# Patient Record
Sex: MALE | Race: BLACK OR AFRICAN AMERICAN | Employment: UNEMPLOYED | URBAN - METROPOLITAN AREA
[De-identification: names, ages, dates, MRNs, and addresses within clinical notes are randomized per-mention and may not be internally consistent; named-entity substitution may affect disease eponyms.]

---

## 2017-01-11 DIAGNOSIS — Z76.0 MEDICATION REFILL: ICD-10-CM

## 2017-01-11 RX ORDER — OXYCODONE AND ACETAMINOPHEN 7.5; 325 MG/1; MG/1
TABLET ORAL
Qty: 30 TAB | Refills: 0 | Status: CANCELLED | OUTPATIENT
Start: 2017-01-11

## 2017-01-11 NOTE — TELEPHONE ENCOUNTER
Patient request, has an appt 1/23/17    Requested Prescriptions     Pending Prescriptions Disp Refills    oxyCODONE-acetaminophen (PERCOCET) 7.5-325 mg per tablet 30 Tab 0     Sig: Take one po daily prn pain

## 2017-01-23 ENCOUNTER — OFFICE VISIT (OUTPATIENT)
Dept: INTERNAL MEDICINE CLINIC | Age: 57
End: 2017-01-23

## 2017-01-23 ENCOUNTER — HOSPITAL ENCOUNTER (OUTPATIENT)
Dept: LAB | Age: 57
Discharge: HOME | End: 2017-01-23
Payer: MEDICARE

## 2017-01-23 VITALS
BODY MASS INDEX: 36.1 KG/M2 | WEIGHT: 230 LBS | RESPIRATION RATE: 15 BRPM | OXYGEN SATURATION: 97 % | DIASTOLIC BLOOD PRESSURE: 88 MMHG | SYSTOLIC BLOOD PRESSURE: 150 MMHG | HEIGHT: 67 IN | TEMPERATURE: 97.5 F | HEART RATE: 61 BPM

## 2017-01-23 DIAGNOSIS — I10 BENIGN HYPERTENSION WITHOUT CHF: ICD-10-CM

## 2017-01-23 DIAGNOSIS — E11.9 DIABETES MELLITUS, STABLE (HCC): ICD-10-CM

## 2017-01-23 DIAGNOSIS — E11.9 DIABETES MELLITUS, STABLE (HCC): Primary | ICD-10-CM

## 2017-01-23 DIAGNOSIS — Z76.0 MEDICATION REFILL: ICD-10-CM

## 2017-01-23 DIAGNOSIS — E78.5 DYSLIPIDEMIA: ICD-10-CM

## 2017-01-23 DIAGNOSIS — Z12.5 SCREENING PSA (PROSTATE SPECIFIC ANTIGEN): ICD-10-CM

## 2017-01-23 DIAGNOSIS — Z23 ENCOUNTER FOR IMMUNIZATION: ICD-10-CM

## 2017-01-23 LAB
ALBUMIN SERPL BCP-MCNC: 4 G/DL (ref 3.4–5)
ALBUMIN/GLOB SERPL: 1.3 {RATIO} (ref 0.8–1.7)
ALP SERPL-CCNC: 95 U/L (ref 45–117)
ALT SERPL-CCNC: 47 U/L (ref 16–61)
ANION GAP BLD CALC-SCNC: 9 MMOL/L (ref 3–18)
APPEARANCE UR: CLEAR
AST SERPL W P-5'-P-CCNC: 26 U/L (ref 15–37)
BILIRUB SERPL-MCNC: 0.4 MG/DL (ref 0.2–1)
BILIRUB UR QL: NEGATIVE
BUN SERPL-MCNC: 18 MG/DL (ref 7–18)
BUN/CREAT SERPL: 16 (ref 12–20)
CALCIUM SERPL-MCNC: 9.2 MG/DL (ref 8.5–10.1)
CHLORIDE SERPL-SCNC: 100 MMOL/L (ref 100–108)
CHOLEST SERPL-MCNC: 132 MG/DL
CO2 SERPL-SCNC: 29 MMOL/L (ref 21–32)
COLOR UR: YELLOW
CREAT SERPL-MCNC: 1.14 MG/DL (ref 0.6–1.3)
CREAT UR-MCNC: 172.98 MG/DL (ref 30–125)
ERYTHROCYTE [DISTWIDTH] IN BLOOD BY AUTOMATED COUNT: 13.7 % (ref 11.6–14.5)
EST. AVERAGE GLUCOSE BLD GHB EST-MCNC: 151 MG/DL
GLOBULIN SER CALC-MCNC: 3 G/DL (ref 2–4)
GLUCOSE SERPL-MCNC: 107 MG/DL (ref 74–99)
GLUCOSE UR STRIP.AUTO-MCNC: NEGATIVE MG/DL
HBA1C MFR BLD: 6.9 % (ref 4.2–5.6)
HCT VFR BLD AUTO: 48.8 % (ref 36–48)
HDLC SERPL-MCNC: 45 MG/DL (ref 40–60)
HDLC SERPL: 2.9 {RATIO} (ref 0–5)
HGB BLD-MCNC: 15.8 G/DL (ref 13–16)
HGB UR QL STRIP: NEGATIVE
KETONES UR QL STRIP.AUTO: NEGATIVE MG/DL
LDLC SERPL CALC-MCNC: 75.8 MG/DL (ref 0–100)
LEUKOCYTE ESTERASE UR QL STRIP.AUTO: NEGATIVE
LIPID PROFILE,FLP: NORMAL
MCH RBC QN AUTO: 26.2 PG (ref 24–34)
MCHC RBC AUTO-ENTMCNC: 32.4 G/DL (ref 31–37)
MCV RBC AUTO: 81.1 FL (ref 74–97)
MICROALBUMIN UR-MCNC: 0.9 MG/DL (ref 0–3)
MICROALBUMIN/CREAT UR-RTO: 5 MG/G (ref 0–30)
NITRITE UR QL STRIP.AUTO: NEGATIVE
PH UR STRIP: 6 [PH] (ref 5–8)
PLATELET # BLD AUTO: 195 K/UL (ref 135–420)
PMV BLD AUTO: 10.9 FL (ref 9.2–11.8)
POTASSIUM SERPL-SCNC: 4.2 MMOL/L (ref 3.5–5.5)
PROT SERPL-MCNC: 7 G/DL (ref 6.4–8.2)
PROT UR STRIP-MCNC: NEGATIVE MG/DL
PSA SERPL-MCNC: 0.8 NG/ML (ref 0–4)
RBC # BLD AUTO: 6.02 M/UL (ref 4.7–5.5)
SODIUM SERPL-SCNC: 138 MMOL/L (ref 136–145)
SP GR UR REFRACTOMETRY: 1.02 (ref 1–1.03)
TRIGL SERPL-MCNC: 56 MG/DL (ref ?–150)
TSH SERPL DL<=0.05 MIU/L-ACNC: 0.82 UIU/ML (ref 0.36–3.74)
UROBILINOGEN UR QL STRIP.AUTO: 0.2 EU/DL (ref 0.2–1)
VLDLC SERPL CALC-MCNC: 11.2 MG/DL
WBC # BLD AUTO: 5 K/UL (ref 4.6–13.2)

## 2017-01-23 PROCEDURE — 36415 COLL VENOUS BLD VENIPUNCTURE: CPT | Performed by: INTERNAL MEDICINE

## 2017-01-23 PROCEDURE — 84153 ASSAY OF PSA TOTAL: CPT | Performed by: INTERNAL MEDICINE

## 2017-01-23 PROCEDURE — 82043 UR ALBUMIN QUANTITATIVE: CPT | Performed by: INTERNAL MEDICINE

## 2017-01-23 PROCEDURE — 80061 LIPID PANEL: CPT | Performed by: INTERNAL MEDICINE

## 2017-01-23 PROCEDURE — 81003 URINALYSIS AUTO W/O SCOPE: CPT | Performed by: INTERNAL MEDICINE

## 2017-01-23 PROCEDURE — 85027 COMPLETE CBC AUTOMATED: CPT | Performed by: INTERNAL MEDICINE

## 2017-01-23 PROCEDURE — 80053 COMPREHEN METABOLIC PANEL: CPT | Performed by: INTERNAL MEDICINE

## 2017-01-23 PROCEDURE — 83036 HEMOGLOBIN GLYCOSYLATED A1C: CPT | Performed by: INTERNAL MEDICINE

## 2017-01-23 PROCEDURE — 84443 ASSAY THYROID STIM HORMONE: CPT | Performed by: INTERNAL MEDICINE

## 2017-01-23 RX ORDER — NAPROXEN 500 MG/1
500 TABLET ORAL
COMMUNITY
End: 2017-04-24

## 2017-01-23 RX ORDER — OXYCODONE AND ACETAMINOPHEN 7.5; 325 MG/1; MG/1
TABLET ORAL
Qty: 30 TAB | Refills: 0 | Status: SHIPPED | OUTPATIENT
Start: 2017-01-23 | End: 2017-03-08 | Stop reason: SDUPTHER

## 2017-01-23 RX ORDER — CYCLOBENZAPRINE HCL 10 MG
10 TABLET ORAL
COMMUNITY
End: 2017-04-24

## 2017-01-23 NOTE — MR AVS SNAPSHOT
Visit Information Date & Time Provider Department Dept. Phone Encounter #  
 1/23/2017  9:45 AM Matthew Odell MD [address] [phone] [id number] Follow-up Instructions Return in about 3 months (around 4/23/2017) for medicare wellness subsequent exam, f/u DM/HTN. Upcoming Health Maintenance Date Due Pneumococcal 19-64 Highest Risk (1 of 3 - PCV13) 12/17/1978 DTaP/Tdap/Td series (1 - Tdap) 12/17/1980 FOOT EXAM Q1 11/23/2016 HEMOGLOBIN A1C Q6M 1/12/2017 MICROALBUMIN Q1 3/23/2017 LIPID PANEL Q1 7/12/2017 EYE EXAM RETINAL OR DILATED Q1 9/9/2017 COLONOSCOPY 12/2/2018 Allergies as of 1/23/2017  Review Complete On: 1/23/2017 By: Rex Coley MD  
 No Known Allergies Current Immunizations  Reviewed on 3/23/2016 Name Date Influenza Vaccine 9/9/2014  9:28 AM, 2/6/2014  1:10 AM  
 Influenza Vaccine (Quad) PF  Incomplete, 11/23/2015 Not reviewed this visit You Were Diagnosed With   
  
 Codes Comments Diabetes mellitus, stable [facility]    -  Primary ICD-10-CM: E11.9 ICD-9-CM: 250.00 Benign hypertension without CHF     ICD-10-CM: I10 
ICD-9-CM: 401.1 Dyslipidemia     ICD-10-CM: E78.5 ICD-9-CM: 272.4 Encounter for immunization     ICD-10-CM: L33 ICD-9-CM: V03.89 Screening PSA (prostate specific antigen)     ICD-10-CM: Z12.5 ICD-9-CM: V76.44 Medication refill     ICD-10-CM: Z76.0 ICD-9-CM: V68.1 Vitals BP Pulse Temp Resp Height(growth percentile) Weight(growth percentile) (!) 149/96 61 97.5 °F (36.4 °C) (Oral) 15 5' 7\" (1.702 m) 230 lb (104.3 kg) SpO2 BMI Smoking Status 97% 36.02 kg/m2 Never Smoker BMI and BSA Data Body Mass Index Body Surface Area 36.02 kg/m 2 2.22 m 2 Preferred Pharmacy Pharmacy Name Phone [facility] [address] [facility], [address] [place] [address] [phone] Your Updated Medication List  
  
   
This list is accurate as of: 1/23/17 10:17 AM.  Always use your most recent med list. amLODIPine-benazepril 5-20 mg per capsule Commonly known as:  LOTREL  
TAKE ONE CAPSULE BY MOUTH ONCE DAILY  
  
 aspirin, buffered 81 mg Tab Take 81 mg by mouth daily. atorvastatin 10 mg tablet Commonly known as:  LIPITOR  
TAKE 1 TABLET BY MOUTH DAILY  
  
 cyclobenzaprine 10 mg tablet Commonly known as:  FLEXERIL  
10 mg. FREESTYLE LITE METER monitoring kit Generic drug:  Blood-Glucose Meter Check fasting sugars daily before breakfast. BRAND MEDICALLY NECESSARY FREESTYLE LITE STRIPS strip Generic drug:  glucose blood VI test strips Check fasting sugars daily before breakfast. BRAND MEDICALLY NECESSARY. FOR FREESTYLE LITE METER  
  
 furosemide 20 mg tablet Commonly known as:  LASIX Take one po daily prn swelling  
  
 gabapentin 300 mg capsule Commonly known as:  NEURONTIN  
TAKE 2 CAPSULES BY MOUTH EVERY MORNING AND 2 CAPSULES EVERY EVENING Lancets Misc Check fasting sugars daily before breakfast. BRAND MEDICALLY NECESSARY. FOR FREESTYLE LITE METER  
  
 metoprolol tartrate 50 mg tablet Commonly known as:  LOPRESSOR  
TAKE ONE TABLET BY MOUTH TWICE DAILY  
  
 naproxen 500 mg tablet Commonly known as:  NAPROSYN  
500 mg.  
  
 oxyCODONE-acetaminophen 7.5-325 mg per tablet Commonly known as:  PERCOCET Take one po daily prn pain Prescriptions Printed Refills  
 oxyCODONE-acetaminophen (PERCOCET) 7.5-325 mg per tablet 0 Sig: Take one po daily prn pain  
 Class: Print We Performed the Following ADMIN INFLUENZA VIRUS VAC [ [facility]] INFLUENZA VIRUS VAC QUAD,SPLIT,PRESV FREE SYRINGE 3/> YRS IM [id number] CPT(R)] Follow-up Instructions Return in about 3 months (around 4/23/2017) for medicare wellness subsequent exam, f/u DM/HTN. To-Do List   
 01/23/2017 Lab:  CBC W/O DIFF   
  
 01/23/2017 Lab:  HEMOGLOBIN A1C WITH EAG   
  
 01/23/2017 Lab:  LIPID PANEL   
  
 01/23/2017 Lab:  METABOLIC PANEL, COMPREHENSIVE   
  
 01/23/2017 Lab:  MICROALBUMIN, UR, RAND W/ MICROALBUMIN/CREA RATIO   
  
 01/23/2017 Lab:  PSA SCREENING (SCREENING)   
  
 01/23/2017 Lab:  TSH 3RD GENERATION   
  
 01/23/2017 Lab:  URINALYSIS W/ RFLX MICROSCOPIC Patient Instructions 1) follow-up in 3months or sooner if worsening symptoms. Introducing hospitals & HEALTH SERVICES! Bernadette Mendosa introduces [employer] patient portal. Now you can access parts of your medical record, email your doctor's office, and request medication refills online. 1. In your internet browser, go to [email or web address] 2. Click on the First Time User? Click Here link in the Sign In box. You will see the New Member Sign Up page. 3. Enter your [employer] Access Code exactly as it appears below. You will not need to use this code after youve completed the sign-up process. If you do not sign up before the expiration date, you must request a new code. · [employer] Access Code: [email or web address] Expires: 2/1/2017  2:06 PM 
 
4. Enter the last four digits of your Social Security Number (xxxx) and Date of Birth (mm/dd/yyyy) as indicated and click Submit. You will be taken to the next sign-up page. 5. Create a [employer] ID. This will be your [employer] login ID and cannot be changed, so think of one that is secure and easy to remember. 6. Create a [employer] password. You can change your password at any time. 7. Enter your Password Reset Question and Answer. This can be used at a later time if you forget your password. 8. Enter your e-mail address. You will receive e-mail notification when new information is available in [address] 9. Click Sign Up. You can now view and download portions of your medical record. 10. Click the Download Summary menu link to download a portable copy of your medical information. If you have questions, please visit the Frequently Asked Questions section of the [email or web address] website. Remember, [email or web address] is NOT to be used for urgent needs. For medical emergencies, dial 911. Now available from your iPhone and Android! Please provide this summary of care documentation to your next provider. Your primary care clinician is listed as Juan Rodríguez. If you have any questions after today's visit, please call [phone].

## 2017-01-23 NOTE — PROGRESS NOTES
Chief Complaint   Patient presents with    Diabetes    Hypertension    Back Pain       HPI:     Dacia Hernandez is a 61 y.o.  male with history of type 2 DM and hypertension  here for the above complaint. He had to go to orthopedics for his right knee pain. He denies any chest pain, shortness of breath, abdominal pain, headaches or dizziness. Type 2 DM: 106 this AM. Sugar range: 100-110.             Past Medical History   Diagnosis Date    Anxiety     Back injury 4/27/2007    Behavioral change     Chronic pain     CKD (chronic kidney disease) stage 2, GFR 60-89 ml/min 12/20/2013    Confusion     DDD (degenerative disc disease)     Depression     Diabetic eye exam ([facility] 09/09/2016     Dr. Saige Murguia ( bilateral cataracts)    Difficulty walking     DJD (degenerative joint disease)     Fatigue     Generalized stiffness     H/O colonoscopy 12/2/2013     Bx: showed 2 tubular adenomas Done by Dr. Aneta Almendarez    High cholesterol     Hypertension     Incoordination     Infected sebaceous cyst 5/4/2016    Insomnia     Joint pain     Lower extremity edema     Lymphedema     Muscle pain     Neuropathy     Osteoarthritis     Other and unspecified hyperlipidemia     Painful sexual intercourse     Poor concentration     Snoring     Type II or unspecified type diabetes mellitus without mention of complication, not stated as uncontrolled     Weakness      Past Surgical History   Procedure Laterality Date    Hx colonoscopy      Hx orthopaedic       left elbow surgery    Hx orthopaedic Left 1/19/16     left Total Knee replacement     Current Outpatient Prescriptions   Medication Sig    oxyCODONE-acetaminophen (PERCOCET) 7.5-325 mg per tablet Take one po daily prn pain    atorvastatin (LIPITOR) 10 mg tablet TAKE 1 TABLET BY MOUTH DAILY    gabapentin (NEURONTIN) 300 mg capsule TAKE 2 CAPSULES BY MOUTH EVERY MORNING AND 2 CAPSULES EVERY EVENING    metoprolol tartrate (LOPRESSOR) 50 mg tablet TAKE ONE TABLET BY MOUTH TWICE DAILY    FREESTYLE LITE METER monitoring kit Check fasting sugars daily before breakfast. BRAND MEDICALLY NECESSARY    Lancets misc Check fasting sugars daily before breakfast. BRAND MEDICALLY NECESSARY. FOR FREESTYLE LITE METER    FREESTYLE LITE STRIPS strip Check fasting sugars daily before breakfast. BRAND MEDICALLY NECESSARY. FOR FREESTYLE LITE METER    Aspirin, Buffered 81 mg tab Take 81 mg by mouth daily.  amLODIPine-benazepril (LOTREL) 5-20 mg per capsule TAKE ONE CAPSULE BY MOUTH ONCE DAILY    furosemide (LASIX) 20 mg tablet Take one po daily prn swelling    cyclobenzaprine (FLEXERIL) 10 mg tablet 10 mg.    naproxen (NAPROSYN) 500 mg tablet 500 mg. No current facility-administered medications for this visit. Health Maintenance   Topic Date Due    Pneumococcal 19-64 Highest Risk (1 of 3 - PCV13) 12/17/1978    DTaP/Tdap/Td series (1 - Tdap) 12/17/1980    FOOT EXAM Q1  11/23/2016    HEMOGLOBIN A1C Q6M  01/12/2017    MICROALBUMIN Q1  03/23/2017    LIPID PANEL Q1  07/12/2017    EYE EXAM RETINAL OR DILATED Q1  09/09/2017    COLONOSCOPY  12/02/2018    Hepatitis C Screening  Completed    INFLUENZA AGE 9 TO ADULT  Completed     Immunization History   Administered Date(s) Administered    Influenza Vaccine 02/06/2014, 09/09/2014    Influenza Vaccine (Quad) PF 11/23/2015, 01/23/2017     No LMP for male patient. Allergies and Intolerances:   No Known Allergies    Family History:   Family History   Problem Relation Age of Onset   [place] Stroke Mother     Hypertension Mother     Seizures Mother     Diabetes Father     Heart Disease Father     Hypertension Father     Other Maternal Grandmother      tumor in abdomen       Social History:   He  reports that he has never smoked. He has never used smokeless tobacco.  He  reports that he does not drink alcohol.             Objective:   Physical exam:   Visit Vitals    /88 (BP 1 Location: Left arm, BP Patient Position: Sitting)  Comment: pt did not take his BP meds this AM    Pulse 61    Temp 97.5 °F (36.4 °C) (Oral)    Resp 15    Ht 5' 7\" (1.702 m)    Wt 230 lb (104.3 kg)    SpO2 97%    BMI 36.02 kg/m2        Generally: Pleasant male in no acute distress  Cardiac Exam: regular, rate, and rhythm. Normal S1 and S2. No murmurs, gallops, or rubs  Pulmonary exam: Clear to auscultation bilaterally  Abdominal exam: Positive bowel sounds in all four quadrants, soft, nondistended, nontender  Extremities: 2+ dorsalis pedis pulses bilaterally. No pedal edema    bilaterally    LABS/Radiological Tests:  Component      Latest Ref Rng & Units 7/12/2016 7/12/2016 7/12/2016           9:46 AM  9:46 AM  9:46 AM   Sodium      136 - 145 mmol/L   140   Potassium      3.5 - 5.5 mmol/L   3.8   Chloride      100 - 108 mmol/L   105   CO2      21 - 32 mmol/L   29   Anion gap      3.0 - 18 mmol/L   6   Glucose      74 - 99 mg/dL   114 (H)   BUN      7.0 - 18 MG/DL   21 (H)   Creatinine      0.6 - 1.3 MG/DL   1.24   BUN/Creatinine ratio      12 - 20     17   GFR est AA      >60 ml/min/1.73m2   >60   GFR est non-AA      >60 ml/min/1.73m2   >60   Calcium      8.5 - 10.1 MG/DL   8.7   Bilirubin, total      0.2 - 1.0 MG/DL   0.6   ALT      16 - 61 U/L   51   AST      15 - 37 U/L   26   Alk. phosphatase      45 - 117 U/L   106   Protein, total      6.4 - 8.2 g/dL   6.9   Albumin      3.4 - 5.0 g/dL   3.8   Globulin      2.0 - 4.0 g/dL   3.1   A-G Ratio      0.8 - 1.7     1.2   Cholesterol, total      <200 MG/DL  131    Triglyceride      <150 MG/DL  58    HDL Cholesterol      40 - 60 MG/DL  43    LDL, calculated      0 - 100 MG/DL  76.4    VLDL, calculated      MG/DL  11.6    CHOL/HDL Ratio      0 - 5.0    3.0    Hemoglobin A1c, (calculated)      4.2 - 5.6 % 6.8 (H)     Est. average glucose      mg/dL 148       Previous labs      ASSESSMENT/PLAN:    1. Diabetes mellitus, [facility] don't know how controlled it is. We will see what the labs show. Continue diet and exercise.   -     HEMOGLOBIN A1C WITH EAG; Future  -     MICROALBUMIN, UR, RAND W/ MICROALBUMIN/CREA RATIO; Future  -     TSH 3RD GENERATION; Future    2. Benign hypertension without CHF: not well controlled because he did not take his BP meds this AM. He will take his BP meds ASAP, the lotrel, lasix, and lopressor and diet and exercise. -     CBC W/O DIFF; Future  -     URINALYSIS W/ RFLX MICROSCOPIC; Future    3. Dyslipidemia: we will see what the labs show. Continue diet, exercise, and lipitor.   -     METABOLIC PANEL, COMPREHENSIVE; Future  -     LIPID PANEL; Future    4. Encounter for immunization  -     Influenza virus vaccine (QUADRIVALENT PRES FREE) IM 3 years and older  -     Administration fee () for Medicare insured patients    5. Screening PSA (prostate specific antigen)  -     PSA SCREENING (SCREENING); Future    6. Medication refill  -     oxyCODONE-acetaminophen (PERCOCET) 7.5-325 mg per tablet; Take one po daily prn pain    7. Requested Prescriptions     Signed Prescriptions Disp Refills    oxyCODONE-acetaminophen (PERCOCET) 7.5-325 mg per tablet 30 Tab 0     Sig: Take one po daily prn pain       8. Patient verbalized understanding and agreement with the plan. 9. Patient was given an after-visit summary. 10. Follow-up Disposition:  Return in about 3 months (around 4/23/2017) for medicare wellness subsequent exam, f/u DM/HTN. or sooner if worsening symptoms. Hai Jon MD      Addendum: Diabetic foot exam:     Left: Filament test normal sensation with micro filament,. All 5 toes and bottom of foot   Pulse DP: 2+ (normal)   Deformities: None, except callous on bottom of left heel medial aspect  Right: Filament test normal sensation with micro filament, all 5 toes and bottom of foot.     Pulse DP: 2+ (normal)   Deformities: none

## 2017-01-23 NOTE — PROGRESS NOTES
ROOM # 2    Kim Pryor presents today for   Chief Complaint   Patient presents with    Diabetes    Hypertension    Back Pain       Kim Pryor preferred language for health care discussion is english/other. Is someone accompanying this pt? no    Is the patient using any DME equipment during OV? no    Depression Screening:  PHQ 2 / 9, over the last two weeks 9/22/2016 3/23/2016 3/23/2016 4/15/2015 3/20/2014   Little interest or pleasure in doing things Not at all Not at all Not at all Several days Several days   Feeling down, depressed or hopeless Not at all Not at all Not at all Not at all Several days   Total Score PHQ 2 0 0 0 1 2       Learning Assessment:  Learning Assessment 1/14/2015 12/20/2013   PRIMARY LEARNER Patient Patient   HIGHEST LEVEL OF EDUCATION - PRIMARY LEARNER  DID NOT GRADUATE HIGH SCHOOL DID NOT GRADUATE [address] LEARNER NONE NONE   PRIMARY LANGUAGE ENGLISH ENGLISH    NEED - No   LEARNER PREFERENCE PRIMARY DEMONSTRATION DEMONSTRATION   LEARNING SPECIAL TOPICS - no   ANSWERED BY patient patient   RELATIONSHIP SELF SELF       Abuse Screening:  No flowsheet data found. Fall Risk  No flowsheet data found. Health Maintenance reviewed and discussed per provider. Yes    Kim Pryor is due for A1C, Foot exam, flu vaccine. Please order/place referral if appropriate. Advance Directive:  1. Do you have an advance directive in place? Patient Reply: no    2. If not, would you like material regarding how to put one in place? Patient Reply: no    Coordination of Care:  1. Have you been to the ER, urgent care clinic since your last visit? Hospitalized since your last visit? no    2. Have you seen or consulted any other health care providers outside of the [address] [place] [address] since your last visit? Include any pap smears or colon screening. no      Immunization/s administered 1/23/2017 by Lauren Talbot LPN with guardian's consent. Patient tolerated procedure well. No reactions noted.

## 2017-03-08 DIAGNOSIS — Z76.0 MEDICATION REFILL: ICD-10-CM

## 2017-03-08 RX ORDER — OXYCODONE AND ACETAMINOPHEN 7.5; 325 MG/1; MG/1
TABLET ORAL
Qty: 30 TAB | Refills: 0 | Status: SHIPPED | OUTPATIENT
Start: 2017-03-08 | End: 2017-04-10 | Stop reason: SDUPTHER

## 2017-03-08 NOTE — TELEPHONE ENCOUNTER
Printed rx for:    Requested Prescriptions     Signed Prescriptions Disp Refills    oxyCODONE-acetaminophen (PERCOCET) 7.5-325 mg per tablet 30 Tab 0     Sig: Take one po daily prn pain     Authorizing Provider: Antonio Sanchez     Please let pt know that this is ready for .

## 2017-03-08 NOTE — TELEPHONE ENCOUNTER
Patient request, last OV 1/23/17, next scheduled 4/24/17    Requested Prescriptions     Pending Prescriptions Disp Refills    oxyCODONE-acetaminophen (PERCOCET) 7.5-325 mg per tablet 30 Tab 0     Sig: Take one po daily prn pain

## 2017-04-24 ENCOUNTER — OFFICE VISIT (OUTPATIENT)
Dept: INTERNAL MEDICINE CLINIC | Age: 57
End: 2017-04-24

## 2017-04-24 ENCOUNTER — HOSPITAL ENCOUNTER (OUTPATIENT)
Dept: LAB | Age: 57
Discharge: HOME | End: 2017-04-24
Payer: MEDICARE

## 2017-04-24 VITALS
HEIGHT: 67 IN | WEIGHT: 231.4 LBS | DIASTOLIC BLOOD PRESSURE: 80 MMHG | OXYGEN SATURATION: 98 % | BODY MASS INDEX: 36.32 KG/M2 | HEART RATE: 51 BPM | TEMPERATURE: 96.9 F | SYSTOLIC BLOOD PRESSURE: 132 MMHG | RESPIRATION RATE: 16 BRPM

## 2017-04-24 DIAGNOSIS — E11.9 DIABETES MELLITUS, STABLE (HCC): ICD-10-CM

## 2017-04-24 DIAGNOSIS — I10 BENIGN HYPERTENSION WITHOUT CHF: ICD-10-CM

## 2017-04-24 DIAGNOSIS — G89.29 ENCOUNTER FOR CHRONIC PAIN MANAGEMENT: ICD-10-CM

## 2017-04-24 DIAGNOSIS — Z00.00 ENCOUNTER FOR MEDICARE ANNUAL WELLNESS EXAM: Primary | ICD-10-CM

## 2017-04-24 DIAGNOSIS — E78.5 DYSLIPIDEMIA: ICD-10-CM

## 2017-04-24 DIAGNOSIS — Z76.0 MEDICATION REFILL: ICD-10-CM

## 2017-04-24 PROBLEM — Z71.89 ADVANCE DIRECTIVE DISCUSSED WITH PATIENT: Status: ACTIVE | Noted: 2017-04-24

## 2017-04-24 LAB
ALBUMIN SERPL BCP-MCNC: 3.8 G/DL (ref 3.4–5)
ALBUMIN/GLOB SERPL: 1.2 {RATIO} (ref 0.8–1.7)
ALCOHOL UR POC: NORMAL
ALP SERPL-CCNC: 106 U/L (ref 45–117)
ALT SERPL-CCNC: 61 U/L (ref 16–61)
AMPHETAMINES UR POC: NEGATIVE
ANION GAP BLD CALC-SCNC: 3 MMOL/L (ref 3–18)
AST SERPL W P-5'-P-CCNC: 27 U/L (ref 15–37)
BARBITURATES UR POC: NEGATIVE
BENZODIAZEPINES UR POC: NEGATIVE
BILIRUB SERPL-MCNC: 0.3 MG/DL (ref 0.2–1)
BUN SERPL-MCNC: 19 MG/DL (ref 7–18)
BUN/CREAT SERPL: 16 (ref 12–20)
BUPRENORPHINE UR POC: NORMAL
CALCIUM SERPL-MCNC: 9.1 MG/DL (ref 8.5–10.1)
CANNABINOIDS UR POC: NEGATIVE
CARISOPRODOL UR POC: NORMAL
CHLORIDE SERPL-SCNC: 105 MMOL/L (ref 100–108)
CHOLEST SERPL-MCNC: 148 MG/DL
CO2 SERPL-SCNC: 32 MMOL/L (ref 21–32)
COCAINE UR POC: NEGATIVE
CREAT SERPL-MCNC: 1.22 MG/DL (ref 0.6–1.3)
EST. AVERAGE GLUCOSE BLD GHB EST-MCNC: 154 MG/DL
FENTANYL UR POC: NORMAL
GLOBULIN SER CALC-MCNC: 3.3 G/DL (ref 2–4)
GLUCOSE SERPL-MCNC: 116 MG/DL (ref 74–99)
HBA1C MFR BLD: 7 % (ref 4.2–5.6)
HDLC SERPL-MCNC: 45 MG/DL (ref 40–60)
HDLC SERPL: 3.3 {RATIO} (ref 0–5)
LDLC SERPL CALC-MCNC: 84.8 MG/DL (ref 0–100)
LIPID PROFILE,FLP: NORMAL
MDMA/ECSTASY UR POC: NEGATIVE
METHADONE UR POC: NEGATIVE
METHAMPHETAMINE UR POC: NEGATIVE
METHYLPHENIDATE UR POC: NORMAL
OPIATES UR POC: NEGATIVE
OXYCODONE UR POC: NEGATIVE
PHENCYCLIDINE UR POC: NORMAL
POTASSIUM SERPL-SCNC: 4.4 MMOL/L (ref 3.5–5.5)
PROPOXYPHENE UR POC: NORMAL
PROT SERPL-MCNC: 7.1 G/DL (ref 6.4–8.2)
SODIUM SERPL-SCNC: 140 MMOL/L (ref 136–145)
TRAMADOL UR POC: NORMAL
TRICYCLICS UR POC: NORMAL
TRIGL SERPL-MCNC: 91 MG/DL (ref ?–150)
VLDLC SERPL CALC-MCNC: 18.2 MG/DL

## 2017-04-24 PROCEDURE — 83036 HEMOGLOBIN GLYCOSYLATED A1C: CPT | Performed by: INTERNAL MEDICINE

## 2017-04-24 PROCEDURE — 80053 COMPREHEN METABOLIC PANEL: CPT | Performed by: INTERNAL MEDICINE

## 2017-04-24 PROCEDURE — 80061 LIPID PANEL: CPT | Performed by: INTERNAL MEDICINE

## 2017-04-24 RX ORDER — FUROSEMIDE 20 MG/1
TABLET ORAL
Qty: 30 TAB | Refills: 1 | Status: SHIPPED | OUTPATIENT
Start: 2017-04-24

## 2017-04-24 RX ORDER — FUROSEMIDE 20 MG/1
TABLET ORAL
Qty: 30 TAB | Refills: 1 | Status: CANCELLED | OUTPATIENT
Start: 2017-04-24

## 2017-04-24 RX ORDER — LANCETS
EACH MISCELLANEOUS
Qty: 100 EACH | Refills: 11 | Status: SHIPPED | OUTPATIENT
Start: 2017-04-24 | End: 2019-01-21

## 2017-04-24 RX ORDER — BLOOD-GLUCOSE METER
KIT MISCELLANEOUS
Qty: 100 STRIP | Refills: 11 | Status: SHIPPED | OUTPATIENT
Start: 2017-04-24 | End: 2019-01-21

## 2017-04-24 NOTE — PROGRESS NOTES
Chief Complaint   Patient presents with    Hypertension    Diabetes    Annual Wellness Visit    Medication Refill         HPI:     Heidy Lynch is a 61 y.o.  male with history of type 2 DM and hypertension here for the above complaint. Right knee swelling from lymphedema and needs refill of lasix. Type 2 DM: Sugar range: he has not been checking sugars because ran out of strips. He has been out of the strips for 3 weeks.                Past Medical History:   Diagnosis Date    Advance directive discussed with patient 04/24/2017    Anxiety     Back injury 4/27/2007    Behavioral change     Chronic pain     CKD (chronic kidney disease) stage 2, GFR 60-89 ml/min 12/20/2013    Confusion     DDD (degenerative disc disease)     Depression     Diabetic eye exam ([facility] [address] 09/09/2016    Dr. Pito Lutz ( bilateral cataracts)    Difficulty walking     DJD (degenerative joint disease)     Fatigue     Generalized stiffness     H/O colonoscopy 12/2/2013    Bx: showed 2 tubular adenomas Done by Dr. Nikunj Davies    High cholesterol     Hypertension     Incoordination     Infected sebaceous cyst 5/4/2016    Insomnia     Joint pain     Lower extremity edema     Lymphedema     Muscle pain     Neuropathy     Osteoarthritis     Other and unspecified hyperlipidemia     Painful sexual intercourse     Poor concentration     Snoring     Type II or unspecified type diabetes mellitus without mention of complication, not stated as uncontrolled     Weakness        Past Surgical History:   Procedure Laterality Date    HX COLONOSCOPY      HX ORTHOPAEDIC      left elbow surgery    HX ORTHOPAEDIC Left 1/19/16    left Total Knee replacement           MEDICATION ALLERGIES/INTOLERANCES:   No Known Allergies          CURRENT MEDICATIONS:    Current Outpatient Prescriptions   Medication Sig    FREESTYLE LITE STRIPS strip Check fasting sugars daily before breakfast. BRAND MEDICALLY NECESSARY. FOR FREESTYLE LITE METER    Lancets misc Check fasting sugars daily before breakfast. BRAND MEDICALLY NECESSARY. FOR FREESTYLE LITE METER    furosemide (LASIX) 20 mg tablet Take one po daily prn swelling    oxyCODONE-acetaminophen (PERCOCET) 7.5-325 mg per tablet Take one po daily prn pain    atorvastatin (LIPITOR) 10 mg tablet TAKE 1 TABLET BY MOUTH DAILY    amLODIPine-benazepril (LOTREL) 5-20 mg per capsule TAKE ONE CAPSULE BY MOUTH ONCE DAILY    metoprolol tartrate (LOPRESSOR) 50 mg tablet TAKE ONE TABLET BY MOUTH TWICE DAILY    gabapentin (NEURONTIN) 300 mg capsule TAKE 2 CAPSULES BY MOUTH EVERY MORNING AND 2 CAPSULES EVERY EVENING    FREESTYLE LITE METER monitoring kit Check fasting sugars daily before breakfast. BRAND MEDICALLY NECESSARY    Aspirin, Buffered 81 mg tab Take 81 mg by mouth daily. No current facility-administered medications for this visit. Health Maintenance   Topic Date Due    Pneumococcal 19-64 Medium Risk (1 of 1 - PPSV23) 09/22/2017 (Originally 12/17/1979)    HEMOGLOBIN A1C Q6M  07/23/2017    EYE EXAM RETINAL OR DILATED Q1  09/09/2017    MICROALBUMIN Q1  01/23/2018    LIPID PANEL Q1  01/23/2018    FOOT EXAM Q1  01/25/2018    COLONOSCOPY  12/02/2018    DTaP/Tdap/Td series (2 - Td) 04/24/2027    Hepatitis C Screening  Completed    INFLUENZA AGE 9 TO ADULT  Completed         FAMILY HISTORY:   Family History   Problem Relation Age of Onset    Stroke Mother     Hypertension Mother     Seizures Mother     Diabetes Father     Heart Disease Father     Hypertension Father     Other Maternal Grandmother      tumor in abdomen       SOCIAL HISTORY:   He  reports that he has never smoked. He has never used smokeless tobacco.  He  reports that he does not drink alcohol. HEALTH MAINTENANCE AND SCREENING:  Immunizations reviewed,  indicated. Tdap: per pt in last 10 yrs  Pneumovax: pt got.  He was told to get information from the pharmacy that got the rx,    ROS:   General: negative for - chills, fatigue, fever, weight loss or night sweats, positive for weight gain  HEENT: negative for - no sore throat, nasal congestion, vision problems or ear problems  Resp: negative for - cough, shortness of breath or wheezing  CV: negative for - chest pain, palpitations, orthopnea or PND,  GI: negative for - abdominal pain, change in bowel habits, constipation, diarrhea, blood or black tarry stools, or nausea/vomiting  : negative for - dysuria, hematuria, increase urgency and frequency, nocturia  Heme: negative for -excessive bleeding or bruising  Endo: negative for - polydipsia/polyuria or hot or cold intolerance  MSK: negative for - or muscle pain, positive for joint pain, joint swelling , positive for edema  Neuro: negative for - numbness, tingling, headache or dizziness  Derm: negative for - dry skin, hair changes, rash or skin lesion changes  Psych: negative for - anxiety, depression, irritability or mood swings, insomnia    OBJECTIVE:  PHYSICAL EXAM: Vitals:   Vitals:    04/24/17 0938 04/24/17 1005   BP: (!) 146/93 132/80   Pulse: (!) 51    Resp: 16    Temp: 96.9 °F (36.1 °C)    TempSrc: Oral    SpO2: 98%    Weight: 231 lb 6.4 oz (105 kg)    Height: 5' 7\" (1.702 m)      Exam:   Generally: Pleasant male in no acute distress    Cardiac exam: regular, rate, and rhythm. No murmurs, gallops, or rubs. Normal S1 and S2.    Pulmonary exam: Clear to ausculation bilaterally    Abdominal exam: Positive bowel sounds in all four quadrants. Soft. Nondistended. Nontender. No hepatosplenomegaly. Extremities: 2+ dorsalis pedis bilaterally. No pedal edema bilaterally.            LABS/RADIOLOGICAL TESTS:   Lab Results   Component Value Date/Time    WBC 5.0 01/23/2017 10:37 AM    HGB 15.8 01/23/2017 10:37 AM    HCT 48.8 01/23/2017 10:37 AM    PLATELET 877 85/80/2421 10:37 AM     Lab Results   Component Value Date/Time    Sodium 138 01/23/2017 10:37 AM    Potassium 4.2 01/23/2017 10:37 AM    Chloride 100 01/23/2017 10:37 AM    CO2 29 01/23/2017 10:37 AM    Glucose 107 01/23/2017 10:37 AM    BUN 18 01/23/2017 10:37 AM    Creatinine 1.14 01/23/2017 10:37 AM     Lab Results   Component Value Date/Time    Cholesterol, total 132 01/23/2017 10:37 AM    HDL Cholesterol 45 01/23/2017 10:37 AM    LDL, calculated 75.8 01/23/2017 10:37 AM    Triglyceride 56 01/23/2017 10:37 AM     No results found for: GPT   Component      Latest Ref Rng & Units 1/23/2017          10:37 AM   Hemoglobin A1c, (calculated)      4.2 - 5.6 % 6.9 (H)     Previous labs    Component      Latest Ref Rng & Units 4/24/2017          10:18 AM   ALCOHOL UR POC          AMPHETAMINES UR POC       Negative   CARISOPRODOL UR POC          COCAINE UR POC       Negative   FENTANYL UR POC          MDMA/ECSTASY UR POC       Negative   METHADONE UR POC       Negative   METHAMPHETAMINE UR POC       Negative   METHYLPHENIDATE UR POC          OPIATES UR POC       Negative   OXYCODONE UR POC       Negative   PHENCYCLIDINE UR POC          PROPOXYPHENE UR POC          TRAMADOL UR POC          TRICYCLICS UR POC          BARBITURATES UR POC       Negative   BENZODIAZEPINES UR POC       Negative   CANNABINOIDS UR POC       Negative   BUPRENORPHINE UR POC          Pt notified of results at [address] today, 4/24/17        ASSESSMENT/PLAN:      ICD-10-CM ICD-9-CM    1. Encounter for Medicare annual wellness exam Z00.00 V70.0    2. Diabetes mellitus, stable ([address] E11.9 250.00 We will see what the labs show. Continue diet and exercise. HEMOGLOBIN A1C WITH EAG      FREESTYLE LITE STRIPS strip      Lancets misc   3. Benign hypertension without CHF I10 401.1 Stable. Continue the lotrel. Lopressor, diet and exercise. 4. Dyslipidemia E78.5 272.4 We will see what the labs show. Continue the lipitor, diet and exercise. METABOLIC PANEL, COMPREHENSIVE      LIPID PANEL   5. Medication refill Z76.0 V68.1 furosemide (LASIX) 20 mg tablet.  He will increase high potassium foods while on the lasix. 6. Encounter for chronic pain management G89.29 V65.49 AMB POC DRUG SCREEN ()VXS negative. Last taken percocet yesterday. 7.     Requested Prescriptions     Signed Prescriptions Disp Refills    FREESTYLE LITE STRIPS strip 100 Strip 11     Sig: Check fasting sugars daily before breakfast. BRAND MEDICALLY NECESSARY. FOR FREESTYLE LITE METER    Lancets misc 100 Each 11     Sig: Check fasting sugars daily before breakfast. BRAND MEDICALLY NECESSARY. FOR FREESTYLE LITE METER    furosemide (LASIX) 20 mg tablet 30 Tab 1     Sig: Take one po daily prn swelling       8. Patient verbalized understanding and agreement with the plan. 9. Patient was given an after visit summary. 10.   Follow-up Disposition:  Return in about 3 months (around 7/24/2017) for f/u DM/HTN. or sooner if worsening symptoms.       Joan Ren MD

## 2017-04-24 NOTE — PATIENT INSTRUCTIONS
1) increase high potassium foods while on lasix    2) follow-up in 3 months or sooner if worsening symptoms. Schedule of Personalized Health Plan  (Provide Copy to Patient)  The best way to stay healthy is to live a healthy lifestyle. A healthy lifestyle includes regular exercise, eating a well-balanced diet, keeping a healthy weight and not smoking. Regular physical exams and screening tests are another important way to take care of yourself. Preventive exams provided by health care providers can find health problems early when treatment works best and can keep you from getting certain diseases or illnesses. Preventive services include exams, lab tests, screenings, shots, monitoring and information to help you take care of your own health. All people over 65 should have a pneumonia shot. Pneumonia shots are usually only needed once in a lifetime unless your doctor decides differently. All people over 65 should have a yearly flu shot. People over 65 are at medium to high risk for Hepatitis B. Three shots are needed for complete protection. In addition to your physical exam, some screening tests are recommended:    Bone mass measurement (dexa scan) is recommended every two years if you have certain risk factors, such as personal history of vertebral fracture or chronic steroid medication use    Diabetes Mellitus screening is recommended every year. Glaucoma is an eye disease caused by high pressure in the eye. An eye exam is recommended every year. Cardiovascular screening tests that check your cholesterol and other blood fat (lipid) levels are recommended every five years. Colorectal Cancer screening tests help to find pre-cancerous polyps (growths in the colon) so they can be removed before they turn into cancer. Tests ordered for screening depend on your personal and family history risk factors.     Screening for Prostate Cancer is recommended yearly with a digital rectal exam and/or a PSA test    Here is a list of your current Health Maintenance items with a due date:  Health Maintenance   Topic Date Due    Pneumococcal 19-64 Medium Risk (1 of 1 - PPSV23) 09/22/2017 (Originally 12/17/1979)    HEMOGLOBIN A1C Q6M  07/23/2017    EYE EXAM RETINAL OR DILATED Q1  09/09/2017    MICROALBUMIN Q1  01/23/2018    LIPID PANEL Q1  01/23/2018    FOOT EXAM Q1  01/25/2018    COLONOSCOPY  12/02/2018    DTaP/Tdap/Td series (2 - Td) 04/24/2027    Hepatitis C Screening  Completed    INFLUENZA AGE 9 TO ADULT  Completed          Advance Care Planning: Care Instructions  Your Care Instructions  It can be hard to live with an illness that cannot be cured. But if your health is getting worse, you may want to make decisions about end-of-life care. Planning for the end of your life does not mean that you are giving up. It is a way to make sure that your wishes are met. Clearly stating your wishes can make it easier for your loved ones. Making plans while you are still able may also ease your mind and make your final days less stressful and more meaningful. Follow-up care is a key part of your treatment and safety. Be sure to make and go to all appointments, and call your doctor if you are having problems. It's also a good idea to know your test results and keep a list of the medicines you take. What can you do to plan for the end of life? · You can bring these issues up with your doctor. You do not need to wait until your doctor starts the conversation. You might start with \"I would not be willing to live with . Shelvy Setting Shelvy Setting Shelvy Setting \" When you complete this sentence it helps your doctor understand your wishes. · Talk openly and honestly with your doctor. This is the best way to understand the decisions you will need to make as your health changes. Know that you can always change your mind. · Ask your doctor about commonly used life-support measures.  These include tube feedings, breathing machines, and fluids given through a vein (IV). Understanding these treatments will help you decide whether you want them. · You may choose to have these life-supporting treatments for a limited time. This allows a trial period to see whether they will help you. You may also decide that you want your doctor to take only certain measures to keep you alive. It is important to spell out these conditions so that your doctor and family understand them. · Talk to your doctor about how long you are likely to live. He or she may be able to give you an idea of what usually happens with your specific illness. · Think about preparing papers that state your wishes. This way there will not be any confusion about what you want. You can change your instructions at any time. Which papers should you prepare? Advance directives are legal papers that tell doctors how you want to be cared for at the end of your life. You do not need a  to write these papers. Ask your doctor or your [facility] department for information on how to write your advance directives. They may have the forms for each of these types of papers. Make sure your doctor has a copy of these on file, and give a copy to a family member or close friend. · Consider a do-not-resuscitate order (DNR). This order asks that no extra treatments be done if your heart stops or you stop breathing. Extra treatments may include cardiopulmonary resuscitation (CPR), electrical shock to restart your heart, or a machine to breathe for you. If you decide to have a DNR order, ask your doctor to explain and write it. Place the order in your home where everyone can easily see it. · Consider a living will. A living will explains your wishes about life support and other treatments at the end of your life if you become unable to speak for yourself. Living cavazos tell doctors to use or not use treatments that would keep you alive.  You must have one or two witnesses or a notary present when you sign this form.  · Consider a durable power of  for health care. This allows you to name a person to make decisions about your care if you are not able to. Most people ask a close friend or family member. Talk to this person about the kinds of treatments you want and those that you do not want. Make sure this person understands your wishes. These legal papers are simple to change. Tell your doctor what you want to change, and ask him or her to make a note in your medical file. Give your family updated copies of the papers. Where can you learn more? Go to http://yanci"[employer]"ava.info/. Enter P184 in the search box to learn more about \"Advance Care Planning: Care Instructions. \"  Current as of: November 17, 2016  Content Version: 11.2  © 1564-2727 [employer]. Care instructions adapted under license by [employer] (which disclaims liability or warranty for this information). If you have questions about a medical condition or this instruction, always ask your healthcare professional. [address] any warranty or liability for your use of this information. Learning About Durable Power of  for Health Care  What is a durable power of  for health care? A durable power of  for health care is one type of the legal forms called advance directives. It lets you decide who you want to make treatment decisions for you if you cannot speak or decide for yourself. The person you choose is called your health care agent. Another type of advance directive is a living will. It lets you write down what kinds of treatment or life support you want or do not want. What should you think about when choosing a health care agent? Choose your health care agent carefully. This person may or may not be a family member. Talk to the person before you make your final decision. Make sure he or she is comfortable with this responsibility.   It's a good idea to choose someone who:  · Is at least 25years old. · Knows you well and understands what makes life meaningful for you. · Understands your Jain and moral values. · Will do what you want, not what he or she wants. · Will be able to make difficult choices at a stressful time. · Will be able to refuse or stop treatment, if that is what you would want, even if you could die. · Will be firm and confident with health professionals if needed. · Will ask questions to get necessary information. · Lives near you or agrees to travel to you if needed. Your family may help you make medical decisions while you can still be part of that process. But it is important to choose one person to be your health care agent in case you are not able to make decisions for yourself. If you don't fill out the legal form and name a health care agent, the decisions your family can make may be limited. Who will make decisions for you if you do not have a health care agent? If you don't have a health care agent or a living will, your family members may disagree about your medical care. And then some medical professionals who may not know you as well might have to make decisions for you. In some cases, a  makes the decisions. When you name a health care agent, it is very clear who has the power to make health decisions for you. How do you name a health care agent? You name your health care agent on a legal form. It is usually called a durable power of  for health care. Ask your hospital, state bar association, or office on aging where to find these forms. You must sign the form to make it legal. Some states require you to get the form notarized. This means that a person called a  watches you sign the form and then he or she signs the form. Some states also require that two or more witnesses sign the form. Be sure to tell your family members and doctors who your health care agent is.   Keep your forms in a safe place. But make sure that your loved ones know where the forms are. This could be in your desk where you keep other important papers. Make sure your doctor has a copy of your forms. Where can you learn more? Go to http://yanci-ava.info/. Enter [id number] in the search box to learn more about \"Learning About Durable Power of  for [facility]. \"  Current as of: November 17, 2016  Content Version: 11.2  © 3066-7717 [employer]. Care instructions adapted under license by [employer] (which disclaims liability or warranty for this information). If you have questions about a medical condition or this instruction, always ask your healthcare professional. [address] any warranty or liability for your use of this information. Deciding About Life-Prolonging Treatment  What is life-prolonging treatment? There are many kinds of treatment that can help you live longer. These may be needed for only a short time until your illness improves. Or you may use them over the long term to help keep you alive. Some treatments include the use of:  · Medicines to slow the progress of certain diseases, such as heart disease, diabetes, cancer, AIDS, or Alzheimer's disease. · Antibiotics to treat serious infections, such as pneumonia. · Dialysis to clean your blood if your kidneys stop working. · A breathing machine to help you breathe if you can't breathe on your own. This machine pumps air into your lungs through a tube put into your throat. · A feeding tube or an intravenous (IV) line to give you food and fluids if you can't eat or drink. · Cardiopulmonary resuscitation (CPR) to try to restart your heart. The decision to receive treatments that may help you live longer is a personal one. You may want your doctor to do everything possible to keep you alive, even when your chance for recovery is small.  Or you may choose to only have care to manage your pain and other symptoms. What are key points about this decision? · If there is a good chance that your illness can be cured or managed, your doctor may advise you to first try available treatments. If these don't work, then you might think about stopping treatment. · If you stop treatment, you will still receive care that focuses on pain relief and comfort. · A decision to stop treatment that keeps you alive does not have to be permanent. You can always change your mind if your health starts to improve. · Even though treatment focuses on helping you live longer, it may cause side effects that can greatly affect your quality of life. And it could affect how you spend time with your family and friends. · If you still have personal goals that you want to pursue, you may want treatment that keeps you alive long enough to reach them. Why might you choose life-prolonging treatment? · There is a good chance that your illness can be cured or managed. · You think you can manage the possible side effects of treatment. · You don't think treatment will get in the way of your quality of life. · You have personal goals that you still want to pursue and achieve. Why might you choose to stop life-prolonging treatment? · Your chance of surviving your illness is very low. · You have tried all possible treatments for your illness, but they have not helped. · You can no longer deal with the side effects of treatment. · You have already met the goals you set out to achieve in your life. Your decision  Thinking about the facts and your feelings can help you make a decision that is right for you. Be sure you understand the benefits and risks of your options, and think about what else you need to do before you make the decision. Where can you learn more? Go to http://yanci-ava.info/. Enter S311 in the search box to learn more about \"Deciding About Life-Prolonging Treatment. \"  Current as of: February 24, 2016  Content Version: 11.2  © 2242-7664 [employer]. Care instructions adapted under license by [employer] (which disclaims liability or warranty for this information). If you have questions about a medical condition or this instruction, always ask your healthcare professional. [address] any warranty or liability for your use of this information. Learning About Living [place]  What is a living will? A living will is a legal form you use to write down the kind of care you want at the end of your life. It is used by the health professionals who will treat you if you aren't able to decide for yourself. If you put your wishes in writing, your loved ones and others will know what kind of care you want. They won't need to guess. This can ease your mind and be helpful to others. A living will is not the same as an estate or property will. An estate will explains what you want to happen with your money and property after you die. Is a living will a legal document? A living will is a legal document. Each state has its own laws about living cavazos. If you move to another state, make sure that your living will is legal in the state where you now live. Or you might use a universal form that has been approved by many states. This kind of form can sometimes be completed and stored online. Your electronic copy will then be available wherever you have a connection to the Internet. In most cases, doctors will respect your wishes even if you have a form from a different state. · You don't need an  to complete a living will. But legal advice can be helpful if your state's laws are unclear, your health history is complicated, or your family can't agree on what should be in your living will. · You can change your living will at any time. Some people find that their wishes about end-of-life care change as their health changes.   · In addition to making a living will, think about completing a medical power of  form. This form lets you name the person you want to make end-of-life treatment decisions for you (your \"health care agent\") if you're not able to. Many hospitals and nursing homes will give you the forms you need to complete a living will and a medical power of . · Your living will is used only if you can't make or communicate decisions for yourself anymore. If you become able to make decisions again, you can accept or refuse any treatment, no matter what you wrote in your living will. · Your state may offer an online registry. This is a place where you can store your living will online so the doctors and nurses who need to treat you can find it right away. What should you think about when creating a living will? Talk about your end-of-life wishes with your family members and your doctor. Let them know what you want. That way the people making decisions for you won't be surprised by your choices. Think about these questions as you make your living will:  · Do you know enough about life support methods that might be used? If not, talk to your doctor so you know what might be done if you can't breathe on your own, your heart stops, or you're unable to swallow. · What things would you still want to be able to do after you receive life-support methods? Would you want to be able to walk? To speak? To eat on your own? To live without the help of machines? · If you have a choice, where do you want to be cared for? In your home? At a hospital or nursing home? · Do you want certain Mandaeism practices performed if you become very ill? · If you have a choice at the end of your life, where would you prefer to die? At home? In a hospital or nursing home? Somewhere else? · Would you prefer to be buried or cremated? · Do you want your organs to be donated after you die? What should you do with your living will?   · Make sure that your family members and your health care agent have copies of your living will. · Give your doctor a copy of your living will to keep in your medical record. If you have more than one doctor, make sure that each one has a copy. · You may want to put a copy of your living will where it can be easily found. Where can you learn more? Go to http://yanci-ava.info/. Enter T497 in the search box to learn more about \"Learning About Living [place]. \"  Current as of: February 24, 2016  Content Version: 11.2  © 8284-9644 [employer]. Care instructions adapted under license by [employer] (which disclaims liability or warranty for this information). If you have questions about a medical condition or this instruction, always ask your healthcare professional. [address] any warranty or liability for your use of this information. Advance Directives: Care Instructions  Your Care Instructions  An advance directive is a legal way to state your wishes at the end of your life. It tells your family and your doctor what to do if you can no longer say what you want. There are two main types of advance directives. You can change them any time that your wishes change. · A living will tells your family and your doctor your wishes about life support and other treatment. · A durable power of  for health care lets you name a person to make treatment decisions for you when you can't speak for yourself. This person is called a health care agent. If you do not have an advance directive, decisions about your medical care may be made by a doctor or a  who doesn't know you. It may help to think of an advance directive as a gift to the people who care for you. If you have one, they won't have to make tough decisions by themselves. Follow-up care is a key part of your treatment and safety.  Be sure to make and go to all appointments, and call your doctor if you are having problems. It's also a good idea to know your test results and keep a list of the medicines you take. How can you care for yourself at home? · Discuss your wishes with your loved ones and your doctor. This way, there are no surprises. · Many states have a unique form. Or you might use a universal form that has been approved by many states. This kind of form can sometimes be completed and stored online. Your electronic copy will then be available wherever you have a connection to the Internet. In most cases, doctors will respect your wishes even if you have a form from a different state. · You don't need a  to do an advance directive. But you may want to get legal advice. · Think about these questions when you prepare an advance directive:  ¨ Who do you want to make decisions about your medical care if you are not able to? Many people choose a family member or close friend. ¨ Do you know enough about life support methods that might be used? If not, talk to your doctor so you understand. ¨ What are you most afraid of that might happen? You might be afraid of having pain, losing your independence, or being kept alive by machines. ¨ Where would you prefer to die? Choices include your home, a hospital, or a nursing home. ¨ Would you like to have information about hospice care to support you and your family? ¨ Do you want to donate organs when you die? ¨ Do you want certain Quaker practices performed before you die? If so, put your wishes in the advance directive. · Read your advance directive every year, and make changes as needed. When should you call for help? Be sure to contact your doctor if you have any questions. Where can you learn more? Go to http://yanci-ava.info/. Enter R264 in the search box to learn more about \"Advance Directives: Care Instructions. \"  Current as of: November 17, 2016  Content Version: 11.2  © 6782-7219 [employer], [facility].  Care instructions adapted under license by [employer] (which disclaims liability or warranty for this information). If you have questions about a medical condition or this instruction, always ask your healthcare professional. [address] any warranty or liability for your use of this information. Deciding About Being on a Ventilator When You Have a Terminal Illness  Your Care Instructions  A ventilator is a life-support machine that helps you breathe if you can no longer breathe on your own. The machine provides oxygen to your lungs through a tube. The tube enters your mouth and goes down your throat to your lungs. Most people on ventilators have to be fed through another tube that goes into the stomach. You may feel that being on a ventilator would prevent a \"natural\" death or would keep you alive longer than necessary. Or you may feel that being on a ventilator would extend your life so you can do certain things, such as saying good-bye to loved ones. The decision about whether to be on a ventilator is a personal one. Be sure to talk it over with your doctor and loved ones. Follow-up care is a key part of your treatment and safety. Be sure to make and go to all appointments, and call your doctor if you are having problems. It's also a good idea to know your test results and keep a list of the medicines you take. Why might you want to be on a ventilator? · You think you may be able to return to your normal activities. · You need help breathing because of a short illness or a problem that is not related to your terminal illness. · You would like more time to say good-bye. · You feel that there are more benefits than risks. Why might you not want to be on a ventilator? · You have other long-term health problems. · You may not be able to return to your normal activities. · You want a calm, peaceful death.  You do not want to spend the rest of your life on a ventilator. · You feel that there are more risks than benefits. When should you call for help? Be sure to contact your doctor if:  · You want to learn more about being on a ventilator. · You change your mind about being on a ventilator. Where can you learn more? Go to http://yanci-ava.info/. Enter N497 in the search box to learn more about \"Deciding About Being on a Ventilator When You Have a Terminal Illness. \"  Current as of: February 24, 2016  Content Version: 11.2  © 9390-2854 [employer]. Care instructions adapted under license by [employer] (which disclaims liability or warranty for this information). If you have questions about a medical condition or this instruction, always ask your healthcare professional. [address] any warranty or liability for your use of this information. Potassium-Rich Diet: Care Instructions  Your Care Instructions  Potassium is a mineral. It helps keep the right mix of fluids in your body. It also helps your nerves and muscles work as they should. You'll find it in milk and meats. It's also in all fresh foods, including fruits and vegetables. Most adults need about 5 grams of potassium a day. The foods you eat should supply all that you need. Some health conditions can cause a loss of potassium. For example, kidney problems and stomach problems with vomiting and diarrhea can cause you to lose this mineral. Some medicines, such as water pills (diuretics), can cause low potassium. If you can't get enough potassium from what you eat, your doctor may advise you to take supplements. Follow-up care is a key part of your treatment and safety. Be sure to make and go to all appointments, and call your doctor if you are having problems. It's also a good idea to know your test results and keep a list of the medicines you take. How can you care for yourself at home?   · Plan your diet around foods that are rich in potassium. Fresh, unprocessed whole foods have the most. These foods include:  ¨ Milk and other dairy products. ¨ Vegetables, especially broccoli, cooked dry beans, tomatoes, potatoes, artichokes, winter squash, and spinach. ¨ Fruits, especially citrus fruits, bananas, and apricots. Dried apricots contain more potassium than fresh apricots. ¨ Meat, poultry, and fish. · Ask your doctor about using a salt substitute or \"light\" salt. These often contain potassium. Where can you learn more? Go to http://yanciGRUZOBZORava.info/. Enter H315 in the search box to learn more about \"Potassium-Rich Diet: Care Instructions. \"  Current as of: July 26, 2016  Content Version: 11.2  © 0954-9303 [employer]. Care instructions adapted under license by [employer] (which disclaims liability or warranty for this information). If you have questions about a medical condition or this instruction, always ask your healthcare professional. [place] [address] any warranty or liability for your use of this information.

## 2017-04-24 NOTE — MR AVS SNAPSHOT
Visit Information Date & Time Provider Department Dept. Phone Encounter #  
 2017  9:45 AM Mac Santosh Peggy Miramontes MD [place] [phone] [id number] Follow-up Instructions Return in about 3 months (around 2017) for f/u DM/HTN. Upcoming Health Maintenance Date Due Pneumococcal 19-64 Medium Risk (1 of 1 - PPSV23) 2017* HEMOGLOBIN A1C Q6M 2017 EYE EXAM RETINAL OR DILATED Q1 2017 MICROALBUMIN Q1 2018 LIPID PANEL Q1 2018 FOOT EXAM Q1 2018 COLONOSCOPY 2018 DTaP/Tdap/Td series (2 - Td) 2027 *Topic was postponed. The date shown is not the original due date. Allergies as of 2017  Review Complete On: 2017 By: Byron Amador LPN No Known Allergies Current Immunizations  Reviewed on 2017 Name Date Influenza Vaccine 2014  9:28 AM, 2014  1:10 AM  
 Influenza Vaccine (Quad) PF 2017, 2015 Reviewed by Mindi Guidry MD on 2017 at  9:55 AM  
You Were Diagnosed With   
  
 Codes Comments Encounter for Medicare annual wellness exam    -  Primary ICD-10-CM: Z00.00 ICD-9-CM: V70.0 Diabetes mellitus, stable ([facility] [address]     ICD-10-CM: E11.9 ICD-9-CM: 250.00 Benign hypertension without CHF     ICD-10-CM: I10 
ICD-9-CM: 401.1 Dyslipidemia     ICD-10-CM: E78.5 ICD-9-CM: 272.4 Medication refill     ICD-10-CM: Z76.0 ICD-9-CM: V68.1 Encounter for chronic pain management     ICD-10-CM: G89.29 ICD-9-CM: V65.49 Vitals BP Pulse Temp Resp Height(growth percentile) Weight(growth percentile) 132/80 (BP 1 Location: Left arm, BP Patient Position: Sitting) (!) 51 96.9 °F (36.1 °C) (Oral) 16 5' 7\" (1.702 m) 231 lb 6.4 oz (105 kg) SpO2 BMI Smoking Status 98% 36.24 kg/m2 Never Smoker Vitals History BMI and BSA Data Body Mass Index Body Surface Area  
 36.24 kg/m 2 2.23 m 2 Preferred Pharmacy Pharmacy Name Phone [facility] [address] [facility], [address] [place] [address] [phone] Your Updated Medication List  
  
   
This list is accurate as of: 4/24/17 10:05 AM.  Always use your most recent med list. amLODIPine-benazepril 5-20 mg per capsule Commonly known as:  LOTREL  
TAKE ONE CAPSULE BY MOUTH ONCE DAILY  
  
 aspirin, buffered 81 mg Tab Take 81 mg by mouth daily. atorvastatin 10 mg tablet Commonly known as:  LIPITOR  
TAKE 1 TABLET BY MOUTH DAILY  
  
 cyclobenzaprine 10 mg tablet Commonly known as:  FLEXERIL  
10 mg. FREESTYLE LITE METER monitoring kit Generic drug:  Blood-Glucose Meter Check fasting sugars daily before breakfast. BRAND MEDICALLY NECESSARY FREESTYLE LITE STRIPS strip Generic drug:  glucose blood VI test strips Check fasting sugars daily before breakfast. BRAND MEDICALLY NECESSARY. FOR FREESTYLE LITE METER  
  
 furosemide 20 mg tablet Commonly known as:  LASIX Take one po daily prn swelling  
  
 gabapentin 300 mg capsule Commonly known as:  NEURONTIN  
TAKE 2 CAPSULES BY MOUTH EVERY MORNING AND 2 CAPSULES EVERY EVENING Lancets Misc Check fasting sugars daily before breakfast. BRAND MEDICALLY NECESSARY. FOR FREESTYLE LITE METER  
  
 metoprolol tartrate 50 mg tablet Commonly known as:  LOPRESSOR  
TAKE ONE TABLET BY MOUTH TWICE DAILY  
  
 naproxen 500 mg tablet Commonly known as:  NAPROSYN  
500 mg.  
  
 oxyCODONE-acetaminophen 7.5-325 mg per tablet Commonly known as:  PERCOCET Take one po daily prn pain Prescriptions Sent to Pharmacy Refills FREESTYLE LITE STRIPS strip 11 Sig: Check fasting sugars daily before breakfast. BRAND MEDICALLY NECESSARY. FOR FREESTYLE LITE METER Class: Normal  
 Pharmacy: [facility], [address] [place] [address] Ph #: [phone]  Lancets misc 11  
 Sig: Check fasting sugars daily before breakfast. BRAND MEDICALLY NECESSARY. FOR FREESTYLE LITE METER Class: Normal  
 Pharmacy: [facility] [address] [place] [address], [address] [place] [address] Ph #: [phone]  
 furosemide (LASIX) 20 mg tablet 1 Sig: Take one po daily prn swelling Class: Normal  
 Pharmacy: [facility] [address] [place] [address], [address] [place] [address] Ph #: [phone] We Performed the Following AMB POC DRUG SCREEN () [ [facility]] Follow-up Instructions Return in about 3 months (around 7/24/2017) for f/u DM/HTN. To-Do List   
 04/24/2017 Lab:  HEMOGLOBIN A1C WITH EAG   
  
 04/24/2017 Lab:  LIPID PANEL   
  
 04/24/2017 Lab:  METABOLIC PANEL, COMPREHENSIVE Patient Instructions 1) increase high potassium foods while on lasix 2) follow-up in 3 months or sooner if worsening symptoms. Schedule of Personalized Health Plan (Provide Copy to Patient) The best way to stay healthy is to live a healthy lifestyle. A healthy lifestyle includes regular exercise, eating a well-balanced diet, keeping a healthy weight and not smoking. Regular physical exams and screening tests are another important way to take care of yourself. Preventive exams provided by health care providers can find health problems early when treatment works best and can keep you from getting certain diseases or illnesses. Preventive services include exams, lab tests, screenings, shots, monitoring and information to help you take care of your own health. All people over 65 should have a pneumonia shot. Pneumonia shots are usually only needed once in a lifetime unless your doctor decides differently. All people over 65 should have a yearly flu shot. People over 65 are at medium to high risk for Hepatitis B. Three shots are needed for complete protection. In addition to your physical exam, some screening tests are recommended: 
 
Bone mass measurement (dexa scan) is recommended every two years if you have certain risk factors, such as personal history of vertebral fracture or chronic steroid medication use Diabetes Mellitus screening is recommended every year. Glaucoma is an eye disease caused by high pressure in the eye. An eye exam is recommended every year. Cardiovascular screening tests that check your cholesterol and other blood fat (lipid) levels are recommended every five years. Colorectal Cancer screening tests help to find pre-cancerous polyps (growths in the colon) so they can be removed before they turn into cancer. Tests ordered for screening depend on your personal and family history risk factors. Screening for Prostate Cancer is recommended yearly with a digital rectal exam and/or a PSA test 
 
Here is a list of your current Health Maintenance items with a due date: 
Health Maintenance Topic Date Due  Pneumococcal 19-64 Medium Risk (1 of 1 - PPSV23) 09/22/2017 (Originally 12/17/1979)  HEMOGLOBIN A1C Q6M  07/23/2017  
 EYE EXAM RETINAL OR DILATED Q1  09/09/2017  MICROALBUMIN Q1  01/23/2018  LIPID PANEL Q1  01/23/2018  
 FOOT EXAM Q1  01/25/2018  COLONOSCOPY  12/02/2018  DTaP/Tdap/Td series (2 - Td) 04/24/2027  Hepatitis C Screening  Completed  INFLUENZA AGE 9 TO ADULT  Completed Advance Care Planning: Care Instructions Your Care Instructions It can be hard to live with an illness that cannot be cured. But if your health is getting worse, you may want to make decisions about end-of-life care. Planning for the end of your life does not mean that you are giving up. It is a way to make sure that your wishes are met. Clearly stating your wishes can make it easier for your loved ones.  Making plans while you are still able may also ease your mind and make your final days less stressful and more meaningful. Follow-up care is a key part of your treatment and safety. Be sure to make and go to all appointments, and call your doctor if you are having problems. It's also a good idea to know your test results and keep a list of the medicines you take. What can you do to plan for the end of life? · You can bring these issues up with your doctor. You do not need to wait until your doctor starts the conversation. You might start with \"I would not be willing to live with . Liudmila Byrne \" When you complete this sentence it helps your doctor understand your wishes. · Talk openly and honestly with your doctor. This is the best way to understand the decisions you will need to make as your health changes. Know that you can always change your mind. · Ask your doctor about commonly used life-support measures. These include tube feedings, breathing machines, and fluids given through a vein (IV). Understanding these treatments will help you decide whether you want them. · You may choose to have these life-supporting treatments for a limited time. This allows a trial period to see whether they will help you. You may also decide that you want your doctor to take only certain measures to keep you alive. It is important to spell out these conditions so that your doctor and family understand them. · Talk to your doctor about how long you are likely to live. He or she may be able to give you an idea of what usually happens with your specific illness. · Think about preparing papers that state your wishes. This way there will not be any confusion about what you want. You can change your instructions at any time. Which papers should you prepare? Advance directives are legal papers that tell doctors how you want to be cared for at the end of your life. You do not need a  to write these papers.  Ask your doctor or your state health department for information on how to write your advance directives. They may have the forms for each of these types of papers. Make sure your doctor has a copy of these on file, and give a copy to a family member or close friend. · Consider a do-not-resuscitate order (DNR). This order asks that no extra treatments be done if your heart stops or you stop breathing. Extra treatments may include cardiopulmonary resuscitation (CPR), electrical shock to restart your heart, or a machine to breathe for you. If you decide to have a DNR order, ask your doctor to explain and write it. Place the order in your home where everyone can easily see it. · Consider a living will. A living will explains your wishes about life support and other treatments at the end of your life if you become unable to speak for yourself. Living cavazos tell doctors to use or not use treatments that would keep you alive. You must have one or two witnesses or a notary present when you sign this form. · Consider a durable power of  for health care. This allows you to name a person to make decisions about your care if you are not able to. Most people ask a close friend or family member. Talk to this person about the kinds of treatments you want and those that you do not want. Make sure this person understands your wishes. These legal papers are simple to change. Tell your doctor what you want to change, and ask him or her to make a note in your medical file. Give your family updated copies of the papers. Where can you learn more? Go to http://yanci-ava.info/. Enter P184 in the search box to learn more about \"Advance Care Planning: Care Instructions. \" Current as of: November 17, 2016 Content Version: 11.2 © 6304-9815 [employer]. Care instructions adapted under license by [employer] (which disclaims liability or warranty for this information).  If you have questions about a medical condition or this instruction, always ask your healthcare professional. [place] [address] any warranty or liability for your use of this information. Learning About Durable Power of  for Health Care What is a durable power of  for health care? A durable power of  for health care is one type of the legal forms called advance directives. It lets you decide who you want to make treatment decisions for you if you cannot speak or decide for yourself. The person you choose is called your health care agent. Another type of advance directive is a living will. It lets you write down what kinds of treatment or life support you want or do not want. What should you think about when choosing a health care agent? Choose your health care agent carefully. This person may or may not be a family member. Talk to the person before you make your final decision. Make sure he or she is comfortable with this responsibility. It's a good idea to choose someone who: · Is at least 25years old. · Knows you well and understands what makes life meaningful for you. · Understands your Christianity and moral values. · Will do what you want, not what he or she wants. · Will be able to make difficult choices at a stressful time. · Will be able to refuse or stop treatment, if that is what you would want, even if you could die. · Will be firm and confident with health professionals if needed. · Will ask questions to get necessary information. · Lives near you or agrees to travel to you if needed. Your family may help you make medical decisions while you can still be part of that process. But it is important to choose one person to be your health care agent in case you are not able to make decisions for yourself. If you don't fill out the legal form and name a health care agent, the decisions your family can make may be limited. Who will make decisions for you if you do not have a health care agent? If you don't have a health care agent or a living will, your family members may disagree about your medical care. And then some medical professionals who may not know you as well might have to make decisions for you. In some cases, a  makes the decisions. When you name a health care agent, it is very clear who has the power to make health decisions for you. How do you name a health care agent? You name your health care agent on a legal form. It is usually called a durable power of  for health care. Ask your hospital, state bar association, or office on aging where to find these forms. You must sign the form to make it legal. Some states require you to get the form notarized. This means that a person called a  watches you sign the form and then he or she signs the form. Some states also require that two or more witnesses sign the form. Be sure to tell your family members and doctors who your health care agent is. Keep your forms in a safe place. But make sure that your loved ones know where the forms are. This could be in your desk where you keep other important papers. Make sure your doctor has a copy of your forms. Where can you learn more? Go to http://yanci-ava.info/. Enter [id number] in the search box to learn more about \"Learning About Durable Power of  for [facility]. \" Current as of: November 17, 2016 Content Version: 11.2 © 9970-7376 [employer]. Care instructions adapted under license by [employer] (which disclaims liability or warranty for this information). If you have questions about a medical condition or this instruction, always ask your healthcare professional. [place] [address] any warranty or liability for your use of this information. Deciding About Life-Prolonging Treatment What is life-prolonging treatment? There are many kinds of treatment that can help you live longer.  These may be needed for only a short time until your illness improves. Or you may use them over the long term to help keep you alive. Some treatments include the use of: · Medicines to slow the progress of certain diseases, such as heart disease, diabetes, cancer, AIDS, or Alzheimer's disease. · Antibiotics to treat serious infections, such as pneumonia. · Dialysis to clean your blood if your kidneys stop working. · A breathing machine to help you breathe if you can't breathe on your own. This machine pumps air into your lungs through a tube put into your throat. · A feeding tube or an intravenous (IV) line to give you food and fluids if you can't eat or drink. · Cardiopulmonary resuscitation (CPR) to try to restart your heart. The decision to receive treatments that may help you live longer is a personal one. You may want your doctor to do everything possible to keep you alive, even when your chance for recovery is small. Or you may choose to only have care to manage your pain and other symptoms. What are key points about this decision? · If there is a good chance that your illness can be cured or managed, your doctor may advise you to first try available treatments. If these don't work, then you might think about stopping treatment. · If you stop treatment, you will still receive care that focuses on pain relief and comfort. · A decision to stop treatment that keeps you alive does not have to be permanent. You can always change your mind if your health starts to improve. · Even though treatment focuses on helping you live longer, it may cause side effects that can greatly affect your quality of life. And it could affect how you spend time with your family and friends. · If you still have personal goals that you want to pursue, you may want treatment that keeps you alive long enough to reach them. Why might you choose life-prolonging treatment? · There is a good chance that your illness can be cured or managed. · You think you can manage the possible side effects of treatment. · You don't think treatment will get in the way of your quality of life. · You have personal goals that you still want to pursue and achieve. Why might you choose to stop life-prolonging treatment? · Your chance of surviving your illness is very low. · You have tried all possible treatments for your illness, but they have not helped. · You can no longer deal with the side effects of treatment. · You have already met the goals you set out to achieve in your life. Your decision Thinking about the facts and your feelings can help you make a decision that is right for you. Be sure you understand the benefits and risks of your options, and think about what else you need to do before you make the decision. Where can you learn more? Go to http://yanci-ava.info/. Enter C251 in the search box to learn more about \"Deciding About Life-Prolonging Treatment. \" Current as of: February 24, 2016 Content Version: 11.2 © 6329-4654 [employer]. Care instructions adapted under license by [employer] (which disclaims liability or warranty for this information). If you have questions about a medical condition or this instruction, always ask your healthcare professional. [address] any warranty or liability for your use of this information. Nelda Barber [address] What is a living will? A living will is a legal form you use to write down the kind of care you want at the end of your life. It is used by the health professionals who will treat you if you aren't able to decide for yourself. If you put your wishes in writing, your loved ones and others will know what kind of care you want. They won't need to guess. This can ease your mind and be helpful to others. A living will is not the same as an estate or property will. An estate will explains what you want to happen with your money and property after you die. Is a living will a legal document? A living will is a legal document. Each state has its own laws about living cavazos. If you move to another state, make sure that your living will is legal in the state where you now live. Or you might use a universal form that has been approved by many states. This kind of form can sometimes be completed and stored online. Your electronic copy will then be available wherever you have a connection to the Internet. In most cases, doctors will respect your wishes even if you have a form from a different state. · You don't need an  to complete a living will. But legal advice can be helpful if your state's laws are unclear, your health history is complicated, or your family can't agree on what should be in your living will. · You can change your living will at any time. Some people find that their wishes about end-of-life care change as their health changes. · In addition to making a living will, think about completing a medical power of  form. This form lets you name the person you want to make end-of-life treatment decisions for you (your \"health care agent\") if you're not able to. Many hospitals and nursing homes will give you the forms you need to complete a living will and a medical power of . · Your living will is used only if you can't make or communicate decisions for yourself anymore. If you become able to make decisions again, you can accept or refuse any treatment, no matter what you wrote in your living will. · Your state may offer an online registry. This is a place where you can store your living will online so the doctors and nurses who need to treat you can find it right away. What should you think about when creating a living will? Talk about your end-of-life wishes with your family members and your doctor. Let them know what you want. That way the people making decisions for you won't be surprised by your choices. Think about these questions as you make your living will: · Do you know enough about life support methods that might be used? If not, talk to your doctor so you know what might be done if you can't breathe on your own, your heart stops, or you're unable to swallow. · What things would you still want to be able to do after you receive life-support methods? Would you want to be able to walk? To speak? To eat on your own? To live without the help of machines? · If you have a choice, where do you want to be cared for? In your home? At a hospital or nursing home? · Do you want certain Scientology practices performed if you become very ill? · If you have a choice at the end of your life, where would you prefer to die? At home? In a hospital or nursing home? Somewhere else? · Would you prefer to be buried or cremated? · Do you want your organs to be donated after you die? What should you do with your living will? · Make sure that your family members and your health care agent have copies of your living will. · Give your doctor a copy of your living will to keep in your medical record. If you have more than one doctor, make sure that each one has a copy. · You may want to put a copy of your living will where it can be easily found. Where can you learn more? Go to http://yanci-ava.info/. Enter Q303 in the search box to learn more about \"Learning About Living [place]. \" Current as of: February 24, 2016 Content Version: 11.2 © 8782-3984 [employer]Stand.. Care instructions adapted under license by [employer] (which disclaims liability or warranty for this information).  If you have questions about a medical condition or this instruction, always ask your healthcare professional. [employer] disclaims any warranty or liability for your use of this information. Advance Directives: Care Instructions Your Care Instructions An advance directive is a legal way to state your wishes at the end of your life. It tells your family and your doctor what to do if you can no longer say what you want. There are two main types of advance directives. You can change them any time that your wishes change. · A living will tells your family and your doctor your wishes about life support and other treatment. · A durable power of  for health care lets you name a person to make treatment decisions for you when you can't speak for yourself. This person is called a health care agent. If you do not have an advance directive, decisions about your medical care may be made by a doctor or a  who doesn't know you. It may help to think of an advance directive as a gift to the people who care for you. If you have one, they won't have to make tough decisions by themselves. Follow-up care is a key part of your treatment and safety. Be sure to make and go to all appointments, and call your doctor if you are having problems. It's also a good idea to know your test results and keep a list of the medicines you take. How can you care for yourself at home? · Discuss your wishes with your loved ones and your doctor. This way, there are no surprises. · Many states have a unique form. Or you might use a universal form that has been approved by many states. This kind of form can sometimes be completed and stored online. Your electronic copy will then be available wherever you have a connection to the Internet. In most cases, doctors will respect your wishes even if you have a form from a different state. · You don't need a  to do an advance directive. But you may want to get legal advice. · Think about these questions when you prepare an advance directive: ¨ Who do you want to make decisions about your medical care if you are not able to? Many people choose a family member or close friend. ¨ Do you know enough about life support methods that might be used? If not, talk to your doctor so you understand. ¨ What are you most afraid of that might happen? You might be afraid of having pain, losing your independence, or being kept alive by machines. ¨ Where would you prefer to die? Choices include your home, a hospital, or a nursing home. ¨ Would you like to have information about hospice care to support you and your family? ¨ Do you want to donate organs when you die? ¨ Do you want certain Nondenominational practices performed before you die? If so, put your wishes in the advance directive. · Read your advance directive every year, and make changes as needed. When should you call for help? Be sure to contact your doctor if you have any questions. Where can you learn more? Go to http://yanci-ava.info/. Enter R264 in the search box to learn more about \"Advance Directives: Care Instructions. \" Current as of: November 17, 2016 Content Version: 11.2 © 9839-2190 [employer]. Care instructions adapted under license by [employer] (which disclaims liability or warranty for this information). If you have questions about a medical condition or this instruction, always ask your healthcare professional. [address] any warranty or liability for your use of this information. Deciding About Being on a Ventilator When You Have a Terminal Illness Your Care Instructions A ventilator is a life-support machine that helps you breathe if you can no longer breathe on your own. The machine provides oxygen to your lungs through a tube. The tube enters your mouth and goes down your throat to your lungs. Most people on ventilators have to be fed through another tube that goes into the stomach. You may feel that being on a ventilator would prevent a \"natural\" death or would keep you alive longer than necessary. Or you may feel that being on a ventilator would extend your life so you can do certain things, such as saying good-bye to loved ones. The decision about whether to be on a ventilator is a personal one. Be sure to talk it over with your doctor and loved ones. Follow-up care is a key part of your treatment and safety. Be sure to make and go to all appointments, and call your doctor if you are having problems. It's also a good idea to know your test results and keep a list of the medicines you take. Why might you want to be on a ventilator? · You think you may be able to return to your normal activities. · You need help breathing because of a short illness or a problem that is not related to your terminal illness. · You would like more time to say good-bye. · You feel that there are more benefits than risks. Why might you not want to be on a ventilator? · You have other long-term health problems. · You may not be able to return to your normal activities. · You want a calm, peaceful death. You do not want to spend the rest of your life on a ventilator. · You feel that there are more risks than benefits. When should you call for help? Be sure to contact your doctor if: 
· You want to learn more about being on a ventilator. · You change your mind about being on a ventilator. Where can you learn more? Go to http://yanci-ava.info/. Enter N147 in the search box to learn more about \"Deciding About Being on a Ventilator When You Have a Terminal Illness. \" Current as of: February 24, 2016 Content Version: 11.2 © 5208-1598 [employer]. Care instructions adapted under license by [employer] (which disclaims liability or warranty for this information).  If you have questions about a medical condition or this instruction, always ask your healthcare professional. [address] any warranty or liability for your use of this information. Potassium-Rich Diet: Care Instructions Your Care Instructions Potassium is a mineral. It helps keep the right mix of fluids in your body. It also helps your nerves and muscles work as they should. You'll find it in milk and meats. It's also in all fresh foods, including fruits and vegetables. Most adults need about 5 grams of potassium a day. The foods you eat should supply all that you need. Some health conditions can cause a loss of potassium. For example, kidney problems and stomach problems with vomiting and diarrhea can cause you to lose this mineral. Some medicines, such as water pills (diuretics), can cause low potassium. If you can't get enough potassium from what you eat, your doctor may advise you to take supplements. Follow-up care is a key part of your treatment and safety. Be sure to make and go to all appointments, and call your doctor if you are having problems. It's also a good idea to know your test results and keep a list of the medicines you take. How can you care for yourself at home? · Plan your diet around foods that are rich in potassium. Fresh, unprocessed whole foods have the most. These foods include: ¨ Milk and other dairy products. ¨ Vegetables, especially broccoli, cooked dry beans, tomatoes, potatoes, artichokes, winter squash, and spinach. ¨ Fruits, especially citrus fruits, bananas, and apricots. Dried apricots contain more potassium than fresh apricots. ¨ Meat, poultry, and fish. · Ask your doctor about using a salt substitute or \"light\" salt. These often contain potassium. Where can you learn more? Go to http://yanci-ava.info/. Enter H315 in the search box to learn more about \"Potassium-Rich Diet: Care Instructions. \" Current as of: July 26, 2016 Content Version: 11.2 © 8626-5731 [employer]. Care instructions adapted under license by [employer] (which disclaims liability or warranty for this information). If you have questions about a medical condition or this instruction, always ask your healthcare professional. [address] any warranty or liability for your use of this information. Introducing [facility]! [employer] introduces [facility] patient portal. Now you can access parts of your medical record, email your doctor's office, and request medication refills online. 1. In your internet browser, go to [email or web address] 2. Click on the First Time User? Click Here link in the Sign In box. You will see the New Member Sign Up page. 3. Enter your [facility] Access Code exactly as it appears below. You will not need to use this code after youve completed the sign-up process. If you do not sign up before the expiration date, you must request a new code. · [facility] Access Code: [email or web address] Expires: 7/23/2017  9:27 AM 
 
4. Enter the last four digits of your Social Security Number (xxxx) and Date of Birth (mm/dd/yyyy) as indicated and click Submit. You will be taken to the next sign-up page. 5. Create a [facility] ID. This will be your [facility] login ID and cannot be changed, so think of one that is secure and easy to remember. 6. Create a [facility] password. You can change your password at any time. 7. Enter your Password Reset Question and Answer. This can be used at a later time if you forget your password. 8. Enter your e-mail address. You will receive e-mail notification when new information is available in [address] 9. Click Sign Up. You can now view and download portions of your medical record. 10. Click the Download Summary menu link to download a portable copy of your medical information.  
 
If you have questions, please visit the Frequently Asked Questions section of the [facility]. Remember, Home Inventory S[pecialistshart is NOT to be used for urgent needs. For medical emergencies, dial 911. Now available from your iPhone and Android! Please provide this summary of care documentation to your next provider. Your primary care clinician is listed as Juan Rodríguez. If you have any questions after today's visit, please call [phone].

## 2017-04-24 NOTE — PROGRESS NOTES
ROOM # 1    Dane Remy presents today for   Chief Complaint   Patient presents with    Hypertension    Diabetes    Annual Wellness Visit    Medication Refill     Requested Prescriptions     Pending Prescriptions Disp Refills    furosemide (LASIX) 20 mg tablet 30 Tab 1     Sig: Take one po daily prn swelling         Dane Remy preferred language for health care discussion is english/other. Is someone accompanying this pt? no    Is the patient using any DME equipment during OV? no    Depression Screening:  PHQ 2 / 9, over the last two weeks 4/24/2017 9/22/2016 3/23/2016 3/23/2016 4/15/2015 3/20/2014   Little interest or pleasure in doing things Not at all Not at all Not at all Not at all Several days Several days   Feeling down, depressed or hopeless Not at all Not at all Not at all Not at all Not at all Several days   Total Score PHQ 2 0 0 0 0 1 2       Learning Assessment:  Learning Assessment 1/14/2015 12/20/2013   PRIMARY LEARNER Patient Patient   HIGHEST LEVEL OF EDUCATION - PRIMARY LEARNER  DID NOT GRADUATE HIGH SCHOOL DID NOT GRADUATE [address] LEARNER NONE NONE   PRIMARY [address] [facility]    NEED - No   LEARNER PREFERENCE PRIMARY DEMONSTRATION DEMONSTRATION   LEARNING SPECIAL TOPICS - no   ANSWERED BY patient patient   RELATIONSHIP SELF SELF       Abuse Screening:  No flowsheet data found. Fall Risk  No flowsheet data found. Health Maintenance reviewed and discussed per provider. Yes    Dane Remy is due for pneumonia vax, DTAP vax. Please order/place referral if appropriate. Advance Directive:  1. Do you have an advance directive in place? Patient Reply: no    2. If not, would you like material regarding how to put one in place? Patient Reply: no    Coordination of Care:  1. Have you been to the ER, urgent care clinic since your last visit? Hospitalized since your last visit? no    2.  Have you seen or consulted any other health care providers outside of the [address] [place] [address] since your last visit? Include any pap smears or colon screening.  no

## 2017-04-24 NOTE — PROGRESS NOTES
Cathy Trivedi is a 61 y.o. male and presents for annual Medicare Wellness Visit. Problem List: Reviewed with patient and discussed risk factors. Patient Active Problem List   Diagnosis Code    Benign hypertensive heart disease without heart failure I11.9    Pain, low back M54.5    Lymphedema I89.0    Lumbago M54.5    Thoracic or lumbosacral neuritis or radiculitis, unspecified [id number]    Sciatica M54.30    Pain in limb M79.609    Degeneration of lumbar or lumbosacral intervertebral disc M51.37    Displacement of lumbar intervertebral disc without myelopathy M51.26    Other chronic pain G89.29    Encounter for long-term (current) use of other medications Z79.899    CKD (chronic kidney disease) stage 2, GFR 60-89 ml/min N18.2    Other and unspecified hyperlipidemia E78.5    Medication refill Z76.0    H/O colonoscopy Z98.890    Anxiety F41.9    Osteoarthritis of knee M17.10    Osteoarthritis of left knee M17.12    Infected sebaceous cyst L72.3, L08.9    Back abscess L02.212    Diabetic eye exam ([facility] [address] E11.9, Z01.00       Current medical providers:  Patient Care Team:  Shahram Baumann MD as PCP - General (Internal Medicine)  Erinn De La Rosa MD as Physician (Orthopedic Surgery)    PSH: Reviewed with patient  Past Surgical History:   Procedure Laterality Date    HX COLONOSCOPY      HX ORTHOPAEDIC      left elbow surgery    HX ORTHOPAEDIC Left 1/19/16    left Total Knee replacement        SH: Reviewed with patient  Social History   Substance Use Topics    Smoking status: Never Smoker    Smokeless tobacco: Never Used      Comment: Pt counseled to continue to not smoke.     Alcohol use No       FH: Reviewed with patient  Family History   Problem Relation Age of Onset   [facility] Stroke Mother     Hypertension Mother     Seizures Mother     Diabetes Father     Heart Disease Father     Hypertension Father     Other Maternal Grandmother      tumor in abdomen       Medications/Allergies: Reviewed with patient  Current Outpatient Prescriptions on File Prior to Visit   Medication Sig Dispense Refill    oxyCODONE-acetaminophen (PERCOCET) 7.5-325 mg per tablet Take one po daily prn pain 30 Tab 0    atorvastatin (LIPITOR) 10 mg tablet TAKE 1 TABLET BY MOUTH DAILY 90 Tab 3    amLODIPine-benazepril (LOTREL) 5-20 mg per capsule TAKE ONE CAPSULE BY MOUTH ONCE DAILY 90 Cap 3    metoprolol tartrate (LOPRESSOR) 50 mg tablet TAKE ONE TABLET BY MOUTH TWICE DAILY 180 Tab 3    gabapentin (NEURONTIN) 300 mg capsule TAKE 2 CAPSULES BY MOUTH EVERY MORNING AND 2 CAPSULES EVERY EVENING 120 Cap 4    FREESTYLE LITE METER monitoring kit Check fasting sugars daily before breakfast. BRAND MEDICALLY NECESSARY 1 Kit 0    Lancets misc Check fasting sugars daily before breakfast. BRAND MEDICALLY NECESSARY. FOR FREESTYLE LITE METER 100 Each 11    FREESTYLE LITE STRIPS strip Check fasting sugars daily before breakfast. BRAND MEDICALLY NECESSARY. FOR FREESTYLE LITE METER 100 Strip 11    Aspirin, Buffered 81 mg tab Take 81 mg by mouth daily.  furosemide (LASIX) 20 mg tablet Take one po daily prn swelling 30 tablet 1    cyclobenzaprine (FLEXERIL) 10 mg tablet 10 mg.      naproxen (NAPROSYN) 500 mg tablet 500 mg. No current facility-administered medications on file prior to visit. No Known Allergies    Objective:  Visit Vitals    BP (!) 146/93 (BP 1 Location: Left arm, BP Patient Position: Sitting)    Pulse (!) 51    Temp 96.9 °F (36.1 °C) (Oral)    Resp 16    Ht 5' 7\" (1.702 m)    Wt 231 lb 6.4 oz (105 kg)    SpO2 98%    BMI 36.24 kg/m2    Body mass index is 36.24 kg/(m^2). Assessment of cognitive impairment: Alert and oriented x 3    Depression Screen:   PHQ 2 / 9, over the last two weeks 4/24/2017   Little interest or pleasure in doing things Not at all   Feeling down, depressed or hopeless Not at all   Total Score PHQ 2 0       Fall Risk Assessment:  No flowsheet data found.     Functional Ability:   Does the patient exhibit a steady gait? yes   How long did it take the patient to get up and walk from a sitting position? 8 seconds   Is the patient self reliant?  (ie can do own laundry, meals, household chores)  yes     Does the patient handle his/her own medications? yes     Does the patient handle his/her own money? yes     Is the patients home safe (ie good lighting, handrails on stairs and bath, etc.)? yes     Did you notice or did patient express any hearing difficulties? no     Did you notice or did patient express any vision difficulties?   no     Were distance and reading eye charts used? no       Advance Care Planning:   Patient was offered the opportunity to discuss advance care planning:  yes     Does patient have an Advance Directive:  no   If no, did you provide information on Caring Connections? yes       Plan:      Orders Placed This Encounter    METABOLIC PANEL, COMPREHENSIVE    LIPID PANEL    HEMOGLOBIN A1C WITH EAG    AMB POC DRUG SCREEN ()       Health Maintenance   Topic Date Due    Pneumococcal 19-64 Medium Risk (1 of 1 - PPSV23) 09/22/2017 (Originally 12/17/1979)    HEMOGLOBIN A1C Q6M  07/23/2017    EYE EXAM RETINAL OR DILATED Q1  09/09/2017    MICROALBUMIN Q1  01/23/2018    LIPID PANEL Q1  01/23/2018    FOOT EXAM Q1  01/25/2018    COLONOSCOPY  12/02/2018    DTaP/Tdap/Td series (2 - Td) 04/24/2027    Hepatitis C Screening  Completed    INFLUENZA AGE 9 TO ADULT  Completed       *Patient verbalized understanding and agreement with the plan. A copy of the After Visit Summary with personalized health plan was given to the patient today. HEALTH MAINTENANCE AND SCREENING:  Immunizations reviewed, indicated:  Tdap: per pt in last 10 yrs  Pneumovax: pt got.  He was told to get information from the pharmacy that got the rx,    ROS:   General: negative for - chills, fatigue, fever, weight loss or night sweats, positive for weight gain  HEENT: negative for - no sore throat, nasal congestion, vision problems or ear problems  Resp: negative for - cough, shortness of breath or wheezing  CV: negative for - chest pain, palpitations, orthopnea or PND,  GI: negative for - abdominal pain, change in bowel habits, constipation, diarrhea, blood or black tarry stools, or nausea/vomiting  : negative for - dysuria, hematuria, increase urgency and frequency, nocturia  Heme: negative for -excessive bleeding or bruising  Endo: negative for - polydipsia/polyuria or hot or cold intolerance  MSK: negative for - or muscle pain, positive for joint pain, joint swelling , positive for edema  Neuro: negative for - numbness, tingling, headache or dizziness  Derm: negative for - dry skin, hair changes, rash or skin lesion changes  Psych: negative for - anxiety, depression, irritability or mood swings, insomnia    OBJECTIVE:  PHYSICAL EXAM: Vitals:   Vitals:    04/24/17 0938 04/24/17 1005   BP: (!) 146/93 132/80   Pulse: (!) 51    Resp: 16    Temp: 96.9 °F (36.1 °C)    TempSrc: Oral    SpO2: 98%    Weight: 231 lb 6.4 oz (105 kg)    Height: 5' 7\" (1.702 m)      Exam:   Generally: Pleasant male in no acute distress    Cardiac exam: regular, rate, and rhythm. No murmurs, gallops, or rubs. Normal S1 and S2.    Pulmonary exam: Clear to ausculation bilaterally    Abdominal exam: Positive bowel sounds in all four quadrants. Soft. Nondistended. Nontender. No hepatosplenomegaly. Extremities: 2+ dorsalis pedis bilaterally. No pedal edema bilaterally.            LABS/RADIOLOGICAL TESTS:   Lab Results   Component Value Date/Time    WBC 5.0 01/23/2017 10:37 AM    HGB 15.8 01/23/2017 10:37 AM    HCT 48.8 01/23/2017 10:37 AM    PLATELET 829 71/25/5294 10:37 AM     Lab Results   Component Value Date/Time    Sodium 138 01/23/2017 10:37 AM    Potassium 4.2 01/23/2017 10:37 AM    Chloride 100 01/23/2017 10:37 AM    CO2 29 01/23/2017 10:37 AM    Glucose 107 01/23/2017 10:37 AM    BUN 18 01/23/2017 10:37 AM Creatinine 1.14 01/23/2017 10:37 AM     Lab Results   Component Value Date/Time    Cholesterol, total 132 01/23/2017 10:37 AM    HDL Cholesterol 45 01/23/2017 10:37 AM    LDL, calculated 75.8 01/23/2017 10:37 AM    Triglyceride 56 01/23/2017 10:37 AM     No results found for: GPT   Component      Latest Ref Rng & Units 1/23/2017          10:37 AM   Hemoglobin A1c, (calculated)      4.2 - 5.6 % 6.9 (H)     Previous labs    Component      Latest Ref Rng & Units 4/24/2017          10:18 AM   ALCOHOL UR POC          AMPHETAMINES UR POC       Negative   CARISOPRODOL UR POC          COCAINE UR POC       Negative   FENTANYL UR POC          MDMA/ECSTASY UR POC       Negative   METHADONE UR POC       Negative   METHAMPHETAMINE UR POC       Negative   METHYLPHENIDATE UR POC          OPIATES UR POC       Negative   OXYCODONE UR POC       Negative   PHENCYCLIDINE UR POC          PROPOXYPHENE UR POC          TRAMADOL UR POC          TRICYCLICS UR POC          BARBITURATES UR POC       Negative   BENZODIAZEPINES UR POC       Negative   CANNABINOIDS UR POC       Negative   BUPRENORPHINE UR POC          Pt notified of results at [address] today, 4/24/17    ASSESSMENT/PLAN:      ICD-10-CM ICD-9-CM    1. Encounter for Medicare annual wellness exam Z00.00 V70.0    2. Diabetes mellitus, stable ([address] E11.9 250.00 We will see what the labs show. Continue diet and exercise. HEMOGLOBIN A1C WITH EAG      FREESTYLE LITE STRIPS strip      Lancets misc   3. Benign hypertension without CHF I10 401.1 Stable. Continue the lotrel. Lopressor, diet and exercise. 4. Dyslipidemia E78.5 272.4 We will see what the labs show. Continue the lipitor, diet and exercise. METABOLIC PANEL, COMPREHENSIVE      LIPID PANEL   5. Medication refill Z76.0 V68.1 furosemide (LASIX) 20 mg tablet. He will increase high potassium foods while on the lasix. 6. Encounter for chronic pain management G89.29 V65.49 AMB POC DRUG SCREEN ()  UDS negative.  Last took percocet yesterday. 7.     Requested Prescriptions     Signed Prescriptions Disp Refills    FREESTYLE LITE STRIPS strip 100 Strip 11     Sig: Check fasting sugars daily before breakfast. BRAND MEDICALLY NECESSARY. FOR FREESTYLE LITE METER    Lancets misc 100 Each 11     Sig: Check fasting sugars daily before breakfast. BRAND MEDICALLY NECESSARY. FOR FREESTYLE LITE METER    furosemide (LASIX) 20 mg tablet 30 Tab 1     Sig: Take one po daily prn swelling       8. Patient verbalized understanding and agreement with the plan. 9. Patient was given an after visit summary. 10. Pain agreement signed and UDS done. Follow-up Disposition:  Return in about 3 months (around 7/24/2017) for f/u DM/HTN. or sooner if worsening symptoms.       Alma Rai MD

## 2017-05-15 DIAGNOSIS — Z76.0 MEDICATION REFILL: ICD-10-CM

## 2017-05-15 NOTE — TELEPHONE ENCOUNTER
Requested Prescriptions     Pending Prescriptions Disp Refills    oxyCODONE-acetaminophen (PERCOCET) 7.5-325 mg per tablet 30 Tab 0     Sig: Take one po daily prn pain

## 2017-05-16 RX ORDER — OXYCODONE AND ACETAMINOPHEN 7.5; 325 MG/1; MG/1
TABLET ORAL
Qty: 30 TAB | Refills: 0 | Status: SHIPPED | OUTPATIENT
Start: 2017-05-16 | End: 2017-06-19 | Stop reason: SDUPTHER

## 2017-05-16 NOTE — TELEPHONE ENCOUNTER
Checked  and no aberrancies seen. Printed rx for:    Requested Prescriptions     Signed Prescriptions Disp Refills    oxyCODONE-acetaminophen (PERCOCET) 7.5-325 mg per tablet 30 Tab 0     Sig: Take one po daily prn pain     Authorizing Provider: Don Brar     Please let pt know that this is ready for .

## 2017-05-18 DIAGNOSIS — Z76.0 MEDICATION REFILL: ICD-10-CM

## 2017-05-18 RX ORDER — AMLODIPINE AND BENAZEPRIL HYDROCHLORIDE 5; 20 MG/1; MG/1
CAPSULE ORAL
Qty: 90 CAP | Refills: 3 | Status: SHIPPED | OUTPATIENT
Start: 2017-05-18 | End: 2018-05-22 | Stop reason: SDUPTHER

## 2017-05-18 NOTE — TELEPHONE ENCOUNTER
Requested Prescriptions     Pending Prescriptions Disp Refills    amLODIPine-benazepril (LOTREL) 5-20 mg per capsule 90 Cap 3     Sig: TAKE ONE CAPSULE BY MOUTH ONCE DAILY

## 2017-06-19 DIAGNOSIS — Z76.0 MEDICATION REFILL: ICD-10-CM

## 2017-06-20 RX ORDER — OXYCODONE AND ACETAMINOPHEN 7.5; 325 MG/1; MG/1
TABLET ORAL
Qty: 30 TAB | Refills: 0 | Status: SHIPPED | OUTPATIENT
Start: 2017-06-20 | End: 2017-07-24 | Stop reason: SDUPTHER

## 2017-06-20 NOTE — TELEPHONE ENCOUNTER
Checked  and no aberrancies seen. Printed rx :  Requested Prescriptions     Signed Prescriptions Disp Refills    oxyCODONE-acetaminophen (PERCOCET) 7.5-325 mg per tablet 30 Tab 0     Sig: Take one po daily prn pain     Authorizing Provider: Kenyetta Collier     This is ready for . UDS: 04/24/17  Pain agreement signed: 04/24/17.

## 2017-07-24 ENCOUNTER — HOSPITAL ENCOUNTER (OUTPATIENT)
Dept: LAB | Age: 57
Discharge: HOME | End: 2017-07-24
Payer: MEDICARE

## 2017-07-24 ENCOUNTER — OFFICE VISIT (OUTPATIENT)
Dept: INTERNAL MEDICINE CLINIC | Age: 57
End: 2017-07-24

## 2017-07-24 VITALS
SYSTOLIC BLOOD PRESSURE: 133 MMHG | HEART RATE: 56 BPM | DIASTOLIC BLOOD PRESSURE: 88 MMHG | TEMPERATURE: 97.1 F | BODY MASS INDEX: 35.72 KG/M2 | RESPIRATION RATE: 18 BRPM | WEIGHT: 227.6 LBS | OXYGEN SATURATION: 100 % | HEIGHT: 67 IN

## 2017-07-24 DIAGNOSIS — E78.5 DYSLIPIDEMIA: ICD-10-CM

## 2017-07-24 DIAGNOSIS — Z76.0 MEDICATION REFILL: ICD-10-CM

## 2017-07-24 DIAGNOSIS — I10 BENIGN HYPERTENSION WITHOUT CHF: ICD-10-CM

## 2017-07-24 DIAGNOSIS — E11.9 DIABETES MELLITUS, STABLE (HCC): Primary | ICD-10-CM

## 2017-07-24 DIAGNOSIS — G89.29 ENCOUNTER FOR CHRONIC PAIN MANAGEMENT: ICD-10-CM

## 2017-07-24 DIAGNOSIS — E11.9 DIABETES MELLITUS, STABLE (HCC): ICD-10-CM

## 2017-07-24 LAB
ALBUMIN SERPL BCP-MCNC: 3.8 G/DL (ref 3.4–5)
ALBUMIN/GLOB SERPL: 1.2 {RATIO} (ref 0.8–1.7)
ALCOHOL UR POC: NORMAL
ALP SERPL-CCNC: 94 U/L (ref 45–117)
ALT SERPL-CCNC: 45 U/L (ref 16–61)
AMPHETAMINES UR POC: NEGATIVE
ANION GAP BLD CALC-SCNC: 7 MMOL/L (ref 3–18)
AST SERPL W P-5'-P-CCNC: 19 U/L (ref 15–37)
BARBITURATES UR POC: NEGATIVE
BENZODIAZEPINES UR POC: NEGATIVE
BILIRUB SERPL-MCNC: 0.6 MG/DL (ref 0.2–1)
BUN SERPL-MCNC: 18 MG/DL (ref 7–18)
BUN/CREAT SERPL: 15 (ref 12–20)
BUPRENORPHINE UR POC: NORMAL
CALCIUM SERPL-MCNC: 8.9 MG/DL (ref 8.5–10.1)
CANNABINOIDS UR POC: NEGATIVE
CARISOPRODOL UR POC: NORMAL
CHLORIDE SERPL-SCNC: 103 MMOL/L (ref 100–108)
CHOLEST SERPL-MCNC: 129 MG/DL
CO2 SERPL-SCNC: 30 MMOL/L (ref 21–32)
COCAINE UR POC: NEGATIVE
CREAT SERPL-MCNC: 1.18 MG/DL (ref 0.6–1.3)
FENTANYL UR POC: NORMAL
GLOBULIN SER CALC-MCNC: 3.1 G/DL (ref 2–4)
GLUCOSE SERPL-MCNC: 127 MG/DL (ref 74–99)
HBA1C MFR BLD: 6.7 % (ref 4.2–5.6)
HDLC SERPL-MCNC: 43 MG/DL (ref 40–60)
HDLC SERPL: 3 {RATIO} (ref 0–5)
LDLC SERPL CALC-MCNC: 71.8 MG/DL (ref 0–100)
LIPID PROFILE,FLP: NORMAL
MDMA/ECSTASY UR POC: NEGATIVE
METHADONE UR POC: NEGATIVE
METHAMPHETAMINE UR POC: NEGATIVE
METHYLPHENIDATE UR POC: NORMAL
OPIATES UR POC: NEGATIVE
OXYCODONE UR POC: NORMAL
PHENCYCLIDINE UR POC: NORMAL
POTASSIUM SERPL-SCNC: 4 MMOL/L (ref 3.5–5.5)
PROPOXYPHENE UR POC: NORMAL
PROT SERPL-MCNC: 6.9 G/DL (ref 6.4–8.2)
SODIUM SERPL-SCNC: 140 MMOL/L (ref 136–145)
TRAMADOL UR POC: NORMAL
TRICYCLICS UR POC: NORMAL
TRIGL SERPL-MCNC: 71 MG/DL (ref ?–150)
VLDLC SERPL CALC-MCNC: 14.2 MG/DL

## 2017-07-24 PROCEDURE — 36415 COLL VENOUS BLD VENIPUNCTURE: CPT | Performed by: INTERNAL MEDICINE

## 2017-07-24 PROCEDURE — 80053 COMPREHEN METABOLIC PANEL: CPT | Performed by: INTERNAL MEDICINE

## 2017-07-24 PROCEDURE — 80061 LIPID PANEL: CPT | Performed by: INTERNAL MEDICINE

## 2017-07-24 PROCEDURE — 83036 HEMOGLOBIN GLYCOSYLATED A1C: CPT | Performed by: INTERNAL MEDICINE

## 2017-07-24 RX ORDER — OXYCODONE AND ACETAMINOPHEN 7.5; 325 MG/1; MG/1
TABLET ORAL
Qty: 30 TAB | Refills: 0 | Status: SHIPPED | OUTPATIENT
Start: 2017-07-24 | End: 2017-08-31 | Stop reason: SDUPTHER

## 2017-07-24 NOTE — MR AVS SNAPSHOT
Visit Information Date & Time Provider Department Dept. Phone Encounter #  
 7/24/2017  9:45 AM Quynh Lombardo MD [address] [phone] [id number] Follow-up Instructions Return in about 3 months (around 10/24/2017) for f/u DM/HTN/lipids. Upcoming Health Maintenance Date Due Pneumococcal 19-64 Medium Risk (1 of 1 - PPSV23) 9/22/2017* INFLUENZA AGE 9 TO ADULT 8/1/2017 EYE EXAM RETINAL OR DILATED Q1 9/9/2017 HEMOGLOBIN A1C Q6M 10/24/2017 MICROALBUMIN Q1 1/23/2018 FOOT EXAM Q1 1/25/2018 LIPID PANEL Q1 4/24/2018 COLONOSCOPY 12/2/2018 DTaP/Tdap/Td series (2 - Td) 4/24/2027 *Topic was postponed. The date shown is not the original due date. Allergies as of 7/24/2017  Review Complete On: 7/24/2017 By: Sue Lawson MD  
 No Known Allergies Current Immunizations  Reviewed on 4/24/2017 Name Date Influenza Vaccine 9/9/2014  9:28 AM, 2/6/2014  1:10 AM  
 Influenza Vaccine (Quad) PF 1/23/2017, 11/23/2015 Not reviewed this visit You Were Diagnosed With   
  
 Codes Comments Diabetes mellitus, stable [facility]    -  Primary ICD-10-CM: E11.9 ICD-9-CM: 250.00 Benign hypertension without CHF     ICD-10-CM: I10 
ICD-9-CM: 401.1 Dyslipidemia     ICD-10-CM: E78.5 ICD-9-CM: 272.4 Encounter for chronic pain management     ICD-10-CM: G89.29 ICD-9-CM: V65.49 Medication refill     ICD-10-CM: Z76.0 ICD-9-CM: V68.1 Vitals BP Pulse Temp Resp Height(growth percentile) Weight(growth percentile) 133/88 (BP 1 Location: Left arm, BP Patient Position: Sitting) (!) 56 97.1 °F (36.2 °C) (Oral) 18 5' 7\" (1.702 m) 227 lb 9.6 oz (103.2 kg) SpO2 BMI Smoking Status 100% 35.65 kg/m2 Never Smoker Vitals History BMI and BSA Data Body Mass Index Body Surface Area  
 35.65 kg/m 2 2.21 m 2 Preferred Pharmacy Pharmacy Name Phone [facility] [facility], [address] [place] [address] [phone] Your Updated Medication List  
  
   
This list is accurate as of: 7/24/17 10:39 AM.  Always use your most recent med list. amLODIPine-benazepril 5-20 mg per capsule Commonly known as:  LOTREL  
TAKE ONE CAPSULE BY MOUTH ONCE DAILY  
  
 aspirin, buffered 81 mg Tab Take 81 mg by mouth daily. atorvastatin 10 mg tablet Commonly known as:  LIPITOR  
TAKE 1 TABLET BY MOUTH DAILY FREESTYLE LITE METER monitoring kit Generic drug:  Blood-Glucose Meter Check fasting sugars daily before breakfast. BRAND MEDICALLY NECESSARY FREESTYLE LITE STRIPS strip Generic drug:  glucose blood VI test strips Check fasting sugars daily before breakfast. BRAND MEDICALLY NECESSARY. FOR FREESTYLE LITE METER  
  
 furosemide 20 mg tablet Commonly known as:  LASIX Take one po daily prn swelling  
  
 gabapentin 300 mg capsule Commonly known as:  NEURONTIN  
TAKE 2 CAPSULES BY MOUTH EVERY MORNING AND 2 CAPSULES EVERY EVENING Lancets Misc Check fasting sugars daily before breakfast. BRAND MEDICALLY NECESSARY. FOR FREESTYLE LITE METER  
  
 metoprolol tartrate 50 mg tablet Commonly known as:  LOPRESSOR  
TAKE ONE TABLET BY MOUTH TWICE DAILY  
  
 oxyCODONE-acetaminophen 7.5-325 mg per tablet Commonly known as:  PERCOCET Take one po daily prn pain Prescriptions Printed Refills  
 oxyCODONE-acetaminophen (PERCOCET) 7.5-325 mg per tablet 0 Sig: Take one po daily prn pain  
 Class: Print We Performed the Following AMB POC DRUG SCREEN () [ [facility]] Follow-up Instructions Return in about 3 months (around 10/24/2017) for f/u DM/HTN/lipids. To-Do List   
 07/24/2017 Lab:  HEMOGLOBIN A1C W/O EAG   
  
 07/24/2017 Lab:  LIPID PANEL   
  
 07/24/2017 Lab:  METABOLIC PANEL, COMPREHENSIVE Patient Instructions 1) follow-up in 3 months or sooner if worsening symptoms. Introducing [facility]! Usha Yeh introduces [employer] patient portal. Now you can access parts of your medical record, email your doctor's office, and request medication refills online. 1. In your internet browser, go to [email or web address] 2. Click on the First Time User? Click Here link in the Sign In box. You will see the New Member Sign Up page. 3. Enter your [employer] Access Code exactly as it appears below. You will not need to use this code after youve completed the sign-up process. If you do not sign up before the expiration date, you must request a new code. · [employer] Access Code: [email or web address] Expires: 10/22/2017  9:54 AM 
 
4. Enter the last four digits of your Social Security Number (xxxx) and Date of Birth (mm/dd/yyyy) as indicated and click Submit. You will be taken to the next sign-up page. 5. Create a [employer] ID. This will be your [employer] login ID and cannot be changed, so think of one that is secure and easy to remember. 6. Create a [employer] password. You can change your password at any time. 7. Enter your Password Reset Question and Answer. This can be used at a later time if you forget your password. 8. Enter your e-mail address. You will receive e-mail notification when new information is available in [address] 9. Click Sign Up. You can now view and download portions of your medical record. 10. Click the Download Summary menu link to download a portable copy of your medical information. If you have questions, please visit the Frequently Asked Questions section of the [employer] website. Remember, [employer] is NOT to be used for urgent needs. For medical emergencies, dial 911. Now available from your iPhone and Android! Please provide this summary of care documentation to your next provider. Your primary care clinician is listed as Juan Rodríguez.  If you have any questions after today's visit, please call [phone].

## 2017-07-24 NOTE — PROGRESS NOTES
Chief Complaint   Patient presents with    Hypertension     3 month f/u    Diabetes     3 month f/u       HPI:     Jean Carlos Montoya is a 61 y.o.  male with history of type 2 DM and hypertension  here for the above complaint. He denies any chest pain, shortness of breath, abdominal pain, headaches or dizziness. He needs refill pain meds for his back and knee pain. Type 2 DM:sugars in the 100's.           Past Medical History:   Diagnosis Date    Advance directive discussed with patient 04/24/2017    Anxiety     Back injury 4/27/2007    Behavioral change     Chronic pain     CKD (chronic kidney disease) stage 2, GFR 60-89 ml/min 12/20/2013    Confusion     DDD (degenerative disc disease)     Depression     Diabetic eye exam ([facility] 09/09/2016    Dr. Freddy Waller ( bilateral cataracts)    Difficulty walking     DJD (degenerative joint disease)     Fatigue     Generalized stiffness     H/O colonoscopy 12/2/2013    Bx: showed 2 tubular adenomas Done by Dr. Rowena Mendoza    High cholesterol     Hypertension     Incoordination     Infected sebaceous cyst 5/4/2016    Insomnia     Joint pain     Lower extremity edema     Lymphedema     Muscle pain     Neuropathy (HCC)     Osteoarthritis     Other and unspecified hyperlipidemia     Painful sexual intercourse     Poor concentration     Snoring     Type II or unspecified type diabetes mellitus without mention of complication, not stated as uncontrolled     Weakness      Past Surgical History:   Procedure Laterality Date    HX COLONOSCOPY      HX ORTHOPAEDIC      left elbow surgery    HX ORTHOPAEDIC Left 1/19/16    left Total Knee replacement     Current Outpatient Prescriptions   Medication Sig    oxyCODONE-acetaminophen (PERCOCET) 7.5-325 mg per tablet Take one po daily prn pain    gabapentin (NEURONTIN) 300 mg capsule TAKE 2 CAPSULES BY MOUTH EVERY MORNING AND 2 CAPSULES EVERY EVENING    amLODIPine-benazepril (LOTREL) 5-20 mg per capsule TAKE ONE CAPSULE BY MOUTH ONCE DAILY    FREESTYLE LITE STRIPS strip Check fasting sugars daily before breakfast. BRAND MEDICALLY NECESSARY. FOR FREESTYLE LITE METER    Lancets misc Check fasting sugars daily before breakfast. BRAND MEDICALLY NECESSARY. FOR FREESTYLE LITE METER    furosemide (LASIX) 20 mg tablet Take one po daily prn swelling    atorvastatin (LIPITOR) 10 mg tablet TAKE 1 TABLET BY MOUTH DAILY    metoprolol tartrate (LOPRESSOR) 50 mg tablet TAKE ONE TABLET BY MOUTH TWICE DAILY    FREESTYLE LITE METER monitoring kit Check fasting sugars daily before breakfast. BRAND MEDICALLY NECESSARY    Aspirin, Buffered 81 mg tab Take 81 mg by mouth daily. No current facility-administered medications for this visit. Health Maintenance   Topic Date Due    Pneumococcal 19-64 Medium Risk (1 of 1 - PPSV23) 09/22/2017 (Originally 12/17/1979)    INFLUENZA AGE 9 TO ADULT  08/01/2017    EYE EXAM RETINAL OR DILATED Q1  09/09/2017    HEMOGLOBIN A1C Q6M  10/24/2017    MICROALBUMIN Q1  01/23/2018    FOOT EXAM Q1  01/25/2018    LIPID PANEL Q1  04/24/2018    COLONOSCOPY  12/02/2018    DTaP/Tdap/Td series (2 - Td) 04/24/2027    Hepatitis C Screening  Completed     Immunization History   Administered Date(s) Administered    Influenza Vaccine 02/06/2014, 09/09/2014    Influenza Vaccine (Quad) PF 11/23/2015, 01/23/2017     No LMP for male patient. Allergies and Intolerances:   No Known Allergies    Family History:   Family History   Problem Relation Age of Onset   [employer] Stroke Mother     Hypertension Mother     Seizures Mother     Diabetes Father     Heart Disease Father     Hypertension Father     Other Maternal Grandmother      tumor in abdomen       Social History:   He  reports that he has never smoked. He has never used smokeless tobacco.  He  reports that he does not drink alcohol.           ·     Objective:   Physical exam:   Visit Vitals    /88 (BP 1 Location: Left arm, BP Patient Position: Sitting)    Pulse (!) 56    Temp 97.1 °F (36.2 °C) (Oral)    Resp 18    Ht 5' 7\" (1.702 m)    Wt 227 lb 9.6 oz (103.2 kg)    SpO2 100%    BMI 35.65 kg/m2        Generally: Pleasant male in no acute distress  Cardiac Exam: regular, rate, and rhythm. Normal S1 and S2. No murmurs, gallops, or rubs  Pulmonary exam: Clear to auscultation bilaterally  Abdominal exam: Positive bowel sounds in all four quadrants, soft, nondistended, nontender  Extremities: 2+ dorsalis pedis pulses bilaterally. No pedal edema    bilaterally    LABS/Radiological Tests:  Lab Results   Component Value Date/Time    WBC 5.0 01/23/2017 10:37 AM    HGB 15.8 01/23/2017 10:37 AM    HCT 48.8 01/23/2017 10:37 AM    PLATELET 673 07/60/0884 10:37 AM     Lab Results   Component Value Date/Time    Sodium 140 04/24/2017 10:28 AM    Potassium 4.4 04/24/2017 10:28 AM    Chloride 105 04/24/2017 10:28 AM    CO2 32 04/24/2017 10:28 AM    Glucose 116 04/24/2017 10:28 AM    BUN 19 04/24/2017 10:28 AM    Creatinine 1.22 04/24/2017 10:28 AM     Lab Results   Component Value Date/Time    Cholesterol, total 148 04/24/2017 10:28 AM    HDL Cholesterol 45 04/24/2017 10:28 AM    LDL, calculated 84.8 04/24/2017 10:28 AM    Triglyceride 91 04/24/2017 10:28 AM     No results found for: GPT    Component      Latest Ref Rng & Units 4/24/2017          10:28 AM   Hemoglobin A1c, (calculated)      4.2 - 5.6 % 7.0 (H)   Est. average glucose      mg/dL 154     All lab results and radiological studies were discussed and reviewed with the patient.     Component      Latest Ref Rng & Units 7/24/2017          10:30 AM   ALCOHOL UR POC          AMPHETAMINES UR POC       Negative   CARISOPRODOL UR POC          COCAINE UR POC       Negative   FENTANYL UR POC          MDMA/ECSTASY UR POC       Negative   METHADONE UR POC       Negative   METHAMPHETAMINE UR POC       Negative   METHYLPHENIDATE UR POC          OPIATES UR POC       Negative OXYCODONE UR POC       Presumptive Positive   PHENCYCLIDINE UR POC          PROPOXYPHENE UR POC          TRAMADOL UR POC          TRICYCLICS UR POC          BARBITURATES UR POC       Negative   BENZODIAZEPINES UR POC       Negative   CANNABINOIDS UR POC       Negative   BUPRENORPHINE UR POC          Pt notified of results at [address] today, 7/24/17. ASSESSMENT/PLAN:    1. Diabetes mellitus, stable ([address]): we will see what the labs show. Continue diet and exercise.   -     HEMOGLOBIN A1C W/O EAG; Future    2. Benign hypertension without CHF: stable. Continue the lotrel, lopressor, diet and exercise. 3. Dyslipidemia: we will see what the labs show. Continue diet, exercise and lipitor.   -     LIPID PANEL; Future  -     METABOLIC PANEL, COMPREHENSIVE; Future    4. Encounter for chronic pain management  -     AMB POC DRUG SCREEN ()  -     oxyCODONE-acetaminophen (PERCOCET) 7.5-325 mg per tablet; Take one po daily prn pain    5. Medication refill  -     oxyCODONE-acetaminophen (PERCOCET) 7.5-325 mg per tablet; Take one po daily prn pain      6. Venetta Hashimoto RTC today to discuss his osteoarthritis that is affecting his back and knee. Significant changes since last visit: none. He is  able to do his normal daily activities. He reports the following adverse side effects: none. Aberrant behaviors: None. Urine Drug Screen: due - order placed. Pain agreement on file:on file. Signed 4/2017      reviewed: yes. 7.   Requested Prescriptions     Signed Prescriptions Disp Refills    oxyCODONE-acetaminophen (PERCOCET) 7.5-325 mg per tablet 30 Tab 0     Sig: Take one po daily prn pain       8. Patient verbalized understanding and agreement with the plan. 9. Patient was given an after-visit summary. 10.   Follow-up Disposition:  Return in about 3 months (around 10/24/2017) for f/u DM/HTN/lipids. or sooner if worsening symptoms.                 Lora Lord MD

## 2017-07-24 NOTE — PROGRESS NOTES
ROOM # 1    Dawit Jenkins presents today for   Chief Complaint   Patient presents with    Hypertension     3 month f/u    Diabetes     3 month f/u       Dawit Jenkins preferred language for health care discussion is english/other. Is someone accompanying this pt? no    Is the patient using any DME equipment during OV? no    Depression Screening:  PHQ over the last two weeks 7/24/2017 4/24/2017 9/22/2016 3/23/2016 3/23/2016 4/15/2015 3/20/2014   Little interest or pleasure in doing things Not at all Not at all Not at all Not at all Not at all Several days Several days   Feeling down, depressed or hopeless Not at all Not at all Not at all Not at all Not at all Not at all Several days   Total Score PHQ 2 0 0 0 0 0 1 2       Learning Assessment:  Learning Assessment 1/14/2015 12/20/2013   PRIMARY LEARNER Patient Patient   HIGHEST LEVEL OF EDUCATION - PRIMARY LEARNER  DID NOT GRADUATE HIGH SCHOOL DID NOT GRADUATE [address] LEARNER NONE NONE   PRIMARY [address]    NEED - No   LEARNER PREFERENCE PRIMARY DEMONSTRATION DEMONSTRATION   LEARNING SPECIAL TOPICS - no   ANSWERED BY patient patient   RELATIONSHIP SELF SELF       Abuse Screening:  No flowsheet data found. Fall Risk  No flowsheet data found. Health Maintenance reviewed and discussed per provider. Yes      Advance Directive:  1. Do you have an advance directive in place? Patient Reply: no    2. If not, would you like material regarding how to put one in place? Patient Reply: no    Coordination of Care:  1. Have you been to the ER, urgent care clinic since your last visit? Hospitalized since your last visit? no    2. Have you seen or consulted any other health care providers outside of the [address] [place] [address] since your last visit? Include any pap smears or colon screening.  no

## 2017-08-21 ENCOUNTER — TELEPHONE (OUTPATIENT)
Dept: INTERNAL MEDICINE CLINIC | Age: 57
End: 2017-08-21

## 2017-08-21 NOTE — TELEPHONE ENCOUNTER
Spoke with patient, confirmed name and . Advised patient of below resonse, per Dr. Leonard Thomson. Patient verbalized understanding, stated that he needs both records and a letter. I advised that he would have to wait till Monday, pt verbalized agreement.      Be advised

## 2017-08-21 NOTE — TELEPHONE ENCOUNTER
Patient requesting letter detailing medical diagnosis, prescriptions and procedures undergone while in care at . [address]. Patient is applying for [employer] benefits. Patient would like call when ready for pickup.

## 2017-08-21 NOTE — TELEPHONE ENCOUNTER
Does he need actual medical records? His PCP is not available this week. If letter is required rather than medical record request, he will need to wait until next week to have this addressed.

## 2017-08-25 NOTE — TELEPHONE ENCOUNTER
2 pt. Identifiers confirmed. Pt. Was notified of below. He was advised that he should come in and fill out a records release form. Pt. States he will do that and will call Monday when Dr. Luis Eduardo Donald is in office to be sure he does not also need a letter. No other questions/conerns at this time.

## 2017-08-28 ENCOUNTER — TELEPHONE (OUTPATIENT)
Dept: INTERNAL MEDICINE CLINIC | Age: 57
End: 2017-08-28

## 2017-08-28 NOTE — LETTER
8/28/2017 12:39 PM 
 
[facility] [phone] Dear Mr. Wade: Mr. Wilda Johnson has been under my care since 2013. If you require further information for his disability claim, please refer to patient's medical records. If you have any question, please give us a call back at [phone]. Sincerely, Christina Velazquez M.D.

## 2017-08-28 NOTE — TELEPHONE ENCOUNTER
Patient presented at office and picked up records request and will return to office. Patient still requests letter detailing medical care from [facility].

## 2017-08-30 NOTE — TELEPHONE ENCOUNTER
Spoke with patient, verified with 2 identifiers. Advised that letter can be picked up at . Patient verbalized understanding.

## 2017-08-31 DIAGNOSIS — Z76.0 MEDICATION REFILL: ICD-10-CM

## 2017-08-31 RX ORDER — OXYCODONE AND ACETAMINOPHEN 7.5; 325 MG/1; MG/1
TABLET ORAL
Qty: 30 TAB | Refills: 0 | Status: SHIPPED | OUTPATIENT
Start: 2017-08-31 | End: 2017-09-28 | Stop reason: SDUPTHER

## 2017-08-31 NOTE — TELEPHONE ENCOUNTER
printed to prescriber upstairs printer    Last UDS date: 7/24/17  Pain contract signed: 4/24/17  Last OV: 7/24/17  Next Appt: 10/24/17

## 2017-08-31 NOTE — TELEPHONE ENCOUNTER
Checked  and no aberrancies seen. Printed rx for:    Requested Prescriptions     Signed Prescriptions Disp Refills    oxyCODONE-acetaminophen (PERCOCET) 7.5-325 mg per tablet 30 Tab 0     Sig: Take one po daily prn pain     Authorizing Provider: Cyril Schumacher     Please let pt know that this is ready for .

## 2017-09-28 DIAGNOSIS — Z76.0 MEDICATION REFILL: ICD-10-CM

## 2017-09-29 NOTE — TELEPHONE ENCOUNTER
printed and placed in PCP medication refill review folder.   PRINTED to printer 7     Last UDS date: 7/24/2017  Pain contract signed: 4/24/17  Last office visit: 7/24/17  Next Appt: 10/24/17

## 2017-10-02 ENCOUNTER — HOSPITAL ENCOUNTER (OUTPATIENT)
Dept: LAB | Age: 57
Discharge: HOME | End: 2017-10-02
Payer: MEDICARE

## 2017-10-02 ENCOUNTER — LAB ONLY (OUTPATIENT)
Dept: INTERNAL MEDICINE CLINIC | Age: 57
End: 2017-10-02

## 2017-10-02 DIAGNOSIS — G89.29 ENCOUNTER FOR CHRONIC PAIN MANAGEMENT: Primary | ICD-10-CM

## 2017-10-02 PROCEDURE — G0480 DRUG TEST DEF 1-7 CLASSES: HCPCS | Performed by: INTERNAL MEDICINE

## 2017-10-02 RX ORDER — OXYCODONE AND ACETAMINOPHEN 7.5; 325 MG/1; MG/1
TABLET ORAL
Qty: 30 TAB | Refills: 0 | Status: SHIPPED | OUTPATIENT
Start: 2017-10-02 | End: 2017-10-31 | Stop reason: SDUPTHER

## 2017-10-02 NOTE — TELEPHONE ENCOUNTER
Called pt 2 pt identifiers confirmed informed pt of below and requested that when he comes in to  his prescription that he give a urine sample pt stated understanding no other questions or concerns noted at this time.

## 2017-10-02 NOTE — TELEPHONE ENCOUNTER
Checked  and no aberrancies seen. Let pt know that this is ready for . Printed rx for:    Requested Prescriptions     Signed Prescriptions Disp Refills    oxyCODONE-acetaminophen (PERCOCET) 7.5-325 mg per tablet 30 Tab 0     Sig: Take one po daily prn pain     Authorizing Provider: Dao Webb     He is within 90 day window for UDS, but will need to checked again on 10/24/17. He needs to come this week to get UDS because it takes about 7-10 days to get the send out results of the [facility].

## 2017-10-13 LAB — TOXASSURE SELECT 13: NORMAL

## 2017-10-24 ENCOUNTER — OFFICE VISIT (OUTPATIENT)
Dept: INTERNAL MEDICINE CLINIC | Age: 57
End: 2017-10-24

## 2017-10-24 VITALS
HEART RATE: 66 BPM | WEIGHT: 232.4 LBS | BODY MASS INDEX: 36.47 KG/M2 | SYSTOLIC BLOOD PRESSURE: 132 MMHG | HEIGHT: 67 IN | OXYGEN SATURATION: 97 % | RESPIRATION RATE: 18 BRPM | TEMPERATURE: 97.2 F | DIASTOLIC BLOOD PRESSURE: 80 MMHG

## 2017-10-24 DIAGNOSIS — E78.5 DYSLIPIDEMIA: ICD-10-CM

## 2017-10-24 DIAGNOSIS — Z23 ENCOUNTER FOR IMMUNIZATION: ICD-10-CM

## 2017-10-24 DIAGNOSIS — R06.83 SNORING: ICD-10-CM

## 2017-10-24 DIAGNOSIS — E11.9 DIABETES MELLITUS, STABLE (HCC): Primary | ICD-10-CM

## 2017-10-24 NOTE — MR AVS SNAPSHOT
Visit Information Date & Time Provider Department Dept. Phone Encounter #  
 10/24/2017  9:45 AM John Mendoza MD [facility] [phone] [id number] Follow-up Instructions Return in about 4 months (around 2/24/2018) for f/u DM/HTN/lipids. Upcoming Health Maintenance Date Due  
 EYE EXAM RETINAL OR DILATED Q1 9/9/2017 MICROALBUMIN Q1 1/23/2018 HEMOGLOBIN A1C Q6M 1/24/2018 FOOT EXAM Q1 1/25/2018 LIPID PANEL Q1 7/24/2018 COLONOSCOPY 12/2/2018 DTaP/Tdap/Td series (2 - Td) 4/24/2027 Allergies as of 10/24/2017  Review Complete On: 10/24/2017 By: Christiano Banks MD  
 No Known Allergies Current Immunizations  Reviewed on 10/24/2017 Name Date Influenza Vaccine 9/9/2014  9:28 AM, 2/6/2014  1:10 AM  
 Influenza Vaccine (Quad) PF  Incomplete, 1/23/2017, 11/23/2015 Pneumococcal Polysaccharide (PPSV-23)  Incomplete Reviewed by Christiano Banks MD on 10/24/2017 at  9:55 AM  
You Were Diagnosed With   
  
 Codes Comments Encounter for immunization    -  Primary ICD-10-CM: B95 ICD-9-CM: V03.89 [facility]     ICD-10-CM: E11.9 ICD-9-CM: 250.00 Dyslipidemia     ICD-10-CM: E78.5 ICD-9-CM: 272.4 Snoring     ICD-10-CM: R06.83 
ICD-9-CM: 786.09 Vitals BP Pulse Temp Resp Height(growth percentile) Weight(growth percentile) 132/80 (BP 1 Location: Left arm, BP Patient Position: Sitting) 66 97.2 °F (36.2 °C) (Oral) 18 5' 7\" (1.702 m) 232 lb 6.4 oz (105.4 kg) SpO2 BMI Smoking Status 97% 36.4 kg/m2 Never Smoker Vitals History BMI and BSA Data Body Mass Index Body Surface Area  
 36.4 kg/m 2 2.23 m 2 Preferred Pharmacy Pharmacy Name Phone [facility] [phone] Your Updated Medication List  
  
   
This list is accurate as of: 10/24/17 10:02 AM.  Always use your most recent med list. amLODIPine-benazepril 5-20 mg per capsule Commonly known as:  LOTREL  
TAKE ONE CAPSULE BY MOUTH ONCE DAILY  
  
 aspirin, buffered 81 mg Tab Take 81 mg by mouth daily. atorvastatin 10 mg tablet Commonly known as:  LIPITOR  
TAKE 1 TABLET BY MOUTH DAILY FREESTYLE LITE METER monitoring kit Generic drug:  Blood-Glucose Meter Check fasting sugars daily before breakfast. BRAND MEDICALLY NECESSARY FREESTYLE LITE STRIPS strip Generic drug:  glucose blood VI test strips Check fasting sugars daily before breakfast. BRAND MEDICALLY NECESSARY. FOR FREESTYLE LITE METER  
  
 furosemide 20 mg tablet Commonly known as:  LASIX Take one po daily prn swelling  
  
 gabapentin 300 mg capsule Commonly known as:  NEURONTIN  
TAKE 2 CAPSULES BY MOUTH EVERY MORNING AND 2 CAPSULES EVERY EVENING Lancets Misc Check fasting sugars daily before breakfast. BRAND MEDICALLY NECESSARY. FOR FREESTYLE LITE METER  
  
 metoprolol tartrate 50 mg tablet Commonly known as:  LOPRESSOR  
TAKE ONE TABLET BY MOUTH TWICE DAILY  
  
 oxyCODONE-acetaminophen 7.5-325 mg per tablet Commonly known as:  PERCOCET Take one po daily prn pain We Performed the Following ADMIN INFLUENZA VIRUS VAC [ [facility]] ADMIN PNEUMOCOCCAL VACCINE [ [facility]] INFLUENZA VIRUS VAC QUAD,SPLIT,PRESV FREE SYRINGE IM [id number] CPT(R)] PNEUMOCOCCAL POLYSACCHARIDE VACCINE, 23-VALENT, ADULT OR IMMUNOSUPPRESSED PT DOSE, [16776 CPT(R)] REFERRAL TO SLEEP STUDIES [REF99 Custom] Comments:  
 Please evaluate for snoring to r/o MESERET in 1 week. Follow-up Instructions Return in about 4 months (around 2/24/2018) for f/u DM/HTN/lipids. To-Do List   
 10/24/2017 Lab:  LIPID PANEL   
  
 10/24/2017 Lab:  METABOLIC PANEL, COMPREHENSIVE Referral Information  Referral ID Referred By Referred To  
  
 [id number] Kevin Cortez MD   
 [address] [facility] [address] Erick Saenz Phone: [phone] Fax: [phone] Visits Status Start Date End Date 1 New Request 10/24/17 10/24/18 If your referral has a status of pending review or denied, additional information will be sent to support the outcome of this decision. Patient Instructions 1) follow-up in 4 months or sooner if worsening symptoms. Introducing [facility]! Dakota Beck introduces [employer] patient portal. Now you can access parts of your medical record, email your doctor's office, and request medication refills online. 1. In your internet browser, go to [email or web address] 2. Click on the First Time User? Click Here link in the Sign In box. You will see the New Member Sign Up page. 3. Enter your [employer] Access Code exactly as it appears below. You will not need to use this code after youve completed the sign-up process. If you do not sign up before the expiration date, you must request a new code. · [employer] Access Code: [email or web address] Expires: 1/22/2018  9:56 AM 
 
4. Enter the last four digits of your Social Security Number (xxxx) and Date of Birth (mm/dd/yyyy) as indicated and click Submit. You will be taken to the next sign-up page. 5. Create a [employer] ID. This will be your [employer] login ID and cannot be changed, so think of one that is secure and easy to remember. 6. Create a [employer] password. You can change your password at any time. 7. Enter your Password Reset Question and Answer. This can be used at a later time if you forget your password. 8. Enter your e-mail address. You will receive e-mail notification when new information is available in [address] 9. Click Sign Up. You can now view and download portions of your medical record. 10. Click the Download Summary menu link to download a portable copy of your medical information. If you have questions, please visit the Frequently Asked Questions section of the [employer] website. Remember, [employer] is NOT to be used for urgent needs. For medical emergencies, dial 911. Now available from your iPhone and Android! Please provide this summary of care documentation to your next provider. Your primary care clinician is listed as Juan Rodríguez. If you have any questions after today's visit, please call [phone].

## 2017-10-24 NOTE — PROGRESS NOTES
Chief Complaint   Patient presents with    Hypertension     3 month fu    Diabetes     3 month f/u       HPI:     Davian Keith is a 61 y.o.  male with history of  Anxiety, type 2 DM, and hypertension  here for the above complaint. He denies any chest pain, headaches or dizziness. He has some shortness of breath and little wheezing, but it sounds like due to being out of shape. He snores a lot and denies any apenic spells. Will refer for sleep study. Type 2 DM: sugar range: 100-120.              Past Medical History:   Diagnosis Date    Advance directive discussed with patient 04/24/2017    Anxiety     Back injury 4/27/2007    Behavioral change     Chronic pain     CKD (chronic kidney disease) stage 2, GFR 60-89 ml/min 12/20/2013    Confusion     DDD (degenerative disc disease)     Depression     Diabetic eye exam ([facility] 09/09/2016    Dr. Cristian Calhoun ( bilateral cataracts)    Difficulty walking     DJD (degenerative joint disease)     Fatigue     Generalized stiffness     H/O colonoscopy 12/2/2013    Bx: showed 2 tubular adenomas Done by Dr. Coni Pelaez    High cholesterol     Hypertension     Incoordination     Infected sebaceous cyst 5/4/2016    Insomnia     Joint pain     Lower extremity edema     Lymphedema     Muscle pain     Neuropathy     Osteoarthritis     Other and unspecified hyperlipidemia     Painful sexual intercourse     Poor concentration     Snoring     Type II or unspecified type diabetes mellitus without mention of complication, not stated as uncontrolled     Weakness      Past Surgical History:   Procedure Laterality Date    HX COLONOSCOPY      HX ORTHOPAEDIC      left elbow surgery    HX ORTHOPAEDIC Left 1/19/16    left Total Knee replacement     Current Outpatient Prescriptions   Medication Sig    oxyCODONE-acetaminophen (PERCOCET) 7.5-325 mg per tablet Take one po daily prn pain    gabapentin (NEURONTIN) 300 mg capsule TAKE 2 CAPSULES BY MOUTH EVERY MORNING AND 2 CAPSULES EVERY EVENING    amLODIPine-benazepril (LOTREL) 5-20 mg per capsule TAKE ONE CAPSULE BY MOUTH ONCE DAILY    FREESTYLE LITE STRIPS strip Check fasting sugars daily before breakfast. BRAND MEDICALLY NECESSARY. FOR FREESTYLE LITE METER    Lancets misc Check fasting sugars daily before breakfast. BRAND MEDICALLY NECESSARY. FOR FREESTYLE LITE METER    furosemide (LASIX) 20 mg tablet Take one po daily prn swelling    atorvastatin (LIPITOR) 10 mg tablet TAKE 1 TABLET BY MOUTH DAILY    metoprolol tartrate (LOPRESSOR) 50 mg tablet TAKE ONE TABLET BY MOUTH TWICE DAILY    FREESTYLE LITE METER monitoring kit Check fasting sugars daily before breakfast. BRAND MEDICALLY NECESSARY    Aspirin, Buffered 81 mg tab Take 81 mg by mouth daily. No current facility-administered medications for this visit. Health Maintenance   Topic Date Due    EYE EXAM RETINAL OR DILATED Q1  09/09/2017    MICROALBUMIN Q1  01/23/2018    HEMOGLOBIN A1C Q6M  01/24/2018    FOOT EXAM Q1  01/25/2018    LIPID PANEL Q1  07/24/2018    COLONOSCOPY  12/02/2018    DTaP/Tdap/Td series (2 - Td) 04/24/2027    Hepatitis C Screening  Completed    Pneumococcal 19-64 Medium Risk  Completed    INFLUENZA AGE 9 TO ADULT  Completed     Immunization History   Administered Date(s) Administered    Influenza Vaccine 02/06/2014, 09/09/2014    Influenza Vaccine (Quad) PF 11/23/2015, 01/23/2017, 10/24/2017    Pneumococcal Polysaccharide (PPSV-23) 10/24/2017     No LMP for male patient. Allergies and Intolerances:   No Known Allergies    Family History:   Family History   Problem Relation Age of Onset   [facility] Stroke Mother     Hypertension Mother     Seizures Mother     Diabetes Father     Heart Disease Father     Hypertension Father     Other Maternal Grandmother      tumor in abdomen       Social History:   He  reports that he has never smoked.  He has never used smokeless tobacco.  He  reports that he does not drink alcohol. Objective:   Physical exam:   Visit Vitals    /80 (BP 1 Location: Left arm, BP Patient Position: Sitting)    Pulse 66    Temp 97.2 °F (36.2 °C) (Oral)    Resp 18    Ht 5' 7\" (1.702 m)    Wt 232 lb 6.4 oz (105.4 kg)    SpO2 97%    BMI 36.4 kg/m2        Generally: Pleasant male in no acute distress  Cardiac Exam: regular, rate, and rhythm. Normal S1 and S2. No murmurs, gallops, or rubs  Pulmonary exam: Clear to auscultation bilaterally  Abdominal exam: Positive bowel sounds in all four quadrants, soft, nondistended, nontender  Extremities: 2+ dorsalis pedis pulses bilaterally. No pedal edema    bilaterally    LABS/Radiological Tests:  Component      Latest Ref Rng & Units 10/2/2017 7/24/2017 7/24/2017 7/24/2017          10:32 AM 11:03 AM 11:03 AM 11:03 AM   Sodium      136 - 145 mmol/L  140     Potassium      3.5 - 5.5 mmol/L  4.0     Chloride      100 - 108 mmol/L  103     CO2      21 - 32 mmol/L  30     Anion gap      3.0 - 18 mmol/L  7     Glucose      74 - 99 mg/dL  127 (H)     BUN      7.0 - 18 MG/DL  18     Creatinine      0.6 - 1.3 MG/DL  1.18     BUN/Creatinine ratio      12 - 20    15     GFR est AA      >60 ml/min/1.73m2  >60     GFR est non-AA      >60 ml/min/1.73m2  >60     Calcium      8.5 - 10.1 MG/DL  8.9     Bilirubin, total      0.2 - 1.0 MG/DL  0.6     ALT (SGPT)      16 - 61 U/L  45     AST      15 - 37 U/L  19     Alk.  phosphatase      45 - 117 U/L  94     Protein, total      6.4 - 8.2 g/dL  6.9     Albumin      3.4 - 5.0 g/dL  3.8     Globulin      2.0 - 4.0 g/dL  3.1     A-G Ratio      0.8 - 1.7    1.2     Cholesterol, total      <200 MG/DL   129    Triglyceride      <150 MG/DL   71    HDL Cholesterol      40 - 60 MG/DL   43    LDL, calculated      0 - 100 MG/DL   71.8    VLDL, calculated      MG/DL   14.2    CHOL/HDL Ratio      0 - 5.0     3.0    Hemoglobin A1c, (calculated)      4.2 - 5.6 %    6.7 (H)   TOXASSURE SELECT 13 (MW)   Order: [id number]   Collected:  10/2/2017 10:32 AM   Notes Recorded by Martha Dickson MD on 10/7/2017 at 3:57 PM  Result letter generated on 10/7/17 and will be mailed to pt.      3wk ago     [employer] results FINAL   Comments: (NOTE)   ====================================================================   TOXASSURE SELECT 13 (MW)   ====================================================================   Test                             Result       Flag       Units   Drug Present    Oxycodone                      494                     ng/mg creat    Noroxycodone                   107                     ng/mg creat     Sources of oxycodone include scheduled prescription medications.     Noroxycodone is an expected metabolite of oxycodone.   ====================================================================   Test                      Result    Flag   Units      Ref Range    Creatinine              227              mg/dL      >=20   ====================================================================   Declared Medications:   Medication list was not provided.   ====================================================================   For clinical consultation, please call [phone].   ====================================================================   Performed At: [facility]   [address] [place], [place] [id number]   Roma Schaumann MD ZH:[id number]                Previous labs    ASSESSMENT/PLAN:    1. Diabetes mellitus, stable ([facility]): we will see what the labs are. Continue diet and exercise. 2. Dyslipidemia: we will see what the labs show. Continue diet, exercise and lipitor.   -     METABOLIC PANEL, COMPREHENSIVE; Future  -     LIPID PANEL; Future    3. Snoring  -     REFERRAL TO SLEEP STUDIES    4.  Encounter for immunization  -     Influenza virus vaccine (QUADRIVALENT PRES FREE SYRINGE) IM ([id number])  -     Administration fee () for Medicare insured patients  -     Pneumococcal Polysaccharide vaccine, 23-Valent, Adult or Immunocompromised  -     ADMIN PNEUMOCOCCAL VACCINE  Medicare Injection Admin Charge    5. Hypertension: stable. Continue the lotrel and lopressor, diet and exercise. 6. Patient verbalized understanding and agreement with the plan. 7. Patient was given an after-visit summary. 8.   Follow-up Disposition:  Return in about 4 months (around 2/24/2018) for f/u DM/HTN/lipids. or sooner if worsening symptoms.                 Ree Benavides MD

## 2017-10-24 NOTE — PROGRESS NOTES
ROOM # 1    Linda Cho presents today for   Chief Complaint   Patient presents with    Hypertension     3 month fu    Diabetes     3 month f/u       Linda Cho preferred language for health care discussion is english/other. Is someone accompanying this pt? no    Is the patient using any DME equipment during OV? no    Depression Screening:  PHQ over the last two weeks 10/24/2017 7/24/2017 4/24/2017 9/22/2016 3/23/2016 3/23/2016 4/15/2015   Little interest or pleasure in doing things Not at all Not at all Not at all Not at all Not at all Not at all Several days   Feeling down, depressed or hopeless Not at all Not at all Not at all Not at all Not at all Not at all Not at all   Total Score PHQ 2 0 0 0 0 0 0 1       Learning Assessment:  Learning Assessment 1/14/2015 12/20/2013   PRIMARY LEARNER Patient Patient   HIGHEST LEVEL OF EDUCATION - PRIMARY LEARNER  DID NOT GRADUATE HIGH SCHOOL DID NOT GRADUATE [address] LEARNER NONE NONE   PRIMARY [address]    NEED - No   LEARNER PREFERENCE PRIMARY DEMONSTRATION DEMONSTRATION   LEARNING SPECIAL TOPICS - no   ANSWERED BY patient patient   RELATIONSHIP SELF SELF       Abuse Screening:  Abuse Screening Questionnaire 10/24/2017   Do you ever feel afraid of your partner? N   Are you in a relationship with someone who physically or mentally threatens you? N   Is it safe for you to go home? Y       Fall Risk  No flowsheet data found. Health Maintenance reviewed and discussed per provider. Yes    Linda Cho is due for influenza vax, eye exam (record request, Dr. Kishan Juan), pneumonia vax . Please order/place referral if appropriate. Advance Directive:  1. Do you have an advance directive in place? Patient Reply: no    2. If not, would you like material regarding how to put one in place? Patient Reply: no    Coordination of Care:  1. Have you been to the ER, urgent care clinic since your last visit?   Hospitalized since your last visit? no    2. Have you seen or consulted any other health care providers outside of the [address] [place] [address] since your last visit? Include any pap smears or colon screening. no    Immunizations administered 10/24/2017 by Lakeshia Abreu LPN with pt's consent. Patient tolerated procedure well. Pt. Observed for 15mins. No reactions noted/reported.     Immunization History   Administered Date(s) Administered    Influenza Vaccine 02/06/2014, 09/09/2014    Influenza Vaccine (Quad) PF 11/23/2015, 01/23/2017, 10/24/2017    Pneumococcal Polysaccharide (PPSV-23) 10/24/2017

## 2017-10-31 DIAGNOSIS — Z76.0 MEDICATION REFILL: ICD-10-CM

## 2017-10-31 NOTE — TELEPHONE ENCOUNTER
Patient request, last OV 10/24/17, next scheduled 2/22/18    Requested Prescriptions     Pending Prescriptions Disp Refills    oxyCODONE-acetaminophen (PERCOCET) 7.5-325 mg per tablet 30 Tab 0     Sig: Take one po daily prn pain

## 2017-11-01 RX ORDER — OXYCODONE AND ACETAMINOPHEN 7.5; 325 MG/1; MG/1
TABLET ORAL
Qty: 30 TAB | Refills: 0 | Status: SHIPPED | OUTPATIENT
Start: 2017-11-01 | End: 2017-12-04 | Stop reason: SDUPTHER

## 2017-11-01 NOTE — TELEPHONE ENCOUNTER
Printed rx for:    Requested Prescriptions     Signed Prescriptions Disp Refills    oxyCODONE-acetaminophen (PERCOCET) 7.5-325 mg per tablet 30 Tab 0     Sig: Take one po daily prn pain     Authorizing Provider: Flavio Pack     Please let pt know that this is ready for . Checked  and no aberrancies seen.

## 2017-11-01 NOTE — TELEPHONE ENCOUNTER
Processing     printed and placed in PCP medication refill review folder.      Last UDS date: 10/2/2017  Pain contract signed: 4/24/2017  Last office visit: 10/24/2017  Next Appt: 2/22/2018     sent to printer #[id number]

## 2017-12-04 DIAGNOSIS — Z76.0 MEDICATION REFILL: ICD-10-CM

## 2017-12-04 RX ORDER — OXYCODONE AND ACETAMINOPHEN 7.5; 325 MG/1; MG/1
TABLET ORAL
Qty: 30 TAB | Refills: 0 | Status: SHIPPED | OUTPATIENT
Start: 2017-12-04 | End: 2018-01-23 | Stop reason: SDUPTHER

## 2017-12-04 NOTE — TELEPHONE ENCOUNTER
printed and placed in PCP medication refill review folder.       Last UDS date: 10/2/2017  Pain contract signed: 4/24/2017  Last office visit: 10/24/2017  Next Appt: 2/22/2018

## 2017-12-04 NOTE — TELEPHONE ENCOUNTER
Checked  and no aberrancies seen. Printed rx for:    Requested Prescriptions     Signed Prescriptions Disp Refills    oxyCODONE-acetaminophen (PERCOCET) 7.5-325 mg per tablet 30 Tab 0     Sig: Take one po daily prn pain     Authorizing Provider: Julissa Raza     He will need to get UDS 2 weeks prior to refill in 1/2018. Please let pt know that this is ready for .

## 2018-01-23 DIAGNOSIS — Z76.0 MEDICATION REFILL: ICD-10-CM

## 2018-01-23 DIAGNOSIS — G89.29 OTHER CHRONIC PAIN: Primary | ICD-10-CM

## 2018-01-23 RX ORDER — OXYCODONE AND ACETAMINOPHEN 7.5; 325 MG/1; MG/1
TABLET ORAL
Qty: 30 TAB | Refills: 0 | Status: SHIPPED | OUTPATIENT
Start: 2018-01-23 | End: 2018-01-23 | Stop reason: CLARIF

## 2018-01-23 RX ORDER — OXYCODONE AND ACETAMINOPHEN 7.5; 325 MG/1; MG/1
TABLET ORAL
Qty: 30 TAB | Refills: 0 | Status: SHIPPED | OUTPATIENT
Start: 2018-01-23 | End: 2018-02-22 | Stop reason: SDUPTHER

## 2018-01-23 NOTE — TELEPHONE ENCOUNTER
Will do UDS at 2/22/18 OV. Checked  and no aberrancies seen. Printed rx for:    Requested Prescriptions     Signed Prescriptions Disp Refills    oxyCODONE-acetaminophen (PERCOCET) 7.5-325 mg per tablet 30 Tab 0     Sig: Take one po daily prn pain     Authorizing Provider: Asif Akhtar     Please let pt know that this is ready for .

## 2018-01-23 NOTE — TELEPHONE ENCOUNTER
printed and placed in PCP medication refill review folder.      Last UDS date: 10/02/2017  Pain contract signed: 04/24/2017  Next Appt: 02/22/2018  Last appt: 10/24/2017

## 2018-02-14 ENCOUNTER — OFFICE VISIT (OUTPATIENT)
Dept: SURGERY | Age: 58
End: 2018-02-14

## 2018-02-14 ENCOUNTER — HOME HEALTH ADMISSION (OUTPATIENT)
Dept: HOME HEALTH SERVICES | Facility: HOME HEALTH | Age: 58
End: 2018-02-14

## 2018-02-14 ENCOUNTER — HOSPITAL ENCOUNTER (OUTPATIENT)
Dept: LAB | Age: 58
Discharge: HOME | End: 2018-02-14
Payer: MEDICARE

## 2018-02-14 VITALS — DIASTOLIC BLOOD PRESSURE: 88 MMHG | SYSTOLIC BLOOD PRESSURE: 138 MMHG | TEMPERATURE: 98.3 F | RESPIRATION RATE: 18 BRPM

## 2018-02-14 DIAGNOSIS — L02.91 ABSCESS: Primary | ICD-10-CM

## 2018-02-14 DIAGNOSIS — L02.212 BACK ABSCESS: ICD-10-CM

## 2018-02-14 DIAGNOSIS — L02.91 ABSCESS: ICD-10-CM

## 2018-02-14 PROCEDURE — 87070 CULTURE OTHR SPECIMN AEROBIC: CPT

## 2018-02-14 RX ORDER — CEPHALEXIN 500 MG/1
500 CAPSULE ORAL 3 TIMES DAILY
Qty: 30 CAP | Refills: 0 | Status: SHIPPED | OUTPATIENT
Start: 2018-02-14 | End: 2018-05-14 | Stop reason: SDUPTHER

## 2018-02-14 NOTE — MR AVS SNAPSHOT
[address] [facility] [address] 
[phone] Patient: Shalonda Shelby MRN: [id number] :1960 Visit Information Date & Time Provider Department Dept. Phone Encounter #  
 2018 10:00 AM MD [facility] [phone] Your Appointments 2018 11:00 AM  
Follow Up with Laura Dempsey MD  
[address]) Appt Note: 1 week fu  
 [address] [facility] [address] [facility] [address]  
[phone]  
  
   
 [address] [address]  
  
    
 2018  9:15 AM  
Office Visit with Kaiden Lyon MD  
[address]) Appt Note: 4 mo for f/u DM/HTN/Lipids [address]  
  
   
 [address] [facility] Upcoming Health Maintenance Date Due  
 EYE EXAM RETINAL OR DILATED Q1 2017 MICROALBUMIN Q1 2018 HEMOGLOBIN A1C Q6M 2018 FOOT EXAM Q1 2018 LIPID PANEL Q1 2018 COLONOSCOPY 2018 DTaP/Tdap/Td series (2 - Td) 2027 Allergies as of 2018  Review Complete On: 2018 By: Isabel Paez LPN No Known Allergies Current Immunizations  Reviewed on 10/24/2017 Name Date Influenza Vaccine 2014  9:28 AM, 2014  1:10 AM  
 Influenza Vaccine (Quad) PF 10/24/2017, 2017, 2015 Pneumococcal Polysaccharide (PPSV-23) 10/24/2017 Not reviewed this visit Vitals BP Temp Resp Smoking Status 138/88 98.3 °F (36.8 °C) (Oral) 18 Never Smoker Preferred Pharmacy Pharmacy Name Phone [id number], [address] [place] [address] [phone] Your Updated Medication List  
  
   
This list is accurate as of: 18 10:41 AM.  Always use your most recent med list. amLODIPine-benazepril 5-20 mg per capsule Commonly known as:  LOTREL  
TAKE ONE CAPSULE BY MOUTH ONCE DAILY  
  
 aspirin, buffered 81 mg Tab Take 81 mg by mouth daily. atorvastatin 10 mg tablet Commonly known as:  LIPITOR  
TAKE 1 TABLET BY MOUTH DAILY  
  
 cephALEXin 500 mg capsule Commonly known as:  [place] Navin Take 1 Cap by mouth three (3) times daily. FREESTYLE LITE METER monitoring kit Generic drug:  Blood-Glucose Meter Check fasting sugars daily before breakfast. BRAND MEDICALLY NECESSARY FREESTYLE LITE STRIPS strip Generic drug:  glucose blood VI test strips Check fasting sugars daily before breakfast. BRAND MEDICALLY NECESSARY. FOR FREESTYLE LITE METER  
  
 furosemide 20 mg tablet Commonly known as:  LASIX Take one po daily prn swelling  
  
 gabapentin 300 mg capsule Commonly known as:  NEURONTIN  
TAKE 2 CAPSULES BY MOUTH EVERY MORNING AND 2 CAPSULES EVERY EVENING Lancets Misc Check fasting sugars daily before breakfast. BRAND MEDICALLY NECESSARY. FOR FREESTYLE LITE METER  
  
 metoprolol tartrate 50 mg tablet Commonly known as:  LOPRESSOR  
TAKE ONE TABLET BY MOUTH TWICE DAILY  
  
 oxyCODONE-acetaminophen 7.5-325 mg per tablet Commonly known as:  PERCOCET Take one po daily prn pain Prescriptions Sent to Pharmacy Refills  
 cephALEXin (KEFLEX) 500 mg capsule 0 Sig: Take 1 Cap by mouth three (3) times daily. Class: Normal  
 Pharmacy: [facility], [address] [place] [address],[address] Ph #: [phone] Route: Oral  
  
Introducing \[facility]\"" & HEALTH SERVICES! Yoly Varela introduces [employer] patient portal. Now you can access parts of your medical record, email your doctor's office, and request medication refills online. 1. In your internet browser, go to [email or web address] 2. Click on the First Time User? Click Here link in the Sign In box.  You will see the New Member Sign Up page. 3. Enter your Bottle Access Code exactly as it appears below. You will not need to use this code after youve completed the sign-up process. If you do not sign up before the expiration date, you must request a new code. · Bottle Access Code: [email or web address] Expires: 5/15/2018 10:38 AM 
 
4. Enter the last four digits of your Social Security Number (xxxx) and Date of Birth (mm/dd/yyyy) as indicated and click Submit. You will be taken to the next sign-up page. 5. Create a Bottle ID. This will be your Bottle login ID and cannot be changed, so think of one that is secure and easy to remember. 6. Create a Bottle password. You can change your password at any time. 7. Enter your Password Reset Question and Answer. This can be used at a later time if you forget your password. 8. Enter your e-mail address. You will receive e-mail notification when new information is available in [address] 9. Click Sign Up. You can now view and download portions of your medical record. 10. Click the Download Summary menu link to download a portable copy of your medical information. If you have questions, please visit the Frequently Asked Questions section of the Bottle website. Remember, Bottle is NOT to be used for urgent needs. For medical emergencies, dial 911. Now available from your iPhone and Android! Please provide this summary of care documentation to your next provider. Your primary care clinician is listed as Juan Rodríguez. If you have any questions after today's visit, please call [phone].

## 2018-02-15 ENCOUNTER — HOME CARE VISIT (OUTPATIENT)
Dept: HOME HEALTH SERVICES | Facility: HOME HEALTH | Age: 58
End: 2018-02-15

## 2018-02-19 LAB
BACTERIA SPEC CULT: ABNORMAL
GRAM STN SPEC: ABNORMAL
GRAM STN SPEC: ABNORMAL
SERVICE CMNT-IMP: ABNORMAL

## 2018-02-22 ENCOUNTER — OFFICE VISIT (OUTPATIENT)
Dept: INTERNAL MEDICINE CLINIC | Age: 58
End: 2018-02-22

## 2018-02-22 ENCOUNTER — HOSPITAL ENCOUNTER (OUTPATIENT)
Dept: LAB | Age: 58
Discharge: HOME | End: 2018-02-22
Payer: MEDICARE

## 2018-02-22 VITALS
HEIGHT: 67 IN | HEART RATE: 52 BPM | DIASTOLIC BLOOD PRESSURE: 82 MMHG | SYSTOLIC BLOOD PRESSURE: 130 MMHG | TEMPERATURE: 97 F | RESPIRATION RATE: 18 BRPM | OXYGEN SATURATION: 100 % | WEIGHT: 230.6 LBS | BODY MASS INDEX: 36.19 KG/M2

## 2018-02-22 DIAGNOSIS — E78.5 DYSLIPIDEMIA: ICD-10-CM

## 2018-02-22 DIAGNOSIS — Z76.0 MEDICATION REFILL: ICD-10-CM

## 2018-02-22 DIAGNOSIS — G89.29 OTHER CHRONIC PAIN: ICD-10-CM

## 2018-02-22 DIAGNOSIS — E11.9 DIABETES MELLITUS, STABLE (HCC): ICD-10-CM

## 2018-02-22 DIAGNOSIS — L02.91 ABSCESS: Primary | ICD-10-CM

## 2018-02-22 DIAGNOSIS — I10 BENIGN HYPERTENSION WITHOUT CHF: ICD-10-CM

## 2018-02-22 LAB
ALBUMIN SERPL-MCNC: 3.7 G/DL (ref 3.4–5)
ALBUMIN/GLOB SERPL: 1.1 {RATIO} (ref 0.8–1.7)
ALP SERPL-CCNC: 93 U/L (ref 45–117)
ALT SERPL-CCNC: 45 U/L (ref 16–61)
ANION GAP SERPL CALC-SCNC: 9 MMOL/L (ref 3–18)
AST SERPL-CCNC: 22 U/L (ref 15–37)
BILIRUB SERPL-MCNC: 0.3 MG/DL (ref 0.2–1)
BUN SERPL-MCNC: 23 MG/DL (ref 7–18)
BUN/CREAT SERPL: 19 (ref 12–20)
CALCIUM SERPL-MCNC: 9 MG/DL (ref 8.5–10.1)
CHLORIDE SERPL-SCNC: 102 MMOL/L (ref 100–108)
CHOLEST SERPL-MCNC: 131 MG/DL
CO2 SERPL-SCNC: 28 MMOL/L (ref 21–32)
CREAT SERPL-MCNC: 1.2 MG/DL (ref 0.6–1.3)
CREAT UR-MCNC: 322.36 MG/DL (ref 30–125)
GLOBULIN SER CALC-MCNC: 3.5 G/DL (ref 2–4)
GLUCOSE SERPL-MCNC: 120 MG/DL (ref 74–99)
HBA1C MFR BLD: 7 % (ref 4.2–5.6)
HDLC SERPL-MCNC: 35 MG/DL (ref 40–60)
HDLC SERPL: 3.7 {RATIO} (ref 0–5)
LDLC SERPL CALC-MCNC: 78 MG/DL (ref 0–100)
LIPID PROFILE,FLP: ABNORMAL
MICROALBUMIN UR-MCNC: 1.1 MG/DL (ref 0–3)
MICROALBUMIN/CREAT UR-RTO: 3 MG/G (ref 0–30)
POTASSIUM SERPL-SCNC: 4 MMOL/L (ref 3.5–5.5)
PROT SERPL-MCNC: 7.2 G/DL (ref 6.4–8.2)
SODIUM SERPL-SCNC: 139 MMOL/L (ref 136–145)
TRIGL SERPL-MCNC: 90 MG/DL (ref ?–150)
VLDLC SERPL CALC-MCNC: 18 MG/DL

## 2018-02-22 PROCEDURE — 82570 ASSAY OF URINE CREATININE: CPT | Performed by: INTERNAL MEDICINE

## 2018-02-22 PROCEDURE — 36415 COLL VENOUS BLD VENIPUNCTURE: CPT | Performed by: INTERNAL MEDICINE

## 2018-02-22 PROCEDURE — 83036 HEMOGLOBIN GLYCOSYLATED A1C: CPT | Performed by: INTERNAL MEDICINE

## 2018-02-22 PROCEDURE — G0480 DRUG TEST DEF 1-7 CLASSES: HCPCS | Performed by: INTERNAL MEDICINE

## 2018-02-22 PROCEDURE — 80053 COMPREHEN METABOLIC PANEL: CPT | Performed by: INTERNAL MEDICINE

## 2018-02-22 PROCEDURE — 80061 LIPID PANEL: CPT | Performed by: INTERNAL MEDICINE

## 2018-02-22 RX ORDER — OXYCODONE AND ACETAMINOPHEN 7.5; 325 MG/1; MG/1
TABLET ORAL
Qty: 30 TAB | Refills: 0 | Status: SHIPPED | OUTPATIENT
Start: 2018-02-22 | End: 2018-03-26 | Stop reason: SDUPTHER

## 2018-02-22 NOTE — PROGRESS NOTES
Chief Complaint   Patient presents with    Hypertension     4 month f/u    Diabetes     4 month f/u    Cholesterol Problem     4 month f/u    Medication Refill       HPI:     Roxy Bailon is a 61 y.o.  male with history of type 2 DM and hypertension  here for the above complaint. He had abscess I & D in upper mid back area by Dr. Bakari Lewis at [place] 1 week ago on 2/14/18. He will see Dr. Eleazar Wright tomorrow. It was draining, but not anymore. It is getting smaller and was initially having a lot of pain. He denies any chest pain, shortness of breath, abdominal pain, headaches or dizziness, fevers, chills, night sweats. He is on keflex for the abscess. It is getting better. Type 2 DM: on average: 110. Sugar range: 100-120.            Past Medical History:   Diagnosis Date    Advance directive discussed with patient 04/24/2017    Anxiety     Back injury 4/27/2007    Behavioral change     Chronic pain     CKD (chronic kidney disease) stage 2, GFR 60-89 ml/min 12/20/2013    Confusion     DDD (degenerative disc disease)     Depression     Diabetes mellitus, stable ([address]     Diabetic eye exam ([facility] [address] [facility] 09/09/2016    Dr. Danisha Rodriguez ( bilateral cataracts)    Difficulty walking     DJD (degenerative joint disease)     Fatigue     Generalized stiffness     H/O colonoscopy 12/2/2013    Bx: showed 2 tubular adenomas Done by Dr. Balta Marmolejo    High cholesterol     Hypertension     Incoordination     Infected sebaceous cyst 5/4/2016    Insomnia     Joint pain     Lower extremity edema     Lymphedema     Muscle pain     Neuropathy     Osteoarthritis     Other and unspecified hyperlipidemia     Painful sexual intercourse     Poor concentration     Snoring     Type II or unspecified type diabetes mellitus without mention of complication, not stated as uncontrolled     Weakness      Past Surgical History:   Procedure Laterality Date    HX COLONOSCOPY      HX ORTHOPAEDIC left elbow surgery    HX ORTHOPAEDIC Left 1/19/16    left Total Knee replacement    HX OTHER SURGICAL  02/2018    removal of abscess in back     Current Outpatient Prescriptions   Medication Sig    oxyCODONE-acetaminophen (PERCOCET) 7.5-325 mg per tablet Take one po daily prn pain    cephALEXin (KEFLEX) 500 mg capsule Take 1 Cap by mouth three (3) times daily.  gabapentin (NEURONTIN) 300 mg capsule TAKE 2 CAPSULES BY MOUTH EVERY MORNING AND 2 CAPSULES EVERY EVENING    amLODIPine-benazepril (LOTREL) 5-20 mg per capsule TAKE ONE CAPSULE BY MOUTH ONCE DAILY    FREESTYLE LITE STRIPS strip Check fasting sugars daily before breakfast. BRAND MEDICALLY NECESSARY. FOR FREESTYLE LITE METER    Lancets misc Check fasting sugars daily before breakfast. BRAND MEDICALLY NECESSARY. FOR FREESTYLE LITE METER    furosemide (LASIX) 20 mg tablet Take one po daily prn swelling    atorvastatin (LIPITOR) 10 mg tablet TAKE 1 TABLET BY MOUTH DAILY    metoprolol tartrate (LOPRESSOR) 50 mg tablet TAKE ONE TABLET BY MOUTH TWICE DAILY    FREESTYLE LITE METER monitoring kit Check fasting sugars daily before breakfast. BRAND MEDICALLY NECESSARY    Aspirin, Buffered 81 mg tab Take 81 mg by mouth daily. No current facility-administered medications for this visit.       Health Maintenance   Topic Date Due    EYE EXAM RETINAL OR DILATED Q1  09/09/2017    MICROALBUMIN Q1  01/23/2018    HEMOGLOBIN A1C Q6M  01/24/2018    FOOT EXAM Q1  01/25/2018    LIPID PANEL Q1  07/24/2018    COLONOSCOPY  12/02/2018    DTaP/Tdap/Td series (2 - Td) 04/24/2027    Hepatitis C Screening  Completed    Pneumococcal 19-64 Medium Risk  Completed    Influenza Age 5 to Adult  Completed     Immunization History   Administered Date(s) Administered    Influenza Vaccine 02/06/2014, 09/09/2014    Influenza Vaccine (Quad) PF 11/23/2015, 01/23/2017, 10/24/2017    Pneumococcal Polysaccharide (PPSV-23) 10/24/2017     No LMP for male patient. Allergies and Intolerances:   No Known Allergies    Family History:   Family History   Problem Relation Age of Onset   [de-identified] Stroke Mother     Hypertension Mother     Seizures Mother     Diabetes Father     Heart Disease Father     Hypertension Father     Other Maternal Grandmother      tumor in abdomen       Social History:   He  reports that he has never smoked. He has never used smokeless tobacco.  He  reports that he does not drink alcohol. Objective:   Physical exam:   Visit Vitals    /82 (BP 1 Location: Left arm, BP Patient Position: Sitting)    Pulse (!) 52    Temp 97 °F (36.1 °C) (Oral)    Resp 18    Ht 5' 7\" (1.702 m)    Wt 230 lb 9.6 oz (104.6 kg)    SpO2 100%    BMI 36.12 kg/m2        Generally: Pleasant male in no acute distress  Cardiac Exam: regular, rate, and rhythm. Normal S1 and S2. No murmurs, gallops, or rubs  Pulmonary exam: Clear to auscultation bilaterally  Abdominal exam: Positive bowel sounds in all four quadrants, soft, nondistended, nontender  Extremities: 2+ dorsalis pedis pulses bilaterally. No pedal edema    bilaterally  Upper mid back exam: marble size mass that has some drainage that is not purulent. No TTP or erythema.      LABS/Radiological Tests:  Lab Results   Component Value Date/Time    WBC 5.0 01/23/2017 10:37 AM    HGB 15.8 01/23/2017 10:37 AM    HCT 48.8 (H) 01/23/2017 10:37 AM    PLATELET 386 71/28/0383 10:37 AM     Lab Results   Component Value Date/Time    Sodium 140 07/24/2017 11:03 AM    Potassium 4.0 07/24/2017 11:03 AM    Chloride 103 07/24/2017 11:03 AM    CO2 30 07/24/2017 11:03 AM    Glucose 127 (H) 07/24/2017 11:03 AM    BUN 18 07/24/2017 11:03 AM    Creatinine 1.18 07/24/2017 11:03 AM     Lab Results   Component Value Date/Time    Cholesterol, total 129 07/24/2017 11:03 AM    HDL Cholesterol 43 07/24/2017 11:03 AM    LDL, calculated 71.8 07/24/2017 11:03 AM    Triglyceride 71 07/24/2017 11:03 AM     No results found for: GPT  Component      Latest Ref Rng & Units 7/24/2017          11:03 AM   Hemoglobin A1c, (calculated)      4.2 - 5.6 % 6.7 (H)   10/13/2017  6:27 PM - Bao, Lab In [facility]   Component Results   Component   [employer] results   FINAL   Comment:   (NOTE)   ====================================================================   TOXASSURE SELECT 13 (MW)   ====================================================================   Test                             Result       Flag       Units   Drug Present    Oxycodone                      494                     ng/mg creat    Noroxycodone                   107                     ng/mg creat     Sources of oxycodone include scheduled prescription medications.     Noroxycodone is an expected metabolite of oxycodone.   ====================================================================   Test                      Result    Flag   Units      Ref Range    Creatinine              227              mg/dL      >=20   ====================================================================   Declared Medications:   Medication list was not provided.   ====================================================================   For clinical consultation, please call [phone].   ====================================================================   Performed At: [facility]   [address] [place], [place] [id number]   Roslyn aGo MD XW:[id number]      Previous labs      ASSESSMENT/PLAN:    1. Abscess: improving. Area was cleaned with normal saline and neosporin applied and did dressing change. Continue the keflex and f/u with Dr. Indu Paula tomorrow, 2/23/18. 2. Diabetes mellitus, stable ([facility] [address]): we will see what the lab show, but seems stable. Continue diet and exercise. Assessment & Plan:  Stable, based on history, physical exam and review of pertinent labs, studies and medications; meds reconciled; continue current treatment plan.   Key Antihyperglycemic Medications Patient is on no antihyperglycemic meds. Other Key Diabetic Medications             gabapentin (NEURONTIN) 300 mg capsule  (Taking) TAKE 2 CAPSULES BY MOUTH EVERY MORNING AND 2 CAPSULES EVERY EVENING    amLODIPine-benazepril (LOTREL) 5-20 mg per capsule  (Taking) TAKE ONE CAPSULE BY MOUTH ONCE DAILY    atorvastatin (LIPITOR) 10 mg tablet  (Taking) TAKE 1 TABLET BY MOUTH DAILY        Lab Results   Component Value Date/Time    Hemoglobin A1c 6.7 07/24/2017 11:03 AM    Glucose 127 07/24/2017 11:03 AM    Creatinine 1.18 07/24/2017 11:03 AM    Microalbumin/Creat ratio (mg/g creat) 5 01/23/2017 10:37 AM    Microalbumin,urine random 0.90 01/23/2017 10:37 AM    Cholesterol, total 129 07/24/2017 11:03 AM    HDL Cholesterol 43 07/24/2017 11:03 AM    LDL, calculated 71.8 07/24/2017 11:03 AM    Triglyceride 71 07/24/2017 11:03 AM     Diabetic Foot and Eye Exam HM Status   Topic Date Due    Eye Exam  09/09/2017    Diabetic Foot Care  01/25/2018       Orders:  -     HEMOGLOBIN A1C W/O EAG; Future  -     MICROALBUMIN, UR, RAND W/ MICROALB/CREAT RATIO; Future    3. Benign hypertension without CHF: stable. Continue the lopressor, lotrel, lasix, diet and exercise. 4. Dyslipidemia: we will see what the labs show. Continue the lipitor, diet and exercise. -     METABOLIC PANEL, COMPREHENSIVE; Future  -     LIPID PANEL; Future    5. Other chronic pain: checked  and pt is aware to get pain meds from one pharmacy, otherwise will not be able to refill medications. UDS: due 2/23/18. Will do today. Pain agreement signed on 4/24/17.  -     oxyCODONE-acetaminophen (PERCOCET) 7.5-325 mg per tablet; Take one po daily prn pain  -     TOXASSURE SELECT 13 (MW); Future    6. Medication refill  -     oxyCODONE-acetaminophen (PERCOCET) 7.5-325 mg per tablet; Take one po daily prn pain    7.    Requested Prescriptions     Signed Prescriptions Disp Refills    oxyCODONE-acetaminophen (PERCOCET) 7.5-325 mg per tablet 30 Tab 0     Sig: Take one po daily prn pain       8. Patient verbalized understanding and agreement with the plan. 9. Patient was given an after-visit summary. 10. Follow-up Disposition:  Return in about 3 months (around 5/22/2018) for f/u DM/HTN/lipids. or sooner if worsening symptoms.                 Jeannie Sauer MD

## 2018-02-22 NOTE — ASSESSMENT & PLAN NOTE
Stable, based on history, physical exam and review of pertinent labs, studies and medications; meds reconciled; continue current treatment plan. Key Antihyperglycemic Medications     Patient is on no antihyperglycemic meds.         Other Key Diabetic Medications             gabapentin (NEURONTIN) 300 mg capsule  (Taking) TAKE 2 CAPSULES BY MOUTH EVERY MORNING AND 2 CAPSULES EVERY EVENING    amLODIPine-benazepril (LOTREL) 5-20 mg per capsule  (Taking) TAKE ONE CAPSULE BY MOUTH ONCE DAILY    atorvastatin (LIPITOR) 10 mg tablet  (Taking) TAKE 1 TABLET BY MOUTH DAILY        Lab Results   Component Value Date/Time    Hemoglobin A1c 6.7 07/24/2017 11:03 AM    Glucose 127 07/24/2017 11:03 AM    Creatinine 1.18 07/24/2017 11:03 AM    Microalbumin/Creat ratio (mg/g creat) 5 01/23/2017 10:37 AM    Microalbumin,urine random 0.90 01/23/2017 10:37 AM    Cholesterol, total 129 07/24/2017 11:03 AM    HDL Cholesterol 43 07/24/2017 11:03 AM    LDL, calculated 71.8 07/24/2017 11:03 AM    Triglyceride 71 07/24/2017 11:03 AM     Diabetic Foot and Eye Exam HM Status   Topic Date Due    Eye Exam  09/09/2017    Diabetic Foot Care  01/25/2018

## 2018-02-22 NOTE — MR AVS SNAPSHOT
[address] [place] [address] 
 
 
 [address] [facility] [address] [address] 
[phone] Patient: Bam Sterling MRN: [id number] :1960 Visit Information Date & Time Provider Department Dept. Phone Encounter #  
 2018  9:15 AM Gisel Okeefe MD [employer] [phone] [id number] Follow-up Instructions Return in about 3 months (around 2018) for f/u DM/HTN/lipids. Your Appointments 2018 10:45 AM  
Follow Up with Alberto Rosales NP [address] (CHARLENE Cabezas) Appt Note: 1 week fu; 1 week fu  
 [address] [facility] [address] [facility] [address] [phone]  
  
   
 [address] [facility] [address] [address] Upcoming Health Maintenance Date Due  
 EYE EXAM RETINAL OR DILATED Q1 2017 MICROALBUMIN Q1 2018 HEMOGLOBIN A1C Q6M 2018 FOOT EXAM Q1 2018 LIPID PANEL Q1 2018 COLONOSCOPY 2018 DTaP/Tdap/Td series (2 - Td) 2027 Allergies as of 2018  Review Complete On: 2018 By: Rhonda Oliveros MD  
 No Known Allergies Current Immunizations  Reviewed on 10/24/2017 Name Date Influenza Vaccine 2014  9:28 AM, 2014  1:10 AM  
 Influenza Vaccine (Quad) PF 10/24/2017, 2017, 2015 Pneumococcal Polysaccharide (PPSV-23) 10/24/2017 Not reviewed this visit You Were Diagnosed With   
  
 Codes Comments Dyslipidemia    -  Primary ICD-10-CM: E78.5 ICD-9-CM: 272.4 Medication refill     ICD-10-CM: Z76.0 ICD-9-CM: V68.1 Other chronic pain     ICD-10-CM: G89.29 ICD-9-CM: 338.29 Diabetes mellitus, stable ([facility] [address]     ICD-10-CM: E11.9 ICD-9-CM: 250.00 Benign hypertension without CHF     ICD-10-CM: I10 
ICD-9-CM: 401.1 Vitals BP Pulse Temp Resp Height(growth percentile) Weight(growth percentile) (!) 152/98 (BP 1 Location: Left arm, BP Patient Position: Sitting) (!) 52 97 °F (36.1 °C) (Oral) 18 5' 7\" (1.702 m) 230 lb 9.6 oz (104.6 kg) SpO2 BMI Smoking Status 100% 36.12 kg/m2 Never Smoker Vitals History BMI and BSA Data Body Mass Index Body Surface Area  
 36.12 kg/m 2 2.22 m 2 Preferred Pharmacy Pharmacy Name Phone [facility]- [address] [phone] Your Updated Medication List  
  
   
This list is accurate as of 2/22/18  9:26 AM.  Always use your most recent med list. amLODIPine-benazepril 5-20 mg per capsule Commonly known as:  LOTREL  
TAKE ONE CAPSULE BY MOUTH ONCE DAILY  
  
 aspirin, buffered 81 mg Tab Take 81 mg by mouth daily. atorvastatin 10 mg tablet Commonly known as:  LIPITOR  
TAKE 1 TABLET BY MOUTH DAILY  
  
 cephALEXin 500 mg capsule Commonly known as:  Kandy Agrawal Take 1 Cap by mouth three (3) times daily. FREESTYLE LITE METER monitoring kit Generic drug:  Blood-Glucose Meter Check fasting sugars daily before breakfast. BRAND MEDICALLY NECESSARY FREESTYLE LITE STRIPS strip Generic drug:  glucose blood VI test strips Check fasting sugars daily before breakfast. BRAND MEDICALLY NECESSARY. FOR FREESTYLE LITE METER  
  
 furosemide 20 mg tablet Commonly known as:  LASIX Take one po daily prn swelling  
  
 gabapentin 300 mg capsule Commonly known as:  NEURONTIN  
TAKE 2 CAPSULES BY MOUTH EVERY MORNING AND 2 CAPSULES EVERY EVENING Lancets Misc Check fasting sugars daily before breakfast. BRAND MEDICALLY NECESSARY. FOR FREESTYLE LITE METER  
  
 metoprolol tartrate 50 mg tablet Commonly known as:  LOPRESSOR  
TAKE ONE TABLET BY MOUTH TWICE DAILY  
  
 oxyCODONE-acetaminophen 7.5-325 mg per tablet Commonly known as:  PERCOCET Take one po daily prn pain Prescriptions Printed Refills oxyCODONE-acetaminophen (PERCOCET) 7.5-325 mg per tablet 0 Sig: Take one po daily prn pain  
 Class: Print Follow-up Instructions Return in about 3 months (around 5/22/2018) for f/u DM/HTN/lipids. To-Do List   
 02/22/2018 Lab:  HEMOGLOBIN A1C W/O EAG   
  
 02/22/2018 Lab:  LIPID PANEL   
  
 02/22/2018 Lab:  METABOLIC PANEL, COMPREHENSIVE   
  
 02/22/2018 Lab:  MICROALBUMIN, UR, RAND W/ MICROALB/CREAT RATIO   
  
 02/22/2018 Lab:  Georgie Jolly 13 (MW)   
  
  
Patient Instructions 1) follow-up in 3 months or sooner if worsening symptoms. Introducing [facility] & HEALTH SERVICES! [facility] introduces [employer] patient portal. Now you can access parts of your medical record, email your doctor's office, and request medication refills online. 1. In your internet browser, go to [email or web address] 2. Click on the First Time User? Click Here link in the Sign In box. You will see the New Member Sign Up page. 3. Enter your Xcalar Access Code exactly as it appears below. You will not need to use this code after youve completed the sign-up process. If you do not sign up before the expiration date, you must request a new code. · [employer] Access Code: [email or web address] Expires: 5/15/2018 10:38 AM 
 
4. Enter the last four digits of your Social Security Number (xxxx) and Date of Birth (mm/dd/yyyy) as indicated and click Submit. You will be taken to the next sign-up page. 5. Create a Xcalar ID. This will be your Xcalar login ID and cannot be changed, so think of one that is secure and easy to remember. 6. Create a [employer]ar password. You can change your password at any time. 7. Enter your Password Reset Question and Answer. This can be used at a later time if you forget your password. 8. Enter your e-mail address. You will receive e-mail notification when new information is available in [address] 9. Click Sign Up. You can now view and download portions of your medical record. 10. Click the Download Summary menu link to download a portable copy of your medical information. If you have questions, please visit the Frequently Asked Questions section of the [employer] website. Remember, [employer] is NOT to be used for urgent needs. For medical emergencies, dial 911. Now available from your iPhone and Android! Please provide this summary of care documentation to your next provider. Your primary care clinician is listed as Juan Rodríguez. If you have any questions after today's visit, please call [phone].

## 2018-02-23 ENCOUNTER — OFFICE VISIT (OUTPATIENT)
Dept: SURGERY | Age: 58
End: 2018-02-23

## 2018-02-23 VITALS
OXYGEN SATURATION: 99 % | RESPIRATION RATE: 18 BRPM | HEIGHT: 67 IN | HEART RATE: 54 BPM | SYSTOLIC BLOOD PRESSURE: 130 MMHG | BODY MASS INDEX: 36.1 KG/M2 | TEMPERATURE: 97 F | WEIGHT: 230 LBS | DIASTOLIC BLOOD PRESSURE: 80 MMHG

## 2018-02-23 DIAGNOSIS — L72.3 INFECTED SEBACEOUS CYST: ICD-10-CM

## 2018-02-23 DIAGNOSIS — Z09 POSTOPERATIVE EXAMINATION: Primary | ICD-10-CM

## 2018-02-23 DIAGNOSIS — L08.9 INFECTED SEBACEOUS CYST: ICD-10-CM

## 2018-02-23 NOTE — PROGRESS NOTES
Christina Haji. Vikram Hart, PEDRO PABLO-C  PROGRESS NOTE        Subjective:  Mr. Silvia De Jesus is a very pleasant 60 yo AA male who presents today for a one week wound check following incision and drainage of infected sebaceous cyst. He was coming into the office for wound care initially, but has been having family take care of it since Friday. Today, it has closed because his family had not been packing it. He does complain of a little bit of soreness to the area, but this can be attributed to the fluid collection deep to the incision. We discussed how a wound heals and since he is still taking antibiotics, he needs to finish those and allow his body to reabsorb the fluid. We can touch base again in 2 weeks to make sure he is on a continued path to healing. Objective:  Vitals:    02/23/18 0950   BP: 130/80   Pulse: (!) 54   Resp: 18   Temp: 97 °F (36.1 °C)   TempSrc: Oral   SpO2: 99%   Weight: 104.3 kg (230 lb)   Height: 5' 7\" (1.702 m)       Physical Exam:    General: in no apparent distress, alert, oriented times 3, afebrile and cooperative   Incision:   healing well. Wound bed remains open without any depth. Likely seroma deep to incision. Wound bed is pink. Unable to express any drainage with manipulation. Covered area with a bandaid. Current Medications:  Current Outpatient Prescriptions   Medication Sig Dispense Refill    oxyCODONE-acetaminophen (PERCOCET) 7.5-325 mg per tablet Take one po daily prn pain 30 Tab 0    cephALEXin (KEFLEX) 500 mg capsule Take 1 Cap by mouth three (3) times daily. 30 Cap 0    gabapentin (NEURONTIN) 300 mg capsule TAKE 2 CAPSULES BY MOUTH EVERY MORNING AND 2 CAPSULES EVERY EVENING 360 Cap 3    amLODIPine-benazepril (LOTREL) 5-20 mg per capsule TAKE ONE CAPSULE BY MOUTH ONCE DAILY 90 Cap 3    FREESTYLE LITE STRIPS strip Check fasting sugars daily before breakfast. BRAND MEDICALLY NECESSARY.  FOR FREESTYLE LITE METER 100 Strip 11    Lancets misc Check fasting sugars daily before breakfast. BRAND MEDICALLY NECESSARY. FOR FREESTYLE LITE METER 100 Each 11    furosemide (LASIX) 20 mg tablet Take one po daily prn swelling 30 Tab 1    atorvastatin (LIPITOR) 10 mg tablet TAKE 1 TABLET BY MOUTH DAILY 90 Tab 3    metoprolol tartrate (LOPRESSOR) 50 mg tablet TAKE ONE TABLET BY MOUTH TWICE DAILY 180 Tab 3    FREESTYLE LITE METER monitoring kit Check fasting sugars daily before breakfast. BRAND MEDICALLY NECESSARY 1 Kit 0    Aspirin, Buffered 81 mg tab Take 81 mg by mouth daily. Chart and notes reviewed. Data reviewed. I have evaluated and examined the patient. Impression:    · One week wound check following incision and drainage of infected sebaceous cyst doing well. Plan:  · Finish antibiiotics  · Keep area covered with bandaid  · Follow up in 2 weeks    Mr. Mary Brooke has a reminder for a \"due or due soon\" health maintenance. I have asked that he contact his primary care provider for follow-up on this health maintenance. Cady Gavin.  Mary Damon, SANDRAP-C

## 2018-02-23 NOTE — PATIENT INSTRUCTIONS
Epidermoid Cyst: Care Instructions  Your Care Instructions  An epidermoid (say \"sd-uzn-FLD-elana\") cyst is a lump just under the skin. These cysts can form when a hair follicle becomes blocked. They are common in acne and may occur on the face, neck, back, and genitals. However, they can form anywhere on the body. These cysts are not cancer and do not lead to cancer. They tend not to hurt, but they can sometimes become swollen and painful. They also may break open (rupture) and cause scarring. These cysts sometimes do not cause problems and may not need treatment. If you have a cyst that is swollen and hurts, your doctor may inject it with a medicine to help it heal. But it is more likely that a painful cyst will need to be removed. Your doctor will give you a shot of numbing medicine and cut into the cyst to drain it or remove it. This makes the symptoms go away. Follow-up care is a key part of your treatment and safety. Be sure to make and go to all appointments, and call your doctor if you are having problems. It's also a good idea to know your test results and keep a list of the medicines you take. How can you care for yourself at home? · Do not squeeze the cyst or poke it with a needle to open it. This can cause swelling, redness, and infection. · Always have a doctor look at any new lumps you get to make sure that they are not serious. When should you call for help? Watch closely for changes in your health, and be sure to contact your doctor if:  ? · You have a fever, redness, or swelling after you get a shot of medicine in the cyst.   ? · You see or feel a new lump on your skin. Where can you learn more? Go to http://yanci-ava.info/. Enter C782 in the search box to learn more about \"Epidermoid Cyst: Care Instructions. \"  Current as of: October 13, 2016  Content Version: 11.4  © 1139-9759 Healthwise, [employer].  Care instructions adapted under license by [employer] (which disclaims liability or warranty for this information). If you have questions about a medical condition or this instruction, always ask your healthcare professional. [address] any warranty or liability for your use of this information.

## 2018-02-23 NOTE — PROGRESS NOTES
Please let pt know that labs were normal except:    1) HgA1c has gone up to 7.0 and was 6.7. He needs to work on diet and exercise. Glucose 120. If HgA1c is still 7 or > at 3 month visit, we will need to put on DM medication. 2) creatinine in urine. We will monitor. 3) BUN, kidney function, up at 23. Probably up from dehydration from fasting. Keep hydrated with water. We will monitor. 4) UDS still pending. We will notify him once we the results.

## 2018-02-23 NOTE — MR AVS SNAPSHOT
[facility] 
 
 
 [address] [facility] [address] [facility] [address] 
[phone] Patient: Belkis Draper MRN: [id number] :[id number] Visit Information Date & Time Provider Department Dept. Phone Encounter #  
 2018 10:45 AM Aliza Bradford NP [facility] 97 [id number] Follow-up Instructions Return in about 2 weeks (around 3/9/2018) for wound care. Follow-up and Disposition History Your Appointments 3/9/2018 10:00 AM  
Follow Up with Aliza Bradford NP [address] (CHARLENE [place]) Appt Note: 2 week fu  
 [address] [facility] [address] [facility] [address]  
[phone]  
  
   
 [address] [facility] [address] [id number] 2018  9:15 AM  
Office Visit with Shaan Daigle MD  
[facility] [address] [facility]) Appt Note: 3 mo f/u htn, dm, lipids [address] [facility]  
  
   
 [address] [facility] Upcoming Health Maintenance Date Due  
 EYE EXAM RETINAL OR DILATED Q1 2017 FOOT EXAM Q1 2018 HEMOGLOBIN A1C Q6M 2018 COLONOSCOPY 2018 MICROALBUMIN Q1 2019 LIPID PANEL Q1 2019 DTaP/Tdap/Td series (2 - Td) 2027 Allergies as of 2018  Review Complete On: 2018 By: Aliza Bradford NP No Known Allergies Current Immunizations  Reviewed on 10/24/2017 Name Date Influenza Vaccine 2014  9:28 AM, 2014  1:10 AM  
 Influenza Vaccine (Quad) PF 10/24/2017, 2017, 2015 Pneumococcal Polysaccharide (PPSV-23) 10/24/2017 Not reviewed this visit You Were Diagnosed With   
  
 Codes Comments Postoperative examination    -  Primary ICD-10-CM: Z96 ICD-9-CM: V67.00 Infected sebaceous cyst     ICD-10-CM: L72.3, L08.9 ICD-9-CM: 706. 2 Vitals BP Pulse Temp Resp Height(growth percentile) Weight(growth percentile) 130/80 (!) 54 97 °F (36.1 °C) (Oral) 18 5' 7\" (1.702 m) 230 lb (104.3 kg) SpO2 BMI Smoking Status 99% 36.02 kg/m2 Never Smoker Vitals History BMI and BSA Data Body Mass Index Body Surface Area 36.02 kg/m 2 2.22 m 2 Preferred Pharmacy Pharmacy Name Phone [id number] [phone], [address] [place] [address] [address] [place] [address] [phone] Your Updated Medication List  
  
   
This list is accurate as of 2/23/18 11:22 AM.  Always use your most recent med list. amLODIPine-benazepril 5-20 mg per capsule Commonly known as:  LOTREL  
TAKE ONE CAPSULE BY MOUTH ONCE DAILY  
  
 aspirin, buffered 81 mg Tab Take 81 mg by mouth daily. atorvastatin 10 mg tablet Commonly known as:  LIPITOR  
TAKE 1 TABLET BY MOUTH DAILY  
  
 cephALEXin 500 mg capsule Commonly known as:  Kandy Agrawal Take 1 Cap by mouth three (3) times daily. FREESTYLE LITE METER monitoring kit Generic drug:  Blood-Glucose Meter Check fasting sugars daily before breakfast. BRAND MEDICALLY NECESSARY FREESTYLE LITE STRIPS strip Generic drug:  glucose blood VI test strips Check fasting sugars daily before breakfast. BRAND MEDICALLY NECESSARY. FOR FREESTYLE LITE METER  
  
 furosemide 20 mg tablet Commonly known as:  LASIX Take one po daily prn swelling  
  
 gabapentin 300 mg capsule Commonly known as:  NEURONTIN  
TAKE 2 CAPSULES BY MOUTH EVERY MORNING AND 2 CAPSULES EVERY EVENING Lancets Misc Check fasting sugars daily before breakfast. BRAND MEDICALLY NECESSARY. FOR FREESTYLE LITE METER  
  
 metoprolol tartrate 50 mg tablet Commonly known as:  LOPRESSOR  
TAKE ONE TABLET BY MOUTH TWICE DAILY  
  
 oxyCODONE-acetaminophen 7.5-325 mg per tablet Commonly known as:  PERCOCET Take one po daily prn pain Follow-up Instructions Return in about 2 weeks (around 3/9/2018) for wound care. Patient Instructions Epidermoid Cyst: Care Instructions Your Care Instructions An epidermoid (say \"qv-gjo-ZKT-elana\") cyst is a lump just under the skin. These cysts can form when a hair follicle becomes blocked. They are common in acne and may occur on the face, neck, back, and genitals. However, they can form anywhere on the body. These cysts are not cancer and do not lead to cancer. They tend not to hurt, but they can sometimes become swollen and painful. They also may break open (rupture) and cause scarring. These cysts sometimes do not cause problems and may not need treatment. If you have a cyst that is swollen and hurts, your doctor may inject it with a medicine to help it heal. But it is more likely that a painful cyst will need to be removed. Your doctor will give you a shot of numbing medicine and cut into the cyst to drain it or remove it. This makes the symptoms go away. Follow-up care is a key part of your treatment and safety. Be sure to make and go to all appointments, and call your doctor if you are having problems. It's also a good idea to know your test results and keep a list of the medicines you take. How can you care for yourself at home? · Do not squeeze the cyst or poke it with a needle to open it. This can cause swelling, redness, and infection. · Always have a doctor look at any new lumps you get to make sure that they are not serious. When should you call for help? Watch closely for changes in your health, and be sure to contact your doctor if: 
? · You have a fever, redness, or swelling after you get a shot of medicine in the cyst.  
? · You see or feel a new lump on your skin. Where can you learn more? Go to http://yanci-ava.info/. Enter Z611 in the search box to learn more about \"Epidermoid Cyst: Care Instructions. \" Current as of: October 13, 2016 Content Version: 11.4 © 5866-3030 Healthwise, Incorporated. Care instructions adapted under license by [employer] (which disclaims liability or warranty for this information). If you have questions about a medical condition or this instruction, always ask your healthcare professional. [address] any warranty or liability for your use of this information. Patient Instructions History Introducing [facility]! Neil Cordon introduces [employer] patient portal. Now you can access parts of your medical record, email your doctor's office, and request medication refills online. 1. In your internet browser, go to [email or web address] 2. Click on the First Time User? Click Here link in the Sign In box. You will see the New Member Sign Up page. 3. Enter your [employer] Access Code exactly as it appears below. You will not need to use this code after youve completed the sign-up process. If you do not sign up before the expiration date, you must request a new code. · [employer] Access Code: [email or web address] Expires: 5/15/2018 10:38 AM 
 
4. Enter the last four digits of your Social Security Number (xxxx) and Date of Birth (mm/dd/yyyy) as indicated and click Submit. You will be taken to the next sign-up page. 5. Create a [employer] ID. This will be your [employer] login ID and cannot be changed, so think of one that is secure and easy to remember. 6. Create a [employer] password. You can change your password at any time. 7. Enter your Password Reset Question and Answer. This can be used at a later time if you forget your password. 8. Enter your e-mail address. You will receive e-mail notification when new information is available in [address] 9. Click Sign Up. You can now view and download portions of your medical record. 10. Click the Download Summary menu link to download a portable copy of your medical information. If you have questions, please visit the Frequently Asked Questions section of the [employer] website. Remember, [employer] is NOT to be used for urgent needs. For medical emergencies, dial 911. Now available from your iPhone and Android! Please provide this summary of care documentation to your next provider. Your primary care clinician is listed as Juan Rodríguez. If you have any questions after today's visit, please call [phone].

## 2018-02-23 NOTE — PROGRESS NOTES
Please see result note below and also let pt know that HDL, \"good cholesterol,\" low at 35 and needs to be 40 or more. Work on increasing exercise.

## 2018-02-24 ENCOUNTER — TELEPHONE (OUTPATIENT)
Dept: INTERNAL MEDICINE CLINIC | Age: 58
End: 2018-02-24

## 2018-02-24 NOTE — TELEPHONE ENCOUNTER
Contacted pt at [facility] number. Two patient Identifiers confirmed. Advised pt per Dr Sol Baeza notes. Pt verbalized understanding.

## 2018-02-24 NOTE — TELEPHONE ENCOUNTER
----- Message from Mari Pena MD sent at 2/23/2018 11:46 AM EST -----  Please see result note below and also let pt know that HDL, \"good cholesterol,\" low at 35 and needs to be 40 or more. Work on increasing exercise.

## 2018-02-24 NOTE — TELEPHONE ENCOUNTER
----- Message from Tony Greenwood MD sent at 2/23/2018 11:45 AM EST -----  Please let pt know that labs were normal except:    1) HgA1c has gone up to 7.0 and was 6.7. He needs to work on diet and exercise. Glucose 120. If HgA1c is still 7 or > at 3 month visit, we will need to put on DM medication. 2) creatinine in urine. We will monitor. 3) BUN, kidney function, up at 23. Probably up from dehydration from fasting. Keep hydrated with water. We will monitor. 4) UDS still pending. We will notify him once we the results.

## 2018-02-28 LAB — TOXASSURE SELECT 13: NORMAL

## 2018-03-09 ENCOUNTER — OFFICE VISIT (OUTPATIENT)
Dept: SURGERY | Age: 58
End: 2018-03-09

## 2018-03-09 VITALS
SYSTOLIC BLOOD PRESSURE: 150 MMHG | HEART RATE: 60 BPM | HEIGHT: 67 IN | RESPIRATION RATE: 18 BRPM | BODY MASS INDEX: 36.1 KG/M2 | WEIGHT: 230 LBS | DIASTOLIC BLOOD PRESSURE: 100 MMHG | OXYGEN SATURATION: 98 %

## 2018-03-09 DIAGNOSIS — Z09 POSTOPERATIVE EXAMINATION: Primary | ICD-10-CM

## 2018-03-09 DIAGNOSIS — L02.212 BACK ABSCESS: ICD-10-CM

## 2018-03-09 NOTE — PROGRESS NOTES
Lyssa Agrawal. Robert Claude, FNP-C  PROGRESS NOTE        Subjective:  Mr. Leila Duran is a very pleasant 60 yo AA male who presents today for a two week wound check following incision and drainage of infected sebaceous cyst. He completed his antibiotics and states he is doing well. He hasn't had to do any wound care at all since our last visit. He says it remains sore because of where it's located on his back, but it is not infected and he hasn't had any fevers or drainage. We did discuss the personality of an inclusion cyst and the strong likelihood this will return. He would like to go ahead and have it surgically removed while it is not infected. Leila Duran has been given the following recommendations today due to his elevated BP reading: lifestyle modifications to include: dietary sodium restriction and increase physical activity and referred to Alternative/PCP. He states he has medication for his blood pressure which he took this morning. Objective:  Vitals:    03/09/18 1003 03/09/18 1017   BP: (!) 150/100 (!) 150/100   Pulse: 60    Resp: 18    SpO2: 98%    Weight: 104.3 kg (230 lb)    Height: 5' 7\" (1.702 m)        Physical Exam:    General: in no apparent distress, alert, oriented times 3, afebrile and cooperative   Incision:    Wound well healed, but there remains some edema deep to incision. Seroma vs retained cystic contents? No signs of infection noted. Not painful to palpation or manipulation. Current Medications:  Current Outpatient Prescriptions   Medication Sig Dispense Refill    oxyCODONE-acetaminophen (PERCOCET) 7.5-325 mg per tablet Take one po daily prn pain 30 Tab 0    gabapentin (NEURONTIN) 300 mg capsule TAKE 2 CAPSULES BY MOUTH EVERY MORNING AND 2 CAPSULES EVERY EVENING 360 Cap 3    amLODIPine-benazepril (LOTREL) 5-20 mg per capsule TAKE ONE CAPSULE BY MOUTH ONCE DAILY 90 Cap 3    FREESTYLE LITE STRIPS strip Check fasting sugars daily before breakfast. BRAND MEDICALLY NECESSARY.  FOR FREESTYLE LITE METER 100 Strip 11    Lancets misc Check fasting sugars daily before breakfast. BRAND MEDICALLY NECESSARY. FOR FREESTYLE LITE METER 100 Each 11    furosemide (LASIX) 20 mg tablet Take one po daily prn swelling 30 Tab 1    atorvastatin (LIPITOR) 10 mg tablet TAKE 1 TABLET BY MOUTH DAILY 90 Tab 3    metoprolol tartrate (LOPRESSOR) 50 mg tablet TAKE ONE TABLET BY MOUTH TWICE DAILY 180 Tab 3    FREESTYLE LITE METER monitoring kit Check fasting sugars daily before breakfast. BRAND MEDICALLY NECESSARY 1 Kit 0    Aspirin, Buffered 81 mg tab Take 81 mg by mouth daily.  cephALEXin (KEFLEX) 500 mg capsule Take 1 Cap by mouth three (3) times daily. 30 Cap 0       Chart and notes reviewed. Data reviewed. I have evaluated and examined the patient. Impression:    · Infected sebaceous cyst drained in the office 3 weeks ago. No infection. Wound healed. Back to baseline. Plan:  · Schedule surgery to remove at [facility], surgery scheduler, will call to schedule this    Mr. Ashley Zamora has a reminder for a \"due or due soon\" health maintenance. I have asked that he contact his primary care provider for follow-up on this health maintenance. Yi Zarate.  Naeem Soler, SANDRAP-C

## 2018-03-09 NOTE — PROGRESS NOTES
Please call our office at [phone] if you have any questions or concerns. Aurelia Kc has been given the following recommendations today due to his elevated BP reading: Please follow up with PCP regarding elevated BP.

## 2018-03-09 NOTE — PATIENT INSTRUCTIONS
Epidermoid Cyst: Care Instructions  Your Care Instructions  An epidermoid (say \"mc-rjq-CAI-elana\") cyst is a lump just under the skin. These cysts can form when a hair follicle becomes blocked. They are common in acne and may occur on the face, neck, back, and genitals. However, they can form anywhere on the body. These cysts are not cancer and do not lead to cancer. They tend not to hurt, but they can sometimes become swollen and painful. They also may break open (rupture) and cause scarring. These cysts sometimes do not cause problems and may not need treatment. If you have a cyst that is swollen and hurts, your doctor may inject it with a medicine to help it heal. But it is more likely that a painful cyst will need to be removed. Your doctor will give you a shot of numbing medicine and cut into the cyst to drain it or remove it. This makes the symptoms go away. Follow-up care is a key part of your treatment and safety. Be sure to make and go to all appointments, and call your doctor if you are having problems. It's also a good idea to know your test results and keep a list of the medicines you take. How can you care for yourself at home? · Do not squeeze the cyst or poke it with a needle to open it. This can cause swelling, redness, and infection. · Always have a doctor look at any new lumps you get to make sure that they are not serious. When should you call for help? Watch closely for changes in your health, and be sure to contact your doctor if:  ? · You have a fever, redness, or swelling after you get a shot of medicine in the cyst.   ? · You see or feel a new lump on your skin. Where can you learn more? Go to http://yanci-ava.info/. Enter Q825 in the search box to learn more about \"Epidermoid Cyst: Care Instructions. \"  Current as of: October 13, 2016  Content Version: 11.4  © 5017-1485 [employer].  Care instructions adapted under license by [employer] (which disclaims liability or warranty for this information). If you have questions about a medical condition or this instruction, always ask your healthcare professional. [address] any warranty or liability for your use of this information.

## 2018-03-12 ENCOUNTER — TELEPHONE (OUTPATIENT)
Dept: SURGERY | Age: 58
End: 2018-03-12

## 2018-03-12 ENCOUNTER — HOSPITAL ENCOUNTER (OUTPATIENT)
Dept: PREADMISSION TESTING | Age: 58
Discharge: HOME | End: 2018-03-12
Payer: MEDICARE

## 2018-03-12 DIAGNOSIS — L08.9 INFECTED SEBACEOUS CYST: Primary | ICD-10-CM

## 2018-03-12 DIAGNOSIS — L72.3 INFECTED SEBACEOUS CYST: ICD-10-CM

## 2018-03-12 DIAGNOSIS — L72.3 INFECTED SEBACEOUS CYST: Primary | ICD-10-CM

## 2018-03-12 DIAGNOSIS — L08.9 INFECTED SEBACEOUS CYST: ICD-10-CM

## 2018-03-12 LAB
ANION GAP SERPL CALC-SCNC: 4 MMOL/L (ref 3–18)
ATRIAL RATE: 54 BPM
BASOPHILS # BLD: 0 K/UL (ref 0–0.06)
BASOPHILS NFR BLD: 0 % (ref 0–2)
BUN SERPL-MCNC: 19 MG/DL (ref 7–18)
BUN/CREAT SERPL: 16 (ref 12–20)
CALCIUM SERPL-MCNC: 9.3 MG/DL (ref 8.5–10.1)
CALCULATED P AXIS, ECG09: 61 DEGREES
CALCULATED R AXIS, ECG10: 43 DEGREES
CALCULATED T AXIS, ECG11: 4 DEGREES
CHLORIDE SERPL-SCNC: 103 MMOL/L (ref 100–108)
CO2 SERPL-SCNC: 33 MMOL/L (ref 21–32)
CREAT SERPL-MCNC: 1.21 MG/DL (ref 0.6–1.3)
DIAGNOSIS, 93000: NORMAL
DIFFERENTIAL METHOD BLD: ABNORMAL
EOSINOPHIL # BLD: 0.2 K/UL (ref 0–0.4)
EOSINOPHIL NFR BLD: 3 % (ref 0–5)
ERYTHROCYTE [DISTWIDTH] IN BLOOD BY AUTOMATED COUNT: 13.1 % (ref 11.6–14.5)
GLUCOSE SERPL-MCNC: 120 MG/DL (ref 74–99)
HCT VFR BLD AUTO: 46.4 % (ref 36–48)
HGB BLD-MCNC: 15.6 G/DL (ref 13–16)
LYMPHOCYTES # BLD: 2.1 K/UL (ref 0.9–3.6)
LYMPHOCYTES NFR BLD: 37 % (ref 21–52)
MCH RBC QN AUTO: 26.2 PG (ref 24–34)
MCHC RBC AUTO-ENTMCNC: 33.6 G/DL (ref 31–37)
MCV RBC AUTO: 77.9 FL (ref 74–97)
MONOCYTES # BLD: 0.6 K/UL (ref 0.05–1.2)
MONOCYTES NFR BLD: 10 % (ref 3–10)
NEUTS SEG # BLD: 2.9 K/UL (ref 1.8–8)
NEUTS SEG NFR BLD: 50 % (ref 40–73)
P-R INTERVAL, ECG05: 160 MS
PLATELET # BLD AUTO: 172 K/UL (ref 135–420)
PMV BLD AUTO: 10.4 FL (ref 9.2–11.8)
POTASSIUM SERPL-SCNC: 4.2 MMOL/L (ref 3.5–5.5)
Q-T INTERVAL, ECG07: 420 MS
QRS DURATION, ECG06: 82 MS
QTC CALCULATION (BEZET), ECG08: 398 MS
RBC # BLD AUTO: 5.96 M/UL (ref 4.7–5.5)
SODIUM SERPL-SCNC: 140 MMOL/L (ref 136–145)
VENTRICULAR RATE, ECG03: 54 BPM
WBC # BLD AUTO: 5.8 K/UL (ref 4.6–13.2)

## 2018-03-12 PROCEDURE — 85025 COMPLETE CBC W/AUTO DIFF WBC: CPT

## 2018-03-12 PROCEDURE — 80048 BASIC METABOLIC PNL TOTAL CA: CPT

## 2018-03-12 PROCEDURE — 93005 ELECTROCARDIOGRAM TRACING: CPT

## 2018-03-12 PROCEDURE — 36415 COLL VENOUS BLD VENIPUNCTURE: CPT

## 2018-03-12 NOTE — TELEPHONE ENCOUNTER
LM to schedule a date for surgery and to bring him back in the office to fill out all surgery paperwork.

## 2018-03-19 ENCOUNTER — HOSPITAL ENCOUNTER (OUTPATIENT)
Dept: LAB | Age: 58
Discharge: HOME | End: 2018-03-19
Payer: MEDICARE

## 2018-03-19 PROCEDURE — 88304 TISSUE EXAM BY PATHOLOGIST: CPT

## 2018-03-19 PROCEDURE — 88305 TISSUE EXAM BY PATHOLOGIST: CPT

## 2018-03-26 DIAGNOSIS — Z76.0 MEDICATION REFILL: ICD-10-CM

## 2018-03-26 DIAGNOSIS — G89.29 OTHER CHRONIC PAIN: ICD-10-CM

## 2018-03-26 RX ORDER — OXYCODONE AND ACETAMINOPHEN 7.5; 325 MG/1; MG/1
TABLET ORAL
Qty: 30 TAB | Refills: 0 | Status: SHIPPED | OUTPATIENT
Start: 2018-03-26 | End: 2018-04-23 | Stop reason: SDUPTHER

## 2018-03-26 RX ORDER — NALOXONE HYDROCHLORIDE 4 MG/.1ML
SPRAY NASAL
Qty: 1 EACH | Refills: 1 | Status: SHIPPED | OUTPATIENT
Start: 2018-03-26 | End: 2018-06-25 | Stop reason: SDUPTHER

## 2018-03-26 NOTE — TELEPHONE ENCOUNTER
Printed rx for percocet:    Requested Prescriptions     Signed Prescriptions Disp Refills    oxyCODONE-acetaminophen (PERCOCET) 7.5-325 mg per tablet 30 Tab 0     Sig: Take one po daily prn pain     Authorizing Provider: AMANDA Granados    naloxone (NARCAN) 4 mg/actuation nasal spray 1 Each 1     Sig: Use 1 spray intranasally into 1 nostril. Use a new Narcan nasal spray for subsequent doses and administer into alternating nostrils. Authorizing Provider: Sherman Encinas     UDS: 2/22/18  Pain agreement signed on :4/24/17    Checked  and no aberrancies except needs to continue to get at the same pharmacy is percocet. Sent electronically narcan.

## 2018-04-02 ENCOUNTER — OFFICE VISIT (OUTPATIENT)
Dept: SURGERY | Age: 58
End: 2018-04-02

## 2018-04-02 VITALS
WEIGHT: 230 LBS | BODY MASS INDEX: 36.1 KG/M2 | TEMPERATURE: 97.6 F | SYSTOLIC BLOOD PRESSURE: 138 MMHG | HEART RATE: 65 BPM | DIASTOLIC BLOOD PRESSURE: 88 MMHG | HEIGHT: 67 IN | OXYGEN SATURATION: 97 % | RESPIRATION RATE: 18 BRPM

## 2018-04-02 DIAGNOSIS — L72.3 INFECTED SEBACEOUS CYST: Primary | ICD-10-CM

## 2018-04-02 DIAGNOSIS — L08.9 INFECTED SEBACEOUS CYST: Primary | ICD-10-CM

## 2018-04-02 NOTE — PROGRESS NOTES
Progress Note    Patient: Tip Cisneros  MRN: [id number]  SSN: [id number]   YOB: 1960  Age: 61 y.o. Sex: male     Chief Complaint   Patient presents with    Follow-up     wound check       HPI    Mr. Anil Barboza is a 48-year-old gentleman status post excision of a large sebaceous cyst of the midportion of his back. He is done great and his wounds well-healed.   Quite happy and will see him as needed from now on    Past Medical History:   Diagnosis Date    Advance directive discussed with patient 04/24/2017    Anxiety     Back injury 4/27/2007    Behavioral change     Chronic pain     CKD (chronic kidney disease) stage 2, GFR 60-89 ml/min 12/20/2013    Confusion     DDD (degenerative disc disease)     Depression     Diabetes mellitus, stable ([address]     Diabetic eye exam ([facility] [address] 09/09/2016    Dr. Balwinder Sims ( bilateral cataracts)    Difficulty walking     DJD (degenerative joint disease)     Fatigue     Generalized stiffness     H/O colonoscopy 12/2/2013    Bx: showed 2 tubular adenomas Done by Dr. Barfield Level    High cholesterol     Hypertension     Incoordination     Infected sebaceous cyst 5/4/2016    Insomnia     Joint pain     Lower extremity edema     Lymphedema     Muscle pain     Neuropathy     Osteoarthritis     Other and unspecified hyperlipidemia     Painful sexual intercourse     Poor concentration     Snoring     Type II or unspecified type diabetes mellitus without mention of complication, not stated as uncontrolled     Weakness      Past Surgical History:   Procedure Laterality Date    HX COLONOSCOPY      HX ORTHOPAEDIC      left elbow surgery    HX ORTHOPAEDIC Left 1/19/16    left Total Knee replacement    HX OTHER SURGICAL  02/2018    removal of abscess in back    TN DRAIN SKIN ABSCESS COMPLIC N/A 36/02/4429    Dr. Reuben Hunt     No Known Allergies  Current Outpatient Prescriptions   Medication Sig Dispense Refill    oxyCODONE-acetaminophen (PERCOCET) 7.5-325 mg per tablet Take one po daily prn pain 30 Tab 0    naloxone (NARCAN) 4 mg/actuation nasal spray Use 1 spray intranasally into 1 nostril. Use a new Narcan nasal spray for subsequent doses and administer into alternating nostrils. 1 Each 1    gabapentin (NEURONTIN) 300 mg capsule TAKE 2 CAPSULES BY MOUTH EVERY MORNING AND 2 CAPSULES EVERY EVENING 360 Cap 3    amLODIPine-benazepril (LOTREL) 5-20 mg per capsule TAKE ONE CAPSULE BY MOUTH ONCE DAILY 90 Cap 3    FREESTYLE LITE STRIPS strip Check fasting sugars daily before breakfast. BRAND MEDICALLY NECESSARY. FOR FREESTYLE LITE METER 100 Strip 11    Lancets misc Check fasting sugars daily before breakfast. BRAND MEDICALLY NECESSARY. FOR FREESTYLE LITE METER 100 Each 11    furosemide (LASIX) 20 mg tablet Take one po daily prn swelling 30 Tab 1    atorvastatin (LIPITOR) 10 mg tablet TAKE 1 TABLET BY MOUTH DAILY 90 Tab 3    metoprolol tartrate (LOPRESSOR) 50 mg tablet TAKE ONE TABLET BY MOUTH TWICE DAILY 180 Tab 3    FREESTYLE LITE METER monitoring kit Check fasting sugars daily before breakfast. BRAND MEDICALLY NECESSARY 1 Kit 0    Aspirin, Buffered 81 mg tab Take 81 mg by mouth daily.  cephALEXin (KEFLEX) 500 mg capsule Take 1 Cap by mouth three (3) times daily. 27 Cap 0     Social History     Social History    Marital status:      Spouse name: N/A    Number of children: N/A    Years of education: N/A     Occupational History    Not on file. Social History Main Topics    Smoking status: Never Smoker    Smokeless tobacco: Never Used      Comment: Pt counseled to continue to not smoke.     Alcohol use No    Drug use: No    Sexual activity: Yes     Partners: Female     Birth control/ protection: Condom     Other Topics Concern    Not on file     Social History Narrative     Family History   Problem Relation Age of Onset   [facility] Stroke Mother     Hypertension Mother     Seizures Mother     Diabetes Father     Heart Disease Father  Hypertension Father     Other Maternal Grandmother      tumor in abdomen         Review of systems:  Patient denies any reflux, emesis, abdominal pain, change in bowel habits, hematochezia, melena, fever, weight loss, fatigue chills, dermatitis, abnormal moles, change in vision, vertigo, epistaxis, dysphagia, hoarseness, chest pain, palpitations, hypertension, edema, cough, shortness of breath, wheezing, hemoptysis, snoring, hematuria, diabetes, thyroid disease, anemia, bruising, history of blood transfusion, dizziness, headache, or fainting. Physical Examination    Well developed well nourished male in no apparent distress  Visit Vitals    /88    Pulse 65    Temp 97.6 °F (36.4 °C) (Oral)    Resp 18    Ht 5' 7\" (1.702 m)    Wt 104.3 kg (230 lb)    SpO2 97%    BMI 36.02 kg/m2      Head: normocephalic, atraumatic  Mouth: Clear, no overt lesions, oral mucosa pink and moist  Neck: supple, no masses, no adenopathy or carotid bruits, trachea midline  Resp: clear to auscultation bilaterally, no wheeze, rhonchi or rales, excursions normal and symmetrical  Cardio: Regular rate and rhythm, no murmurs, clicks, gallops or rubs, no edema or varicosities  Abdomen: soft, nontender, nondistended, normoactive bowel sounds, no hernias, no hepatosplenomegaly,   Back: Healed surgical wound  Extremeties: warm, well-perfused, no tenderness or swelling, normal gait/station  Neuro: sensation and strength grossly intact and symmetrical  Psych: alert and oriented to person, place and time  Breast exam deferred    IMPRESSION  Status post excision large sebaceous cyst of the back    PLAN  No orders of the defined types were placed in this encounter.     Follow-up as needed  Leigh Cui MD

## 2018-04-02 NOTE — MR AVS SNAPSHOT
[address] [facility] [address] [phone] Patient: Diego Arrieta MRN: [id number] :1960 Visit Information Date & Time Provider Department Dept. Phone Encounter #  
 2018 11:15 AM Hui Brizuela MD [phone]  Your Appointments 2018  9:00 AM  
Office Visit with Monique Wyman MD  
[facility]) Appt Note: 3 mo f/u htn, dm, lipids; 3 mo f/u htn, dm, lipids [address]  
  
   
 [address] [facility] Upcoming Health Maintenance Date Due  
 EYE EXAM RETINAL OR DILATED Q1 2017 FOOT EXAM Q1 2018 MEDICARE YEARLY EXAM 2018 HEMOGLOBIN A1C Q6M 2018 COLONOSCOPY 2018 MICROALBUMIN Q1 2019 LIPID PANEL Q1 2019 DTaP/Tdap/Td series (2 - Td) 2027 Allergies as of 2018  Review Complete On: 2018 By: Lennox Mcmillan LPN No Known Allergies Current Immunizations  Reviewed on 10/24/2017 Name Date Influenza Vaccine 2014  9:28 AM, 2014  1:10 AM  
 Influenza Vaccine (Quad) PF 10/24/2017, 2017, 2015 Pneumococcal Polysaccharide (PPSV-23) 10/24/2017 Not reviewed this visit Vitals BP Pulse Temp Resp Height(growth percentile) Weight(growth percentile) 138/88 65 97.6 °F (36.4 °C) (Oral) 18 5' 7\" (1.702 m) 230 lb (104.3 kg) SpO2 BMI Smoking Status 97% 36.02 kg/m2 Never Smoker Vitals History BMI and BSA Data Body Mass Index Body Surface Area 36.02 kg/m 2 2.22 m 2 Preferred Pharmacy Pharmacy Name Phone [id number] [phone] [place] [address], [address] [place] [address] [phone] Your Updated Medication List  
  
   
 This list is accurate as of 4/2/18 11:44 AM.  Always use your most recent med list. amLODIPine-benazepril 5-20 mg per capsule Commonly known as:  LOTREL  
TAKE ONE CAPSULE BY MOUTH ONCE DAILY  
  
 aspirin, buffered 81 mg Tab Take 81 mg by mouth daily. atorvastatin 10 mg tablet Commonly known as:  LIPITOR  
TAKE 1 TABLET BY MOUTH DAILY  
  
 cephALEXin 500 mg capsule Commonly known as:  Estel Jamir Take 1 Cap by mouth three (3) times daily. FREESTYLE LITE METER monitoring kit Generic drug:  Blood-Glucose Meter Check fasting sugars daily before breakfast. BRAND MEDICALLY NECESSARY FREESTYLE LITE STRIPS strip Generic drug:  glucose blood VI test strips Check fasting sugars daily before breakfast. BRAND MEDICALLY NECESSARY. FOR FREESTYLE LITE METER  
  
 furosemide 20 mg tablet Commonly known as:  LASIX Take one po daily prn swelling  
  
 gabapentin 300 mg capsule Commonly known as:  NEURONTIN  
TAKE 2 CAPSULES BY MOUTH EVERY MORNING AND 2 CAPSULES EVERY EVENING Lancets Misc Check fasting sugars daily before breakfast. BRAND MEDICALLY NECESSARY. FOR FREESTYLE LITE METER  
  
 metoprolol tartrate 50 mg tablet Commonly known as:  LOPRESSOR  
TAKE ONE TABLET BY MOUTH TWICE DAILY  
  
 naloxone 4 mg/actuation nasal spray Commonly known as:  [employer] Use 1 spray intranasally into 1 nostril. Use a new Narcan nasal spray for subsequent doses and administer into alternating nostrils. oxyCODONE-acetaminophen 7.5-325 mg per tablet Commonly known as:  PERCOCET Take one po daily prn pain Please provide this summary of care documentation to your next provider. Your primary care clinician is listed as Juan Rodríguez. If you have any questions after today's visit, please call [phone].

## 2018-04-09 DIAGNOSIS — E78.00 PURE HYPERCHOLESTEROLEMIA: ICD-10-CM

## 2018-04-09 RX ORDER — ATORVASTATIN CALCIUM 10 MG/1
TABLET, FILM COATED ORAL
Qty: 90 TAB | Refills: 3 | Status: SHIPPED | OUTPATIENT
Start: 2018-04-09 | End: 2018-05-01 | Stop reason: SDUPTHER

## 2018-04-23 DIAGNOSIS — G89.29 OTHER CHRONIC PAIN: ICD-10-CM

## 2018-04-23 DIAGNOSIS — Z76.0 MEDICATION REFILL: ICD-10-CM

## 2018-04-23 NOTE — TELEPHONE ENCOUNTER
printed and placed in PCP medication refill review folder.      Last UDS date: 02/22/2018  Pain contract signed: 04/24/2017  Next Appt: 05/22/2018  Last Appt:025/22/2018

## 2018-04-23 NOTE — TELEPHONE ENCOUNTER
Patient request, last OV 2/22/18, next scheduled 5/22/18.     Requested Prescriptions     Pending Prescriptions Disp Refills    oxyCODONE-acetaminophen (PERCOCET) 7.5-325 mg per tablet 30 Tab 0     Sig: Take one po daily prn pain

## 2018-04-24 RX ORDER — OXYCODONE AND ACETAMINOPHEN 7.5; 325 MG/1; MG/1
TABLET ORAL
Qty: 30 TAB | Refills: 0 | Status: SHIPPED | OUTPATIENT
Start: 2018-04-24 | End: 2018-05-22 | Stop reason: SDUPTHER

## 2018-04-24 NOTE — TELEPHONE ENCOUNTER
UDS reviewed   reviewed and appropriate activity noted. No adverse activity noted.   Refill this time

## 2018-05-01 DIAGNOSIS — E78.00 PURE HYPERCHOLESTEROLEMIA: ICD-10-CM

## 2018-05-01 RX ORDER — ATORVASTATIN CALCIUM 10 MG/1
TABLET, FILM COATED ORAL
Qty: 90 TAB | Refills: 3 | Status: SHIPPED | OUTPATIENT
Start: 2018-05-01

## 2018-05-14 ENCOUNTER — OFFICE VISIT (OUTPATIENT)
Dept: SURGERY | Age: 58
End: 2018-05-14

## 2018-05-14 VITALS
RESPIRATION RATE: 18 BRPM | OXYGEN SATURATION: 99 % | HEIGHT: 67 IN | SYSTOLIC BLOOD PRESSURE: 138 MMHG | DIASTOLIC BLOOD PRESSURE: 86 MMHG | TEMPERATURE: 97.9 F | HEART RATE: 61 BPM | BODY MASS INDEX: 36.1 KG/M2 | WEIGHT: 230 LBS

## 2018-05-14 DIAGNOSIS — L72.9 INFECTED CYST OF SKIN: Primary | ICD-10-CM

## 2018-05-14 DIAGNOSIS — L08.9 INFECTED CYST OF SKIN: Primary | ICD-10-CM

## 2018-05-14 PROBLEM — E66.01 SEVERE OBESITY (BMI 35.0-39.9) WITH COMORBIDITY (HCC): Status: ACTIVE | Noted: 2018-05-14

## 2018-05-14 RX ORDER — CEPHALEXIN 500 MG/1
500 CAPSULE ORAL 3 TIMES DAILY
Qty: 30 CAP | Refills: 0 | Status: SHIPPED | OUTPATIENT
Start: 2018-05-14 | End: 2018-11-15 | Stop reason: ALTCHOICE

## 2018-05-14 NOTE — PATIENT INSTRUCTIONS

## 2018-05-14 NOTE — MR AVS SNAPSHOT
[address] [facility] 
 
 
 [address] [facility] [address] 
[phone] Patient: Diego Arrieta MRN: [id number] :1960 Visit Information Date & Time Provider Department Dept. Phone Encounter #  
 2018  9:00 AM Sourav Coelho NP [facility] [phone] Your Appointments 2018 11:00 AM  
Follow Up with Hui Brizuela MD  
[address]) Appt Note: 1 week fu boil [address] [facility] [address]  
[phone]  
  
   
 [address] [facility] [address] 2018  9:00 AM  
Office Visit with Monique Wyman MD  
[address] [place] [address]) Appt Note: 3 mo f/u htn, dm, lipids; 3 mo f/u htn, dm, lipids [address] [facility] [address]  
  
   
 [address] [facility] Upcoming Health Maintenance Date Due  
 EYE EXAM RETINAL OR DILATED Q1 2017 FOOT EXAM Q1 2018 MEDICARE YEARLY EXAM 2018 Influenza Age 5 to Adult 2018 HEMOGLOBIN A1C Q6M 2018 COLONOSCOPY 2018 MICROALBUMIN Q1 2019 LIPID PANEL Q1 2019 DTaP/Tdap/Td series (2 - Td) 2027 Allergies as of 2018  Review Complete On: 2018 By: Sourav Coelho NP No Known Allergies Current Immunizations  Reviewed on 10/24/2017 Name Date Influenza Vaccine 2014  9:28 AM, 2014  1:10 AM  
 Influenza Vaccine (Quad) PF 10/24/2017, 2017, 2015 Pneumococcal Polysaccharide (PPSV-23) 10/24/2017 Not reviewed this visit Vitals BP Pulse Temp Resp Height(growth percentile) Weight(growth percentile) 138/86 61 97.9 °F (36.6 °C) 18 5' 7\" (1.702 m) 230 lb (104.3 kg) SpO2 BMI Smoking Status 99% 36.02 kg/m2 Never Smoker Vitals History BMI and BSA Data Body Mass Index Body Surface Area 36.02 kg/m 2 2.22 m 2 Preferred Pharmacy Pharmacy Name Phone [id number], [address] [facility] [address] [facility] [phone] Your Updated Medication List  
  
   
This list is accurate as of 5/14/18  9:22 AM.  Always use your most recent med list. amLODIPine-benazepril 5-20 mg per capsule Commonly known as:  LOTREL  
TAKE ONE CAPSULE BY MOUTH ONCE DAILY  
  
 aspirin, buffered 81 mg Tab Take 81 mg by mouth daily. atorvastatin 10 mg tablet Commonly known as:  LIPITOR  
TAKE 1 TABLET BY MOUTH DAILY  
  
 cephALEXin 500 mg capsule Commonly known as:  Samantha Salvage Take 1 Cap by mouth three (3) times daily. FREESTYLE LITE METER monitoring kit Generic drug:  Blood-Glucose Meter Check fasting sugars daily before breakfast. BRAND MEDICALLY NECESSARY FREESTYLE LITE STRIPS strip Generic drug:  glucose blood VI test strips Check fasting sugars daily before breakfast. BRAND MEDICALLY NECESSARY. FOR FREESTYLE LITE METER  
  
 furosemide 20 mg tablet Commonly known as:  LASIX Take one po daily prn swelling  
  
 gabapentin 300 mg capsule Commonly known as:  NEURONTIN  
TAKE 2 CAPSULES BY MOUTH EVERY MORNING AND 2 CAPSULES EVERY EVENING Lancets Misc Check fasting sugars daily before breakfast. BRAND MEDICALLY NECESSARY. FOR FREESTYLE LITE METER  
  
 metoprolol tartrate 50 mg tablet Commonly known as:  LOPRESSOR  
TAKE 1 TABLET BY MOUTH TWICE A DAY  
  
 naloxone 4 mg/actuation nasal spray Commonly known as:  [employer] Use 1 spray intranasally into 1 nostril. Use a new Narcan nasal spray for subsequent doses and administer into alternating nostrils. oxyCODONE-acetaminophen 7.5-325 mg per tablet Commonly known as:  PERCOCET Take one po daily prn pain Please provide this summary of care documentation to your next provider. Your primary care clinician is listed as Juan Rodríguez. If you have any questions after today's visit, please call [phone].

## 2018-05-14 NOTE — PROGRESS NOTES
Lorraine Pereyra. Chau [place], PEDRO PABLO-C  PROGRESS NOTE        Subjective:  Mr. Amanda Brown is a very pleasant 60 yo AA male who presents today about a month after his last post op visit following excision of sebaceous cyst to his back. He states that on Friday of last week, it got hot and swollen and on Sunday it \"busted\". His wife states that he had a fever the first day, but none since. She has been keeping clean gauze on it and she describes the drainage as pink to red. She denies any odor to the drainage and denies it being green or yellow. Objective:  Vitals:    05/14/18 0900   BP: 138/86   Pulse: 61   Resp: 18   Temp: 97.9 °F (36.6 °C)   SpO2: 99%   Weight: 104.3 kg (230 lb)   Height: 5' 7\" (1.702 m)       Physical Exam:    General: in no apparent distress, alert, oriented times 3, afebrile and cooperative   Incision:  Right end of surgical incision is open superficially approximately 0.5cm. It has pink wound bed and I can only drain clear, serous fluid with manipulation. Skin is warm. Tender to manipulation. Incision has not dehisced. Area cleaned, bacitracin applied, covered with sterile 4x4 and secured with paper tape. Current Medications:  Current Outpatient Prescriptions   Medication Sig Dispense Refill    cephALEXin (KEFLEX) 500 mg capsule Take 1 Cap by mouth three (3) times daily.  Indications: Skin and Skin Structure Infection 30 Cap 0    atorvastatin (LIPITOR) 10 mg tablet TAKE 1 TABLET BY MOUTH DAILY 90 Tab 3    metoprolol tartrate (LOPRESSOR) 50 mg tablet TAKE 1 TABLET BY MOUTH TWICE A  Tab 1    oxyCODONE-acetaminophen (PERCOCET) 7.5-325 mg per tablet Take one po daily prn pain 30 Tab 0    gabapentin (NEURONTIN) 300 mg capsule TAKE 2 CAPSULES BY MOUTH EVERY MORNING AND 2 CAPSULES EVERY EVENING 360 Cap 3    amLODIPine-benazepril (LOTREL) 5-20 mg per capsule TAKE ONE CAPSULE BY MOUTH ONCE DAILY 90 Cap 3    FREESTYLE LITE STRIPS strip Check fasting sugars daily before breakfast. BRAND MEDICALLY NECESSARY. FOR FREESTYLE LITE METER 100 Strip 11    Lancets misc Check fasting sugars daily before breakfast. BRAND MEDICALLY NECESSARY. FOR FREESTYLE LITE METER 100 Each 11    furosemide (LASIX) 20 mg tablet Take one po daily prn swelling 30 Tab 1    FREESTYLE LITE METER monitoring kit Check fasting sugars daily before breakfast. BRAND MEDICALLY NECESSARY 1 Kit 0    Aspirin, Buffered 81 mg tab Take 81 mg by mouth daily.  naloxone (NARCAN) 4 mg/actuation nasal spray Use 1 spray intranasally into 1 nostril. Use a new Narcan nasal spray for subsequent doses and administer into alternating nostrils. 1 Each 1       Chart and notes reviewed. Data reviewed. I have evaluated and examined the patient. Impression:    · Patient with a history of infected sebaceous cyst on back S/P surgical excision on March 19, here today with infection in same area of back. Plan:  · Keflex   · Keep clean bandages on area of drainage  ·  follow up in one week    Mr. Mart Ramos has a reminder for a \"due or due soon\" health maintenance. I have asked that he contact his primary care provider for follow-up on this health maintenance. Silvia Mitchell.  PEDRO PABLO [place]

## 2018-05-21 ENCOUNTER — OFFICE VISIT (OUTPATIENT)
Dept: SURGERY | Age: 58
End: 2018-05-21

## 2018-05-21 VITALS
TEMPERATURE: 98.3 F | BODY MASS INDEX: 36.1 KG/M2 | RESPIRATION RATE: 16 BRPM | HEART RATE: 64 BPM | OXYGEN SATURATION: 97 % | HEIGHT: 67 IN | WEIGHT: 230 LBS

## 2018-05-21 DIAGNOSIS — L02.212 BACK ABSCESS: Primary | ICD-10-CM

## 2018-05-21 NOTE — MR AVS SNAPSHOT
[facility]in [facility] 
 
 
 [address] [facility] [address] [facility] [address] 
[phone] Patient: Jairo Rashid MRN: [id number] :1960 Visit Information Date & Time Provider Department Dept. Phone Encounter #  
 2018 11:00 AM Isaak Goodman MD [facility] [phone] Your Appointments 2018 11:00 AM  
Follow Up with Isaak Goodman MD  
[address] [facility]) Appt Note: 1 week fu boil [address] [facility] [address] [facility] [address]  
[phone]  
  
   
 [address] [facility] [address] [facility] [address] 2018  9:00 AM  
Office Visit with Adam Simpson MD  
[facility] [address] [place] [address]) Appt Note: 3 mo f/u htn, dm, lipids; 3 mo f/u htn, dm, lipids [address] [facility]  
  
   
 [address] [facility]  
  
    
 2018  9:45 AM  
Follow Up with Isaak Goodman MD  
[address] [facility]) Appt Note: 2 week fu  
 [address] [facility] [address] [facility] [address] [phone] Upcoming Health Maintenance Date Due  
 EYE EXAM RETINAL OR DILATED Q1 2017 FOOT EXAM Q1 2018 MEDICARE YEARLY EXAM 2018 Influenza Age 5 to Adult 2018 HEMOGLOBIN A1C Q6M 2018 COLONOSCOPY 2018 MICROALBUMIN Q1 2019 LIPID PANEL Q1 2019 DTaP/Tdap/Td series (2 - Td) 2027 Allergies as of 2018  Review Complete On: 2018 By: Tiffanie Coley LPN No Known Allergies Current Immunizations  Reviewed on 10/24/2017 Name Date Influenza Vaccine 2014  9:28 AM, 2014  1:10 AM  
 Influenza Vaccine (Quad) PF 10/24/2017, 2017, 2015 Pneumococcal Polysaccharide (PPSV-23) 10/24/2017 Not reviewed this visit Vitals Pulse Temp Resp Height(growth percentile) Weight(growth percentile) SpO2  
 64 98.3 °F (36.8 °C) (Oral) 16 5' 7\" (1.702 m) 230 lb (104.3 kg) 97% BMI Smoking Status 36.02 kg/m2 Never Smoker Vitals History BMI and BSA Data Body Mass Index Body Surface Area 36.02 kg/m 2 2.22 m 2 Preferred Pharmacy Pharmacy Name Phone [id number]- [phone], [address] [place] [address] [phone] Your Updated Medication List  
  
   
This list is accurate as of 5/21/18 10:54 AM.  Always use your most recent med list. amLODIPine-benazepril 5-20 mg per capsule Commonly known as:  LOTREL  
TAKE ONE CAPSULE BY MOUTH ONCE DAILY  
  
 aspirin, buffered 81 mg Tab Take 81 mg by mouth daily. atorvastatin 10 mg tablet Commonly known as:  LIPITOR  
TAKE 1 TABLET BY MOUTH DAILY  
  
 cephALEXin 500 mg capsule Commonly known as:  Sharonda Bees Take 1 Cap by mouth three (3) times daily. Indications: Skin and Skin Structure Infection FREESTYLE LITE METER monitoring kit Generic drug:  Blood-Glucose Meter Check fasting sugars daily before breakfast. BRAND MEDICALLY NECESSARY FREESTYLE LITE STRIPS strip Generic drug:  glucose blood VI test strips Check fasting sugars daily before breakfast. BRAND MEDICALLY NECESSARY. FOR FREESTYLE LITE METER  
  
 furosemide 20 mg tablet Commonly known as:  LASIX Take one po daily prn swelling  
  
 gabapentin 300 mg capsule Commonly known as:  NEURONTIN  
TAKE 2 CAPSULES BY MOUTH EVERY MORNING AND 2 CAPSULES EVERY EVENING Lancets Misc Check fasting sugars daily before breakfast. BRAND MEDICALLY NECESSARY. FOR FREESTYLE LITE METER  
  
 metoprolol tartrate 50 mg tablet Commonly known as:  LOPRESSOR  
TAKE 1 TABLET BY MOUTH TWICE A DAY  
  
 naloxone 4 mg/actuation nasal spray Commonly known as:  [employer] Use 1 spray intranasally into 1 nostril.  Use a new Narcan nasal spray for subsequent doses and administer into alternating nostrils. oxyCODONE-acetaminophen 7.5-325 mg per tablet Commonly known as:  PERCOCET Take one po daily prn pain Please provide this summary of care documentation to your next provider. Your primary care clinician is listed as Juan Rodríguez. If you have any questions after today's visit, please call [phone].

## 2018-05-21 NOTE — PROGRESS NOTES
Progress Note    Patient: Marci Hidalgo  MRN: [id number]  SSN: [id number]   YOB: 1960  Age: 61 y.o. Sex: male     Chief Complaint   Patient presents with    Follow-up     cyst on back       HPI    Mr. Glendy Medina returns after his abscess drainage with ongoing mild inflammation. He has been on antibiotics now for a week and looks better. We are going to hold off for any incision and drainage now but I have discussed with him it could come to that. We will see him back after he finishes his antibiotics in a couple weeks.     Past Medical History:   Diagnosis Date    Advance directive discussed with patient 04/24/2017    Anxiety     Back injury 4/27/2007    Behavioral change     Chronic pain     CKD (chronic kidney disease) stage 2, GFR 60-89 ml/min 12/20/2013    Confusion     DDD (degenerative disc disease)     Depression     Diabetes mellitus, stable ([address]     Diabetic eye exam ([facility] 09/09/2016    Dr. Kristin Newton ( bilateral cataracts)    Difficulty walking     DJD (degenerative joint disease)     Fatigue     Generalized stiffness     H/O colonoscopy 12/2/2013    Bx: showed 2 tubular adenomas Done by Dr. George Ybarra    High cholesterol     Hypertension     Incoordination     Infected sebaceous cyst 5/4/2016    Insomnia     Joint pain     Lower extremity edema     Lymphedema     Muscle pain     Neuropathy     Osteoarthritis     Other and unspecified hyperlipidemia     Painful sexual intercourse     Poor concentration     Snoring     Type II or unspecified type diabetes mellitus without mention of complication, not stated as uncontrolled     Weakness      Past Surgical History:   Procedure Laterality Date    HX COLONOSCOPY      HX ORTHOPAEDIC      left elbow surgery    HX ORTHOPAEDIC Left 1/19/16    left Total Knee replacement    HX OTHER SURGICAL  02/2018    removal of abscess in back    AR DRAIN SKIN ABSCESS COMPLIC N/A 95/33/4723    Dr. Adelina Soliz     No Known Allergies  Current Outpatient Prescriptions   Medication Sig Dispense Refill    cephALEXin (KEFLEX) 500 mg capsule Take 1 Cap by mouth three (3) times daily. Indications: Skin and Skin Structure Infection 30 Cap 0    atorvastatin (LIPITOR) 10 mg tablet TAKE 1 TABLET BY MOUTH DAILY 90 Tab 3    metoprolol tartrate (LOPRESSOR) 50 mg tablet TAKE 1 TABLET BY MOUTH TWICE A  Tab 1    oxyCODONE-acetaminophen (PERCOCET) 7.5-325 mg per tablet Take one po daily prn pain 30 Tab 0    gabapentin (NEURONTIN) 300 mg capsule TAKE 2 CAPSULES BY MOUTH EVERY MORNING AND 2 CAPSULES EVERY EVENING 360 Cap 3    amLODIPine-benazepril (LOTREL) 5-20 mg per capsule TAKE ONE CAPSULE BY MOUTH ONCE DAILY 90 Cap 3    FREESTYLE LITE STRIPS strip Check fasting sugars daily before breakfast. BRAND MEDICALLY NECESSARY. FOR FREESTYLE LITE METER 100 Strip 11    Lancets misc Check fasting sugars daily before breakfast. BRAND MEDICALLY NECESSARY. FOR FREESTYLE LITE METER 100 Each 11    furosemide (LASIX) 20 mg tablet Take one po daily prn swelling 30 Tab 1    FREESTYLE LITE METER monitoring kit Check fasting sugars daily before breakfast. BRAND MEDICALLY NECESSARY 1 Kit 0    Aspirin, Buffered 81 mg tab Take 81 mg by mouth daily.  naloxone (NARCAN) 4 mg/actuation nasal spray Use 1 spray intranasally into 1 nostril. Use a new Narcan nasal spray for subsequent doses and administer into alternating nostrils. 1 Each 1     Social History     Social History    Marital status:      Spouse name: N/A    Number of children: N/A    Years of education: N/A     Occupational History    Not on file. Social History Main Topics    Smoking status: Never Smoker    Smokeless tobacco: Never Used      Comment: Pt counseled to continue to not smoke.     Alcohol use No    Drug use: No    Sexual activity: Yes     Partners: Female     Birth control/ protection: Condom     Other Topics Concern    Not on file     Social History Narrative Family History   Problem Relation Age of Onset   [address] Stroke Mother     Hypertension Mother     Seizures Mother     Diabetes Father     Heart Disease Father     Hypertension Father     Other Maternal Grandmother      tumor in abdomen         Review of systems:  Patient denies any reflux, emesis, abdominal pain, change in bowel habits, hematochezia, melena, fever, weight loss, fatigue chills, dermatitis, abnormal moles, change in vision, vertigo, epistaxis, dysphagia, hoarseness, chest pain, palpitations, hypertension, edema, cough, shortness of breath, wheezing, hemoptysis, snoring, hematuria, diabetes, thyroid disease, anemia, bruising, history of blood transfusion, dizziness, headache, or fainting. Physical Examination    Well developed well nourished male in no apparent distress  Visit Vitals    Pulse 64    Temp 98.3 °F (36.8 °C) (Oral)    Resp 16    Ht 5' 7\" (1.702 m)    Wt 104.3 kg (230 lb)    SpO2 97%    BMI 36.02 kg/m2      Head: normocephalic, atraumatic  Mouth: Clear, no overt lesions, oral mucosa pink and moist  Neck: supple, no masses, no adenopathy or carotid bruits, trachea midline  Resp: clear to auscultation bilaterally, no wheeze, rhonchi or rales, excursions normal and symmetrical  Cardio: Regular rate and rhythm, no murmurs, clicks, gallops or rubs, no edema or varicosities  Abdomen: soft, nontender, nondistended, normoactive bowel sounds, no hernias, no hepatosplenomegaly,   Back: Deferred  Extremeties: warm, well-perfused, no tenderness or swelling, normal gait/station  Neuro: sensation and strength grossly intact and symmetrical  Psych: alert and oriented to person, place and time  Breast exam deferred    IMPRESSION  Back abscess improved    PLAN  No orders of the defined types were placed in this encounter.     Follow up in 2 weeks  Med Jordan MD

## 2018-05-22 ENCOUNTER — OFFICE VISIT (OUTPATIENT)
Dept: INTERNAL MEDICINE CLINIC | Age: 58
End: 2018-05-22

## 2018-05-22 ENCOUNTER — HOSPITAL ENCOUNTER (OUTPATIENT)
Dept: LAB | Age: 58
Discharge: HOME | End: 2018-05-22
Payer: MEDICARE

## 2018-05-22 VITALS
RESPIRATION RATE: 16 BRPM | OXYGEN SATURATION: 100 % | HEIGHT: 67 IN | HEART RATE: 52 BPM | TEMPERATURE: 97 F | DIASTOLIC BLOOD PRESSURE: 89 MMHG | WEIGHT: 235.8 LBS | SYSTOLIC BLOOD PRESSURE: 137 MMHG | BODY MASS INDEX: 37.01 KG/M2

## 2018-05-22 DIAGNOSIS — I10 HYPERTENSION, UNSPECIFIED TYPE: ICD-10-CM

## 2018-05-22 DIAGNOSIS — G89.29 OTHER CHRONIC PAIN: ICD-10-CM

## 2018-05-22 DIAGNOSIS — E11.9 TYPE 2 DIABETES MELLITUS WITHOUT COMPLICATION, WITHOUT LONG-TERM CURRENT USE OF INSULIN (HCC): ICD-10-CM

## 2018-05-22 DIAGNOSIS — Z76.0 MEDICATION REFILL: ICD-10-CM

## 2018-05-22 DIAGNOSIS — E11.9 TYPE 2 DIABETES MELLITUS WITHOUT COMPLICATION, WITHOUT LONG-TERM CURRENT USE OF INSULIN (HCC): Primary | ICD-10-CM

## 2018-05-22 LAB
ALBUMIN SERPL-MCNC: 3.8 G/DL (ref 3.4–5)
ALBUMIN/GLOB SERPL: 1 {RATIO} (ref 0.8–1.7)
ALP SERPL-CCNC: 111 U/L (ref 45–117)
ALT SERPL-CCNC: 54 U/L (ref 16–61)
ANION GAP SERPL CALC-SCNC: 9 MMOL/L (ref 3–18)
AST SERPL-CCNC: 24 U/L (ref 15–37)
BILIRUB SERPL-MCNC: 0.5 MG/DL (ref 0.2–1)
BUN SERPL-MCNC: 17 MG/DL (ref 7–18)
BUN/CREAT SERPL: 14 (ref 12–20)
CALCIUM SERPL-MCNC: 8.8 MG/DL (ref 8.5–10.1)
CHLORIDE SERPL-SCNC: 101 MMOL/L (ref 100–108)
CO2 SERPL-SCNC: 28 MMOL/L (ref 21–32)
CREAT SERPL-MCNC: 1.18 MG/DL (ref 0.6–1.3)
EST. AVERAGE GLUCOSE BLD GHB EST-MCNC: 151 MG/DL
GLOBULIN SER CALC-MCNC: 3.7 G/DL (ref 2–4)
GLUCOSE SERPL-MCNC: 148 MG/DL (ref 74–99)
HBA1C MFR BLD: 6.9 % (ref 4.2–5.6)
POTASSIUM SERPL-SCNC: 3.9 MMOL/L (ref 3.5–5.5)
PROT SERPL-MCNC: 7.5 G/DL (ref 6.4–8.2)
SODIUM SERPL-SCNC: 138 MMOL/L (ref 136–145)
TSH SERPL DL<=0.05 MIU/L-ACNC: 1.04 UIU/ML (ref 0.36–3.74)

## 2018-05-22 PROCEDURE — 80053 COMPREHEN METABOLIC PANEL: CPT | Performed by: FAMILY MEDICINE

## 2018-05-22 PROCEDURE — 36415 COLL VENOUS BLD VENIPUNCTURE: CPT | Performed by: FAMILY MEDICINE

## 2018-05-22 PROCEDURE — 84443 ASSAY THYROID STIM HORMONE: CPT | Performed by: FAMILY MEDICINE

## 2018-05-22 PROCEDURE — 83036 HEMOGLOBIN GLYCOSYLATED A1C: CPT | Performed by: FAMILY MEDICINE

## 2018-05-22 PROCEDURE — G0480 DRUG TEST DEF 1-7 CLASSES: HCPCS | Performed by: FAMILY MEDICINE

## 2018-05-22 RX ORDER — OXYCODONE AND ACETAMINOPHEN 7.5; 325 MG/1; MG/1
TABLET ORAL
Qty: 30 TAB | Refills: 0 | Status: SHIPPED | OUTPATIENT
Start: 2018-05-22 | End: 2018-06-22 | Stop reason: SDUPTHER

## 2018-05-22 RX ORDER — AMLODIPINE AND BENAZEPRIL HYDROCHLORIDE 5; 20 MG/1; MG/1
CAPSULE ORAL
Qty: 90 CAP | Refills: 3 | Status: SHIPPED | OUTPATIENT
Start: 2018-05-22

## 2018-05-22 NOTE — PROGRESS NOTES
FAMILY MEDICINE CLINIC NOTE    S: The patient presents for follow up on DM2, HTN, HLD and chronic pain. He needs a refill of percocet for chronic pain in the lumbar spine and bilateral knees. He is normally prescribed percocet by his PCP who is currently out on leave. Patient is diabetic, currently diet controlled, wants to continue with lifestyle modification efforts. Lab Results   Component Value Date/Time    Hemoglobin A1c 7.0 (H) 02/22/2018 09:34 AM    Hemoglobin A1c (POC) 6.1 12/20/2013 11:29 AM           Current Outpatient Prescriptions on File Prior to Visit   Medication Sig Dispense Refill    cephALEXin (KEFLEX) 500 mg capsule Take 1 Cap by mouth three (3) times daily. Indications: Skin and Skin Structure Infection 30 Cap 0    atorvastatin (LIPITOR) 10 mg tablet TAKE 1 TABLET BY MOUTH DAILY 90 Tab 3    metoprolol tartrate (LOPRESSOR) 50 mg tablet TAKE 1 TABLET BY MOUTH TWICE A  Tab 1    gabapentin (NEURONTIN) 300 mg capsule TAKE 2 CAPSULES BY MOUTH EVERY MORNING AND 2 CAPSULES EVERY EVENING 360 Cap 3    FREESTYLE LITE STRIPS strip Check fasting sugars daily before breakfast. BRAND MEDICALLY NECESSARY. FOR FREESTYLE LITE METER 100 Strip 11    Lancets misc Check fasting sugars daily before breakfast. BRAND MEDICALLY NECESSARY. FOR FREESTYLE LITE METER 100 Each 11    furosemide (LASIX) 20 mg tablet Take one po daily prn swelling 30 Tab 1    FREESTYLE LITE METER monitoring kit Check fasting sugars daily before breakfast. BRAND MEDICALLY NECESSARY 1 Kit 0    Aspirin, Buffered 81 mg tab Take 81 mg by mouth daily.  naloxone (NARCAN) 4 mg/actuation nasal spray Use 1 spray intranasally into 1 nostril. Use a new Narcan nasal spray for subsequent doses and administer into alternating nostrils. 1 Each 1     No current facility-administered medications on file prior to visit.         Past Medical History:   Diagnosis Date    Advance directive discussed with patient 04/24/2017   [address] [place] [address]     Back injury 4/27/2007    Behavioral change     Chronic pain     CKD (chronic kidney disease) stage 2, GFR 60-89 ml/min 12/20/2013    Confusion     DDD (degenerative disc disease)     Depression     Diabetes mellitus, [facility] ([facility]     Diabetic eye exam ([facility] [facility] 09/09/2016    Dr. Alisha Valente ( bilateral cataracts)    Difficulty walking     DJD (degenerative joint disease)     Fatigue     Generalized stiffness     H/O colonoscopy 12/2/2013    Bx: showed 2 tubular adenomas Done by Dr. Nicholas Faustin    High cholesterol     Hypertension     Incoordination     Infected sebaceous cyst 5/4/2016    Insomnia     Joint pain     Lower extremity edema     Lymphedema     Muscle pain     Neuropathy     Osteoarthritis     Other and unspecified hyperlipidemia     Painful sexual intercourse     Poor concentration     Snoring     Type II or unspecified type diabetes mellitus without mention of complication, not stated as uncontrolled     Weakness        Social History     Social History    Marital status:      Spouse name: N/A    Number of children: N/A    Years of education: N/A     Occupational History    Not on file. Social History Main Topics    Smoking status: Never Smoker    Smokeless tobacco: Never Used      Comment: Pt counseled to continue to not smoke.     Alcohol use No    Drug use: No    Sexual activity: Yes     Partners: Female     Birth control/ protection: Condom     Other Topics Concern    Not on file     Social History Narrative       Family History   Problem Relation Age of Onset   [employer] Stroke Mother     Hypertension Mother     Seizures Mother     Diabetes Father     Heart Disease Father     Hypertension Father     Other Maternal Grandmother      tumor in abdomen       O:  Visit Vitals    /89 (BP 1 Location: Left arm, BP Patient Position: Sitting)    Pulse (!) 52    Temp 97 °F (36.1 °C) (Oral)    Resp 16    Ht 5' 7\" (1.702 m)    Wt 235 lb 12.8 oz (107 kg)  SpO2 100%    BMI 36.93 kg/m2     NAD, comfortable  No thyromegaly  RRR, no murmurs  CTABL, no wheezing/ronchi/rales  abd soft ND NT normoactive BS   No edema    61 y.o. male      ICD-10-CM ICD-9-CM    1. Type 2 diabetes mellitus without complication, without long-term current use of insulin (HCC) E11.9 250.00 TSH 3RD GENERATION      HEMOGLOBIN A1C WITH EAG      METABOLIC PANEL, COMPREHENSIVE   2. Medication refill Z76.0 V68.1 oxyCODONE-acetaminophen (PERCOCET) 7.5-325 mg per tablet      amLODIPine-benazepril (LOTREL) 5-20 mg per capsule   3. Other chronic pain G89.29 338.29 oxyCODONE-acetaminophen (PERCOCET) 7.5-325 mg per tablet      TOXASSURE SELECT 13 (MW)   4.  Hypertension, unspecified type I10 401.9 TSH 3RD GENERATION      METABOLIC PANEL, COMPREHENSIVE

## 2018-05-22 NOTE — PROGRESS NOTES
Paperwork completed and faxed.   ROOM # [id number]    Edith Cason presents today for   Chief Complaint   Patient presents with    Hypertension     3 month f/u    Diabetes     3 month f/u    Cholesterol Problem     3 month f/u    Medication Refill     Requested Prescriptions     Pending Prescriptions Disp Refills    oxyCODONE-acetaminophen (PERCOCET) 7.5-325 mg per tablet 30 Tab 0     Sig: Take one po daily prn pain    amLODIPine-benazepril (LOTREL) 5-20 mg per capsule 90 Cap 3     Sig: TAKE ONE CAPSULE BY MOUTH ONCE DAILY         Edith Cason preferred language for health care discussion is english/other. Is someone accompanying this pt? no    Is the patient using any DME equipment during OV? no    Depression Screening:  PHQ over the last two weeks 5/22/2018 2/22/2018 10/24/2017 7/24/2017 4/24/2017 9/22/2016 3/23/2016   Little interest or pleasure in doing things Not at all Not at all Not at all Not at all Not at all Not at all Not at all   Feeling down, depressed or hopeless Not at all Not at all Not at all Not at all Not at all Not at all Not at all   Total Score PHQ 2 0 0 0 0 0 0 0       Learning Assessment:  Learning Assessment 2/14/2018 1/14/2015 12/20/2013   PRIMARY LEARNER Patient Patient Patient   HIGHEST LEVEL OF EDUCATION - PRIMARY LEARNER  - DID NOT GRADUATE HIGH SCHOOL DID NOT GRADUATE [address]    NEED - - No   LEARNER PREFERENCE PRIMARY DEMONSTRATION [facility] - - no   ANSWERED BY self patient patient   RELATIONSHIP SELF SELF SELF       Abuse Screening:  Abuse Screening Questionnaire 10/24/2017   Do you ever feel afraid of your partner? N   Are you in a relationship with someone who physically or mentally threatens you? N   Is it safe for you to go home? Y       Fall Risk  No flowsheet data found. Health Maintenance reviewed and discussed per provider.  Yes    Edith Cason is due for   Health Maintenance Due   Topic Date Due    EYE EXAM RETINAL OR DILATED Q1  09/09/2017    FOOT EXAM Q1  01/25/2018    MEDICARE YEARLY EXAM  04/25/2018     Please order/place referral if appropriate. Advance Directive:  1. Do you have an advance directive in place? Patient Reply: no    2. If not, would you like material regarding how to put one in place? Patient Reply: no    Coordination of Care:  1. Have you been to the ER, urgent care clinic since your last visit? Hospitalized since your last visit? no    2. Have you seen or consulted any other health care providers outside of the [address] [place] [address] since your last visit? Include any pap smears or colon screening.  no

## 2018-05-22 NOTE — MR AVS SNAPSHOT
[facility] 
 
 
 [address] [facility] [id number] 
[phone] Patient: Sharmin Cortes MRN: [id number] :1960 Visit Information Date & Time Provider Department Dept. Phone Encounter #  
 2018  9:15 AM Asa Malik [address] [phone] [id number] Your Appointments 2018  9:45 AM  
Follow Up with Jimy Garcia MD  
[address]) Appt Note: 2 week fu  
 [address] [facility] [address] [facility] [address]  
[phone]  
  
   
 [address] [facility] [address] [address] Upcoming Health Maintenance Date Due  
 EYE EXAM RETINAL OR DILATED Q1 2017 FOOT EXAM Q1 2018 MEDICARE YEARLY EXAM 2018 Influenza Age 5 to Adult 2018 HEMOGLOBIN A1C Q6M 2018 COLONOSCOPY 2018 MICROALBUMIN Q1 2019 LIPID PANEL Q1 2019 DTaP/Tdap/Td series (2 - Td) 2027 Allergies as of 2018  Review Complete On: 2018 By: Regi Lorenzo LPN No Known Allergies Current Immunizations  Reviewed on 2018 Name Date Influenza Vaccine 2014  9:28 AM, 2014  1:10 AM  
 Influenza Vaccine (Quad) PF 10/24/2017, 2017, 2015 Pneumococcal Polysaccharide (PPSV-23) 10/24/2017 Not reviewed this visit You Were Diagnosed With   
  
 Codes Comments Type 2 diabetes mellitus without complication, without long-term current use of insulin (HCC)    -  Primary ICD-10-CM: E11.9 ICD-9-CM: 250.00 Medication refill     ICD-10-CM: Z76.0 ICD-9-CM: V68.1 Other chronic pain     ICD-10-CM: G89.29 ICD-9-CM: 338.29 Hypertension, unspecified type     ICD-10-CM: I10 
ICD-9-CM: 401.9 Vitals BP Pulse Temp Resp Height(growth percentile) Weight(growth percentile)  137/89 (BP 1 Location: Left arm, BP Patient Position: Sitting) (!) 09 48 °F (36.1 °C) (Oral) 16 5' 7\" (1.702 m) 235 lb 12.8 oz (107 kg) SpO2 BMI Smoking Status 100% 36.93 kg/m2 Never Smoker Vitals History BMI and BSA Data Body Mass Index Body Surface Area  
 36.93 kg/m 2 2.25 m 2 Preferred Pharmacy Pharmacy Name Phone [phone], [address] [place] [address] [phone] Your Updated Medication List  
  
   
This list is accurate as of 5/22/18 10:10 AM.  Always use your most recent med list. amLODIPine-benazepril 5-20 mg per capsule Commonly known as:  LOTREL  
TAKE ONE CAPSULE BY MOUTH ONCE DAILY  
  
 aspirin, buffered 81 mg Tab Take 81 mg by mouth daily. atorvastatin 10 mg tablet Commonly known as:  LIPITOR  
TAKE 1 TABLET BY MOUTH DAILY  
  
 cephALEXin 500 mg capsule Commonly known as:  [employer] Take 1 Cap by mouth three (3) times daily. Indications: Skin and Skin Structure Infection FREESTYLE LITE METER monitoring kit Generic drug:  Blood-Glucose Meter Check fasting sugars daily before breakfast. BRAND MEDICALLY NECESSARY FREESTYLE LITE STRIPS strip Generic drug:  glucose blood VI test strips Check fasting sugars daily before breakfast. BRAND MEDICALLY NECESSARY. FOR FREESTYLE LITE METER  
  
 furosemide 20 mg tablet Commonly known as:  LASIX Take one po daily prn swelling  
  
 gabapentin 300 mg capsule Commonly known as:  NEURONTIN  
TAKE 2 CAPSULES BY MOUTH EVERY MORNING AND 2 CAPSULES EVERY EVENING Lancets Misc Check fasting sugars daily before breakfast. BRAND MEDICALLY NECESSARY. FOR FREESTYLE LITE METER  
  
 metoprolol tartrate 50 mg tablet Commonly known as:  LOPRESSOR  
TAKE 1 TABLET BY MOUTH TWICE A DAY  
  
 naloxone 4 mg/actuation nasal spray Commonly known as:  [employer] Use 1 spray intranasally into 1 nostril. Use a new Narcan nasal spray for subsequent doses and administer into alternating nostrils. oxyCODONE-acetaminophen 7.5-325 mg per tablet Commonly known as:  PERCOCET Take one po daily prn pain Prescriptions Printed Refills  
 oxyCODONE-acetaminophen (PERCOCET) 7.5-325 mg per tablet 0 Sig: Take one po daily prn pain  
 Class: Print  
 amLODIPine-benazepril (LOTREL) 5-20 mg per capsule 3 Sig: TAKE ONE CAPSULE BY MOUTH ONCE DAILY Class: Print To-Do List   
 05/22/2018 Lab:  HEMOGLOBIN A1C WITH EAG   
  
 05/22/2018 Lab:  METABOLIC PANEL, COMPREHENSIVE   
  
 05/22/2018 Lab:  Darby Salazar 13 (MW)   
  
 05/22/2018 Lab:  TSH 3RD GENERATION Please provide this summary of care documentation to your next provider. Your primary care clinician is listed as Juan Rodríguez. If you have any questions after today's visit, please call [phone].

## 2018-05-25 LAB — DRUGS UR: NORMAL

## 2018-06-06 ENCOUNTER — OFFICE VISIT (OUTPATIENT)
Dept: SURGERY | Age: 58
End: 2018-06-06

## 2018-06-06 VITALS
TEMPERATURE: 98.4 F | BODY MASS INDEX: 35.94 KG/M2 | SYSTOLIC BLOOD PRESSURE: 148 MMHG | OXYGEN SATURATION: 97 % | WEIGHT: 229 LBS | HEIGHT: 67 IN | HEART RATE: 70 BPM | RESPIRATION RATE: 17 BRPM | DIASTOLIC BLOOD PRESSURE: 98 MMHG

## 2018-06-06 DIAGNOSIS — L02.212 BACK ABSCESS: Primary | ICD-10-CM

## 2018-06-06 NOTE — PROGRESS NOTES
Progress Note    Patient: Diego Arrieta  MRN: [id number]  SSN: [id number]   YOB: 1960  Age: 61 y.o. Sex: male     Chief Complaint   Patient presents with    Follow-up     back abscess, patient thinks it has returned       HPI    Mr. Meryl Orr returns for a wound check after incision and drainage of a back abscess. He looks good his wounds well-healed and will see him as needed from now on.     Past Medical History:   Diagnosis Date    Advance directive discussed with patient 04/24/2017    Anxiety     Back injury 4/27/2007    Behavioral change     Chronic pain     CKD (chronic kidney disease) stage 2, GFR 60-89 ml/min 12/20/2013    Confusion     DDD (degenerative disc disease)     Depression     [facility], [facility]     Diabetic eye exam ([facility] 09/09/2016    Dr. Dequan Trotter ( bilateral cataracts)    Difficulty walking     DJD (degenerative joint disease)     Fatigue     Generalized stiffness     H/O colonoscopy 12/2/2013    Bx: showed 2 tubular adenomas Done by Dr. Emanuel Jackson    High cholesterol     Hypertension     Incoordination     Infected sebaceous cyst 5/4/2016    Insomnia     Joint pain     Lower extremity edema     Lymphedema     Muscle pain     Neuropathy     Osteoarthritis     Other and unspecified hyperlipidemia     Painful sexual intercourse     Poor concentration     Snoring     Type II or unspecified type diabetes mellitus without mention of complication, not stated as uncontrolled     Weakness      Past Surgical History:   Procedure Laterality Date    HX COLONOSCOPY      HX ORTHOPAEDIC      left elbow surgery    HX ORTHOPAEDIC Left 1/19/16    left Total Knee replacement    HX OTHER SURGICAL  02/2018    removal of abscess in back    MI DRAIN SKIN ABSCESS COMPLIC N/A 49/22/3059    Dr. Leif Porter     No Known Allergies  Current Outpatient Prescriptions   Medication Sig Dispense Refill    oxyCODONE-acetaminophen (PERCOCET) 7.5-325 mg per tablet Take one po daily prn pain 30 Tab 0    amLODIPine-benazepril (LOTREL) 5-20 mg per capsule TAKE ONE CAPSULE BY MOUTH ONCE DAILY 90 Cap 3    cephALEXin (KEFLEX) 500 mg capsule Take 1 Cap by mouth three (3) times daily. Indications: Skin and Skin Structure Infection 30 Cap 0    atorvastatin (LIPITOR) 10 mg tablet TAKE 1 TABLET BY MOUTH DAILY 90 Tab 3    metoprolol tartrate (LOPRESSOR) 50 mg tablet TAKE 1 TABLET BY MOUTH TWICE A  Tab 1    naloxone (NARCAN) 4 mg/actuation nasal spray Use 1 spray intranasally into 1 nostril. Use a new Narcan nasal spray for subsequent doses and administer into alternating nostrils. 1 Each 1    gabapentin (NEURONTIN) 300 mg capsule TAKE 2 CAPSULES BY MOUTH EVERY MORNING AND 2 CAPSULES EVERY EVENING 360 Cap 3    FREESTYLE LITE STRIPS strip Check fasting sugars daily before breakfast. BRAND MEDICALLY NECESSARY. FOR FREESTYLE LITE METER 100 Strip 11    Lancets misc Check fasting sugars daily before breakfast. BRAND MEDICALLY NECESSARY. FOR FREESTYLE LITE METER 100 Each 11    furosemide (LASIX) 20 mg tablet Take one po daily prn swelling 30 Tab 1    FREESTYLE LITE METER monitoring kit Check fasting sugars daily before breakfast. BRAND MEDICALLY NECESSARY 1 Kit 0    Aspirin, Buffered 81 mg tab Take 81 mg by mouth daily. Social History     Social History    Marital status:      Spouse name: N/A    Number of children: N/A    Years of education: N/A     Occupational History    Not on file. Social History Main Topics    Smoking status: Never Smoker    Smokeless tobacco: Never Used      Comment: Pt counseled to continue to not smoke.     Alcohol use No    Drug use: No    Sexual activity: Yes     Partners: Female     Birth control/ protection: Condom     Other Topics Concern    Not on file     Social History Narrative     Family History   Problem Relation Age of Onset    Stroke Mother     Hypertension Mother     Seizures Mother     Diabetes Father     Heart Disease Father     Hypertension Father     Other Maternal Grandmother      tumor in abdomen         Review of systems:  Patient denies any reflux, emesis, abdominal pain, change in bowel habits, hematochezia, melena, fever, weight loss, fatigue chills, dermatitis, abnormal moles, change in vision, vertigo, epistaxis, dysphagia, hoarseness, chest pain, palpitations, hypertension, edema, cough, shortness of breath, wheezing, hemoptysis, snoring, hematuria, diabetes, thyroid disease, anemia, bruising, history of blood transfusion, dizziness, headache, or fainting. Physical Examination    Well developed well nourished male in no apparent distress  Visit Vitals    BP (!) 148/98    Pulse 70    Temp 98.4 °F (36.9 °C) (Oral)    Resp 17    Ht 5' 7\" (1.702 m)    Wt 103.9 kg (229 lb)    SpO2 97%    BMI 35.87 kg/m2      Head: normocephalic, atraumatic  Mouth: Clear, no overt lesions, oral mucosa pink and moist  Neck: supple, no masses, no adenopathy or carotid bruits, trachea midline  Resp: clear to auscultation bilaterally, no wheeze, rhonchi or rales, excursions normal and symmetrical  Cardio: Regular rate and rhythm, no murmurs, clicks, gallops or rubs, no edema or varicosities  Abdomen: soft, nontender, nondistended, normoactive bowel sounds, no hernias, no hepatosplenomegaly,   Back: Well-healed back incision  Extremeties: warm, well-perfused, no tenderness or swelling, normal gait/station  Neuro: sensation and strength grossly intact and symmetrical  Psych: alert and oriented to person, place and time  Breast exam deferred    IMPRESSION  Status post excision of back abscess, doing well    PLAN  No orders of the defined types were placed in this encounter.     Follow-up as needed  Junie Knowles MD

## 2018-06-06 NOTE — MR AVS SNAPSHOT
[address] [facility] [address] 
[phone] Patient: Clary Epstein MRN: [id number] :[id number] Visit Information Date & Time Provider Department Dept. Phone Encounter #  
 2018  9:45 AM Zainab Gallegos [facility] [phone] Your Appointments 2018  9:45 AM  
Office Visit with Gautam Arciniega MD  
[address] [place] [address]) Appt Note: 3 month f/u HTN/DM  
 [address]  
  
   
 [address] [facility] Upcoming Health Maintenance Date Due  
 EYE EXAM RETINAL OR DILATED Q1 2017 FOOT EXAM Q1 2018 MEDICARE YEARLY EXAM 2018 COLONOSCOPY 2018 Influenza Age 5 to Adult 2018 HEMOGLOBIN A1C Q6M 2018 MICROALBUMIN Q1 2019 LIPID PANEL Q1 2019 DTaP/Tdap/Td series (2 - Td) 2027 Allergies as of 2018  Review Complete On: 2018 By: Suzie aMr MD  
 No Known Allergies Current Immunizations  Reviewed on 2018 Name Date Influenza Vaccine 2014  9:28 AM, 2014  1:10 AM  
 Influenza Vaccine (Quad) PF 10/24/2017, 2017, 2015 Pneumococcal Polysaccharide (PPSV-23) 10/24/2017 Not reviewed this visit Vitals BP Pulse Temp Resp Height(growth percentile) Weight(growth percentile) (!) 148/98 70 98.4 °F (36.9 °C) (Oral) 17 5' 7\" (1.702 m) 229 lb (103.9 kg) SpO2 BMI Smoking Status 97% 35.87 kg/m2 Never Smoker Vitals History BMI and BSA Data Body Mass Index Body Surface Area  
 35.87 kg/m 2 2.22 m 2 Preferred Pharmacy Pharmacy Name Phone [id number] [address] [phone] Your Updated Medication List  
  
   
 This list is accurate as of 6/6/18 10:26 AM.  Always use your most recent med list. amLODIPine-benazepril 5-20 mg per capsule Commonly known as:  LOTREL  
TAKE ONE CAPSULE BY MOUTH ONCE DAILY  
  
 aspirin, buffered 81 mg Tab Take 81 mg by mouth daily. atorvastatin 10 mg tablet Commonly known as:  LIPITOR  
TAKE 1 TABLET BY MOUTH DAILY  
  
 cephALEXin 500 mg capsule Commonly known as:  Thedore Peal Take 1 Cap by mouth three (3) times daily. Indications: Skin and Skin Structure Infection FREESTYLE LITE METER monitoring kit Generic drug:  Blood-Glucose Meter Check fasting sugars daily before breakfast. BRAND MEDICALLY NECESSARY FREESTYLE LITE STRIPS strip Generic drug:  glucose blood VI test strips Check fasting sugars daily before breakfast. BRAND MEDICALLY NECESSARY. FOR FREESTYLE LITE METER  
  
 furosemide 20 mg tablet Commonly known as:  LASIX Take one po daily prn swelling  
  
 gabapentin 300 mg capsule Commonly known as:  NEURONTIN  
TAKE 2 CAPSULES BY MOUTH EVERY MORNING AND 2 CAPSULES EVERY EVENING Lancets Misc Check fasting sugars daily before breakfast. BRAND MEDICALLY NECESSARY. FOR FREESTYLE LITE METER  
  
 metoprolol tartrate 50 mg tablet Commonly known as:  LOPRESSOR  
TAKE 1 TABLET BY MOUTH TWICE A DAY  
  
 naloxone 4 mg/actuation nasal spray Commonly known as:  [employer] Use 1 spray intranasally into 1 nostril. Use a new Narcan nasal spray for subsequent doses and administer into alternating nostrils. oxyCODONE-acetaminophen 7.5-325 mg per tablet Commonly known as:  PERCOCET Take one po daily prn pain Please provide this summary of care documentation to your next provider. Your primary care clinician is listed as Juan Rodríguez. If you have any questions after today's visit, please call [phone].

## 2018-08-22 ENCOUNTER — OFFICE VISIT (OUTPATIENT)
Dept: INTERNAL MEDICINE CLINIC | Age: 58
End: 2018-08-22

## 2018-08-22 ENCOUNTER — HOSPITAL ENCOUNTER (OUTPATIENT)
Dept: LAB | Age: 58
Discharge: HOME | End: 2018-08-22
Payer: MEDICARE

## 2018-08-22 VITALS
HEART RATE: 66 BPM | BODY MASS INDEX: 37.48 KG/M2 | SYSTOLIC BLOOD PRESSURE: 150 MMHG | HEIGHT: 67 IN | OXYGEN SATURATION: 97 % | WEIGHT: 238.8 LBS | DIASTOLIC BLOOD PRESSURE: 98 MMHG | TEMPERATURE: 97.4 F | RESPIRATION RATE: 18 BRPM

## 2018-08-22 DIAGNOSIS — G89.29 OTHER CHRONIC PAIN: ICD-10-CM

## 2018-08-22 DIAGNOSIS — E11.9 DIABETES MELLITUS, STABLE (HCC): ICD-10-CM

## 2018-08-22 DIAGNOSIS — E78.5 DYSLIPIDEMIA: ICD-10-CM

## 2018-08-22 DIAGNOSIS — Z00.00 ENCOUNTER FOR MEDICARE ANNUAL WELLNESS EXAM: Primary | ICD-10-CM

## 2018-08-22 DIAGNOSIS — I10 BENIGN HYPERTENSION WITHOUT CONGESTIVE HEART FAILURE: ICD-10-CM

## 2018-08-22 DIAGNOSIS — Z76.0 MEDICATION REFILL: ICD-10-CM

## 2018-08-22 LAB
ALBUMIN SERPL-MCNC: 3.7 G/DL (ref 3.4–5)
ALBUMIN/GLOB SERPL: 1.1 {RATIO} (ref 0.8–1.7)
ALP SERPL-CCNC: 107 U/L (ref 45–117)
ALT SERPL-CCNC: 51 U/L (ref 16–61)
ANION GAP SERPL CALC-SCNC: 7 MMOL/L (ref 3–18)
AST SERPL-CCNC: 23 U/L (ref 15–37)
BILIRUB SERPL-MCNC: 0.4 MG/DL (ref 0.2–1)
BUN SERPL-MCNC: 12 MG/DL (ref 7–18)
BUN/CREAT SERPL: 11 (ref 12–20)
CALCIUM SERPL-MCNC: 8.9 MG/DL (ref 8.5–10.1)
CHLORIDE SERPL-SCNC: 104 MMOL/L (ref 100–108)
CHOLEST SERPL-MCNC: 146 MG/DL
CO2 SERPL-SCNC: 30 MMOL/L (ref 21–32)
CREAT SERPL-MCNC: 1.12 MG/DL (ref 0.6–1.3)
EST. AVERAGE GLUCOSE BLD GHB EST-MCNC: 157 MG/DL
GLOBULIN SER CALC-MCNC: 3.3 G/DL (ref 2–4)
GLUCOSE SERPL-MCNC: 125 MG/DL (ref 74–99)
HBA1C MFR BLD: 7.1 % (ref 4.2–5.6)
HDLC SERPL-MCNC: 42 MG/DL (ref 40–60)
HDLC SERPL: 3.5 {RATIO} (ref 0–5)
LDLC SERPL CALC-MCNC: 85.2 MG/DL (ref 0–100)
LIPID PROFILE,FLP: NORMAL
POTASSIUM SERPL-SCNC: 3.9 MMOL/L (ref 3.5–5.5)
PROT SERPL-MCNC: 7 G/DL (ref 6.4–8.2)
SODIUM SERPL-SCNC: 141 MMOL/L (ref 136–145)
TRIGL SERPL-MCNC: 94 MG/DL (ref ?–150)
VLDLC SERPL CALC-MCNC: 18.8 MG/DL

## 2018-08-22 PROCEDURE — 80053 COMPREHEN METABOLIC PANEL: CPT | Performed by: INTERNAL MEDICINE

## 2018-08-22 PROCEDURE — 36415 COLL VENOUS BLD VENIPUNCTURE: CPT | Performed by: INTERNAL MEDICINE

## 2018-08-22 PROCEDURE — 83036 HEMOGLOBIN GLYCOSYLATED A1C: CPT | Performed by: INTERNAL MEDICINE

## 2018-08-22 PROCEDURE — 80061 LIPID PANEL: CPT | Performed by: INTERNAL MEDICINE

## 2018-08-22 RX ORDER — OXYCODONE AND ACETAMINOPHEN 7.5; 325 MG/1; MG/1
TABLET ORAL
Qty: 30 TAB | Refills: 0 | Status: SHIPPED | OUTPATIENT
Start: 2018-08-22 | End: 2018-09-24 | Stop reason: SDUPTHER

## 2018-08-22 NOTE — ACP (ADVANCE CARE PLANNING)

## 2018-08-22 NOTE — PROGRESS NOTES
Identified pt with two pt identifiers(name and ). Reviewed record in preparation for visit and have obtained necessary documentation. Room Number: 1    Sandra Oh presents today for   Chief Complaint   Patient presents with    Hypertension     Follow-up    Diabetes    Annual Wellness Visit       Sandra Oh preferred language for health care discussion is english/other. Is someone accompanying this pt? No    Is the patient using any DME equipment during OV? No    Depression Screening:  PHQ over the last two weeks 2018 2018 10/24/2017 2017 2017 2016 3/23/2016   Little interest or pleasure in doing things Not at all Not at all Not at all Not at all Not at all Not at all Not at all   Feeling down, depressed, irritable, or hopeless Not at all Not at all Not at all Not at all Not at all Not at all Not at all   Total Score PHQ 2 0 0 0 0 0 0 0       Learning Assessment:  Learning Assessment 2013   PRIMARY LEARNER Patient Patient Patient   HIGHEST LEVEL OF EDUCATION - PRIMARY LEARNER  - DID NOT GRADUATE HIGH SCHOOL DID NOT GRADUATE [address]14 y 28    NEED - - No   LEARNER PREFERENCE PRIMARY DEMONSTRATION  [facility] - - no   ANSWERED BY self patient patient   RELATIONSHIP SELF SELF SELF       Abuse Screening:  Abuse Screening Questionnaire 10/24/2017   Do you ever feel afraid of your partner? N   Are you in a relationship with someone who physically or mentally threatens you? N   Is it safe for you to go home?  Y     ADL Assessment 2018   Feeding yourself No Help Needed   Getting from bed to chair No Help Needed   Getting dressed No Help Needed   Bathing or showering No Help Needed   Walk across the room (includes cane/walker) No Help Needed   Using the telphone No Help Needed   Taking your medications No Help Needed   Preparing meals No Help Needed   Managing money (expenses/bills) No Help Needed   Moderately strenuous housework (laundry) No Help Needed   Shopping for personal items (toiletries/medicines) No Help Needed   Shopping for groceries No Help Needed   Driving No Help Needed   Climbing a flight of stairs No Help Needed   Getting to places beyond walking distances No Help Needed       Advance Directive:  1. Do you have an advance directive in place? Patient Reply: No    2. If not, would you like material regarding how to put one in place? Patient Reply: No    Coordination of Care:  1. Have you been to the ER, urgent care clinic since your last visit? Hospitalized since your last visit? No    2. Have you seen or consulted any other health care providers outside of the [address] [place] [address] [place] since your last visit? Include any colon screening.  No

## 2018-08-22 NOTE — PROGRESS NOTES
.  Chief Complaint   Patient presents with    Hypertension     Follow-up    Diabetes    Annual Wellness Visit       HPI:     Rosana Hamm is a 61 y.o.  male with history of type 2 DM and hypertension  here for the above complaint. He denies any chest pain, shortness of breath, abdominal pain, headaches or dizziness. Every other week checks sugars. Type 2 DM: 110 was the last sugar check.          Past Medical History:   Diagnosis Date    Advance directive discussed with patient 04/24/2017    Anxiety     Back injury 4/27/2007    Behavioral change     Chronic pain     CKD (chronic kidney disease) stage 2, GFR 60-89 ml/min 12/20/2013    Confusion     DDD (degenerative disc disease)     Depression     Diabetes mellitus, [facility] [address]     Diabetic eye exam ([facility] [address] 09/09/2016    Dr. Byron Payne ( bilateral cataracts)    Difficulty walking     DJD (degenerative joint disease)     Fatigue     Generalized stiffness     H/O colonoscopy 12/2/2013    Bx: showed 2 tubular adenomas Done by Dr. Grace Blevins    High cholesterol     Hypertension     Incoordination     Infected sebaceous cyst 5/4/2016    Insomnia     Joint pain     Lower extremity edema     Lymphedema     Muscle pain     Neuropathy     Osteoarthritis     Other and unspecified hyperlipidemia     Painful sexual intercourse     Poor concentration     Snoring     Type II or unspecified type diabetes mellitus without mention of complication, not stated as uncontrolled     Weakness      Past Surgical History:   Procedure Laterality Date    HX COLONOSCOPY      HX ORTHOPAEDIC      left elbow surgery    HX ORTHOPAEDIC Left 1/19/16    left Total Knee replacement    HX OTHER SURGICAL  02/2018    removal of abscess in back    MI DRAIN SKIN ABSCESS COMPLIC N/A 22/73/4210    Dr. Mika Pretty     Current Outpatient Prescriptions   Medication Sig    oxyCODONE-acetaminophen (PERCOCET) 7.5-325 mg per tablet Take one po daily prn pain    naloxone (NARCAN) 4 mg/actuation nasal spray Use 1 spray intranasally into 1 nostril. Use a new Narcan nasal spray for subsequent doses and administer into alternating nostrils.  amLODIPine-benazepril (LOTREL) 5-20 mg per capsule TAKE ONE CAPSULE BY MOUTH ONCE DAILY    cephALEXin (KEFLEX) 500 mg capsule Take 1 Cap by mouth three (3) times daily. Indications: Skin and Skin Structure Infection    atorvastatin (LIPITOR) 10 mg tablet TAKE 1 TABLET BY MOUTH DAILY    metoprolol tartrate (LOPRESSOR) 50 mg tablet TAKE 1 TABLET BY MOUTH TWICE A DAY    gabapentin (NEURONTIN) 300 mg capsule TAKE 2 CAPSULES BY MOUTH EVERY MORNING AND 2 CAPSULES EVERY EVENING    FREESTYLE LITE STRIPS strip Check fasting sugars daily before breakfast. BRAND MEDICALLY NECESSARY. FOR FREESTYLE LITE METER    Lancets misc Check fasting sugars daily before breakfast. BRAND MEDICALLY NECESSARY. FOR FREESTYLE LITE METER    furosemide (LASIX) 20 mg tablet Take one po daily prn swelling    FREESTYLE LITE METER monitoring kit Check fasting sugars daily before breakfast. BRAND MEDICALLY NECESSARY    Aspirin, Buffered 81 mg tab Take 81 mg by mouth daily. No current facility-administered medications for this visit. Health Maintenance   Topic Date Due    EYE EXAM RETINAL OR DILATED Q1  09/09/2017    FOOT EXAM Q1  01/25/2018    Influenza Age 5 to Adult  08/01/2018    COLONOSCOPY  12/02/2018    HEMOGLOBIN A1C Q6M  11/22/2018    MICROALBUMIN Q1  02/22/2019    LIPID PANEL Q1  02/22/2019    MEDICARE YEARLY EXAM  08/23/2019    DTaP/Tdap/Td series (2 - Td) 04/24/2027    Hepatitis C Screening  Completed    Pneumococcal 19-64 Medium Risk  Completed     Immunization History   Administered Date(s) Administered    Influenza Vaccine 02/06/2014, 09/09/2014    Influenza Vaccine (Quad) PF 11/23/2015, 01/23/2017, 10/24/2017    Pneumococcal Polysaccharide (PPSV-23) 10/24/2017     No LMP for male patient.         Allergies and Intolerances:   No Known Allergies    Family History:   Family History   Problem Relation Age of Onset   [facility] Stroke Mother     Hypertension Mother     Seizures Mother     Diabetes Father     Heart Disease Father     Hypertension Father     Other Maternal Grandmother      tumor in abdomen       Social History:   He  reports that he has never smoked. He has never used smokeless tobacco.  He  reports that he does not drink alcohol. ·     Objective:   Physical exam:   Visit Vitals    BP (!) 150/98 (BP 1 Location: Left arm, BP Patient Position: Sitting)  Comment: pt did not take his BP meds this AM.    Pulse 66    Temp 97.4 °F (36.3 °C) (Oral)    Resp 18    Ht 5' 7\" (1.702 m)    Wt 238 lb 12.8 oz (108.3 kg)    SpO2 97%    BMI 37.4 kg/m2        Generally: Pleasant male in no acute distress  Cardiac Exam: regular, rate, and rhythm. Normal S1 and S2. No murmurs, gallops, or rubs  Pulmonary exam: Clear to auscultation bilaterally  Abdominal exam: Positive bowel sounds in all four quadrants, soft, nondistended, nontender  Extremities: 2+ dorsalis pedis pulses bilaterally.  No pedal edema    bilaterally    LABS/Radiological Tests:  Lab Results   Component Value Date/Time    WBC 5.8 03/12/2018 12:06 PM    HGB 15.6 03/12/2018 12:06 PM    HCT 46.4 03/12/2018 12:06 PM    PLATELET 077 93/78/8436 12:06 PM     Lab Results   Component Value Date/Time    Sodium 138 05/22/2018 10:13 AM    Potassium 3.9 05/22/2018 10:13 AM    Chloride 101 05/22/2018 10:13 AM    CO2 28 05/22/2018 10:13 AM    Glucose 148 (H) 05/22/2018 10:13 AM    BUN 17 05/22/2018 10:13 AM    Creatinine 1.18 05/22/2018 10:13 AM     Lab Results   Component Value Date/Time    Cholesterol, total 131 02/22/2018 09:34 AM    HDL Cholesterol 35 (L) 02/22/2018 09:34 AM    LDL, calculated 78 02/22/2018 09:34 AM    Triglyceride 90 02/22/2018 09:34 AM     No results found for: GPT  Component      Latest Ref Rng & Units 5/22/2018          10:13 AM   Hemoglobin A1c, (calculated)      4.2 - 5.6 % 6.9 (H)   Est. average glucose      mg/dL 151   Previous labs      ASSESSMENT/PLAN:    1. Encounter for Medicare annual wellness exam    2. Diabetes mellitus, stable ([address]): we will see what the labs show. Continue diet, exercise and check sugars daily before breakfast.  -     HEMOGLOBIN A1C WITH EAG; Future    3. Benign hypertension without congestive heart failure: not well controlled. He did not take his BP med this AM. He will take his BP meds ASAP. Continue the lopressor and lotrel and lasix and diet and exercise. 4. Dyslipidemia: we will see what the labs show. Continue the lipitor, diet and exercise. -     METABOLIC PANEL, COMPREHENSIVE; Future  -     LIPID PANEL; Future  . 5. Medication refill: UDS:5/22/18 pain agreement signed on : 7/25/18. Checked  and no aberrancies seen. -     oxyCODONE-acetaminophen (PERCOCET) 7.5-325 mg per tablet; Take one po daily prn pain    6. Other chronic pain  -     oxyCODONE-acetaminophen (PERCOCET) 7.5-325 mg per tablet; Take one po daily prn pain      7. Requested Prescriptions     Signed Prescriptions Disp Refills    oxyCODONE-acetaminophen (PERCOCET) 7.5-325 mg per tablet 30 Tab 0     Sig: Take one po daily prn pain       8. Patient verbalized understanding and agreement with the plan. 9. Patient was given an after-visit summary. 10. Follow-up Disposition:  Return in about 3 months (around 11/22/2018) for f/u DM/HTN/lipids. or sooner if worsening symptoms.                 Ree Benavides MD

## 2018-08-22 NOTE — PATIENT INSTRUCTIONS
1) follow-up in 3 months or sooner if worsening symptoms. 2) Take BP meds ASAP. 3) Check fasting sugars daily before breakfast.    4) relion meter and strips are cheaper. Schedule of Personalized Health Plan  (Provide Copy to Patient)  The best way to stay healthy is to live a healthy lifestyle. A healthy lifestyle includes regular exercise, eating a well-balanced diet, keeping a healthy weight and not smoking. Regular physical exams and screening tests are another important way to take care of yourself. Preventive exams provided by health care providers can find health problems early when treatment works best and can keep you from getting certain diseases or illnesses. Preventive services include exams, lab tests, screenings, shots, monitoring and information to help you take care of your own health. All people over 65 should have a pneumonia shot. Pneumonia shots are usually only needed once in a lifetime unless your doctor decides differently. All people over 65 should have a yearly flu shot. People over 65 are at medium to high risk for Hepatitis B. Three shots are needed for complete protection. In addition to your physical exam, some screening tests are recommended:    Bone mass measurement (dexa scan) is recommended every two years if you have certain risk factors, such as personal history of vertebral fracture or chronic steroid medication use    Diabetes Mellitus screening is recommended every year. Glaucoma is an eye disease caused by high pressure in the eye. An eye exam is recommended every year. Cardiovascular screening tests that check your cholesterol and other blood fat (lipid) levels are recommended every five years. Colorectal Cancer screening tests help to find pre-cancerous polyps (growths in the colon) so they can be removed before they turn into cancer. Tests ordered for screening depend on your personal and family history risk factors.     Screening for Prostate Cancer is recommended yearly with a digital rectal exam and/or a PSA test    Here is a list of your current Health Maintenance items with a due date:  Health Maintenance   Topic Date Due    EYE EXAM RETINAL OR DILATED Q1  09/09/2017    FOOT EXAM Q1  01/25/2018    MEDICARE YEARLY EXAM  04/25/2018    Influenza Age 9 to Adult  08/01/2018    COLONOSCOPY  12/02/2018    HEMOGLOBIN A1C Q6M  11/22/2018    MICROALBUMIN Q1  02/22/2019    LIPID PANEL Q1  02/22/2019    DTaP/Tdap/Td series (2 - Td) 04/24/2027    Hepatitis C Screening  Completed    Pneumococcal 19-64 Medium Risk  Completed   1) check fasting sugars daily before breakfast           Advance Care Planning: Care Instructions  Your Care Instructions    It can be hard to live with an illness that cannot be cured. But if your health is getting worse, you may want to make decisions about end-of-life care. Planning for the end of your life does not mean that you are giving up. It is a way to make sure that your wishes are met. Clearly stating your wishes can make it easier for your loved ones. Making plans while you are still able may also ease your mind and make your final days less stressful and more meaningful. Follow-up care is a key part of your treatment and safety. Be sure to make and go to all appointments, and call your doctor if you are having problems. It's also a good idea to know your test results and keep a list of the medicines you take. What can you do to plan for the end of life? · You can bring these issues up with your doctor. You do not need to wait until your doctor starts the conversation. You might start with \"I would not be willing to live with . Kingman Mellow Kingman Mellow Kingman Mellow \" When you complete this sentence it helps your doctor understand your wishes. · Talk openly and honestly with your doctor. This is the best way to understand the decisions you will need to make as your health changes. Know that you can always change your mind.   · Ask your doctor about commonly used life-support measures. These include tube feedings, breathing machines, and fluids given through a vein (IV). Understanding these treatments will help you decide whether you want them. · You may choose to have these life-supporting treatments for a limited time. This allows a trial period to see whether they will help you. You may also decide that you want your doctor to take only certain measures to keep you alive. It is important to spell out these conditions so that your doctor and family understand them. · Talk to your doctor about how long you are likely to live. He or she may be able to give you an idea of what usually happens with your specific illness. · Think about preparing papers that state your wishes. This way there will not be any confusion about what you want. You can change your instructions at any time. Which papers should you prepare? Advance directives are legal papers that tell doctors how you want to be cared for at the end of your life. You do not need a  to write these papers. Ask your doctor or your state [facility] department for information on how to write your advance directives. They may have the forms for each of these types of papers. Make sure your doctor has a copy of these on file, and give a copy to a family member or close friend. · Consider a do-not-resuscitate order (DNR). This order asks that no extra treatments be done if your heart stops or you stop breathing. Extra treatments may include cardiopulmonary resuscitation (CPR), electrical shock to restart your heart, or a machine to breathe for you. If you decide to have a DNR order, ask your doctor to explain and write it. Place the order in your home where everyone can easily see it. · Consider a living will. A living will explains your wishes about life support and other treatments at the end of your life if you become unable to speak for yourself.  Living cavazos tell doctors to use or not use treatments that would keep you alive. You must have one or two witnesses or a notary present when you sign this form. · Consider a durable power of  for health care. This allows you to name a person to make decisions about your care if you are not able to. Most people ask a close friend or family member. Talk to this person about the kinds of treatments you want and those that you do not want. Make sure this person understands your wishes. These legal papers are simple to change. Tell your doctor what you want to change, and ask him or her to make a note in your medical file. Give your family updated copies of the papers. Where can you learn more? Go to http://yanciItsGoinOnava.info/. Enter P184 in the search box to learn more about \"Advance Care Planning: Care Instructions. \"  Current as of: October 6, 2017  Content Version: 11.7  © 6194-1350 [employer]. Care instructions adapted under license by [employer] (which disclaims liability or warranty for this information). If you have questions about a medical condition or this instruction, always ask your healthcare professional. [place] [address] any warranty or liability for your use of this information. Learning About Durable Power of  for Health Care  What is a durable power of  for health care? A durable power of  for health care is one type of the legal forms called advance directives. It lets you decide who you want to make treatment decisions for you if you cannot speak or decide for yourself. The person you choose is called your health care agent. Another type of advance directive is a living will. It lets you write down what kinds of treatment or life support you want or do not want. What should you think about when choosing a health care agent? Choose your health care agent carefully. This person may or may not be a family member.   Talk to the person before you make your final decision. Make sure he or she is comfortable with this responsibility. It's a good idea to choose someone who:  · Is at least 25years old. · Knows you well and understands what makes life meaningful for you. · Understands your Jain and moral values. · Will do what you want, not what he or she wants. · Will be able to make difficult choices at a stressful time. · Will be able to refuse or stop treatment, if that is what you would want, even if you could die. · Will be firm and confident with health professionals if needed. · Will ask questions to get necessary information. · Lives near you or agrees to travel to you if needed. Your family may help you make medical decisions while you can still be part of that process. But it is important to choose one person to be your health care agent in case you are not able to make decisions for yourself. If you don't fill out the legal form and name a health care agent, the decisions your family can make may be limited. Who will make decisions for you if you do not have a health care agent? If you don't have a health care agent or a living will, your family members may disagree about your medical care. And then some medical professionals who may not know you as well might have to make decisions for you. In some cases, a  makes the decisions. When you name a health care agent, it is very clear who has the power to make health decisions for you. How do you name a health care agent? You name your health care agent on a legal form. It is usually called a durable power of  for health care. Ask your hospital, state bar association, or office on aging where to find these forms. You must sign the form to make it legal. Some states require you to get the form notarized. This means that a person called a  watches you sign the form and then he or she signs the form.  Some states also require that two or more witnesses sign the form. Be sure to tell your family members and doctors who your health care agent is. Keep your forms in a safe place. But make sure that your loved ones know where the forms are. This could be in your desk where you keep other important papers. Make sure your doctor has a copy of your forms. Where can you learn more? Go to http://yanci-ava.info/. Enter [id number] in the search box to learn more about \"Learning About Durable Power of  for [facility]. \"  Current as of: October 6, 2017  Content Version: 11.7  © 9483-8516 [employer]. Care instructions adapted under license by [employer] (which disclaims liability or warranty for this information). If you have questions about a medical condition or this instruction, always ask your healthcare professional. [place] [address] any warranty or liability for your use of this information. Learning About Living Agra Yam[place]th  What is a living will? A living will is a legal form you use to write down the kind of care you want at the end of your life. It is used by the health professionals who will treat you if you aren't able to decide for yourself. If you put your wishes in writing, your loved ones and others will know what kind of care you want. They won't need to guess. This can ease your mind and be helpful to others. A living will is not the same as an estate or property will. An estate will explains what you want to happen with your money and property after you die. Is a living will a legal document? A living will is a legal document. Each state has its own laws about living cavazos. If you move to another state, make sure that your living will is legal in the state where you now live. Or you might use a universal form that has been approved by many states. This kind of form can sometimes be completed and stored online.  Your electronic copy will then be available wherever you have a connection to the Internet. In most cases, doctors will respect your wishes even if you have a form from a different state. · You don't need an  to complete a living will. But legal advice can be helpful if your state's laws are unclear, your health history is complicated, or your family can't agree on what should be in your living will. · You can change your living will at any time. Some people find that their wishes about end-of-life care change as their health changes. · In addition to making a living will, think about completing a medical power of  form. This form lets you name the person you want to make end-of-life treatment decisions for you (your \"health care agent\") if you're not able to. Many hospitals and nursing homes will give you the forms you need to complete a living will and a medical power of . · Your living will is used only if you can't make or communicate decisions for yourself anymore. If you become able to make decisions again, you can accept or refuse any treatment, no matter what you wrote in your living will. · Your state may offer an online registry. This is a place where you can store your living will online so the doctors and nurses who need to treat you can find it right away. What should you think about when creating a living will? Talk about your end-of-life wishes with your family members and your doctor. Let them know what you want. That way the people making decisions for you won't be surprised by your choices. Think about these questions as you make your living will:  · Do you know enough about life support methods that might be used? If not, talk to your doctor so you know what might be done if you can't breathe on your own, your heart stops, or you're unable to swallow. · What things would you still want to be able to do after you receive life-support methods? Would you want to be able to walk? To speak? To eat on your own?  To live without the help of machines? · If you have a choice, where do you want to be cared for? In your home? At a hospital or nursing home? · Do you want certain Quaker practices performed if you become very ill? · If you have a choice at the end of your life, where would you prefer to die? At home? In a hospital or nursing home? Somewhere else? · Would you prefer to be buried or cremated? · Do you want your organs to be donated after you die? What should you do with your living will? · Make sure that your family members and your health care agent have copies of your living will. · Give your doctor a copy of your living will to keep in your medical record. If you have more than one doctor, make sure that each one has a copy. · You may want to put a copy of your living will where it can be easily found. Where can you learn more? Go to http://yanci-ava.info/. Enter S133 in the search box to learn more about \"Learning About Living [place]. \"  Current as of: October 6, 2017  Content Version: 11.7  © 8771-8888 [employer]. Care instructions adapted under license by [employer] (which disclaims liability or warranty for this information). If you have questions about a medical condition or this instruction, always ask your healthcare professional. [address] any warranty or liability for your use of this information. Advance Directives: Care Instructions  Your Care Instructions  An advance directive is a legal way to state your wishes at the end of your life. It tells your family and your doctor what to do if you can no longer say what you want. There are two main types of advance directives. You can change them any time that your wishes change. · A living will tells your family and your doctor your wishes about life support and other treatment.   · A durable power of  for health care lets you name a person to make treatment decisions for you when you can't speak for yourself. This person is called a health care agent. If you do not have an advance directive, decisions about your medical care may be made by a doctor or a  who doesn't know you. It may help to think of an advance directive as a gift to the people who care for you. If you have one, they won't have to make tough decisions by themselves. Follow-up care is a key part of your treatment and safety. Be sure to make and go to all appointments, and call your doctor if you are having problems. It's also a good idea to know your test results and keep a list of the medicines you take. How can you care for yourself at home? · Discuss your wishes with your loved ones and your doctor. This way, there are no surprises. · Many states have a unique form. Or you might use a universal form that has been approved by many states. This kind of form can sometimes be completed and stored online. Your electronic copy will then be available wherever you have a connection to the Internet. In most cases, doctors will respect your wishes even if you have a form from a different state. · You don't need a  to do an advance directive. But you may want to get legal advice. · Think about these questions when you prepare an advance directive:  ¨ Who do you want to make decisions about your medical care if you are not able to? Many people choose a family member or close friend. ¨ Do you know enough about life support methods that might be used? If not, talk to your doctor so you understand. ¨ What are you most afraid of that might happen? You might be afraid of having pain, losing your independence, or being kept alive by machines. ¨ Where would you prefer to die? Choices include your home, a hospital, or a nursing home. ¨ Would you like to have information about hospice care to support you and your family? ¨ Do you want to donate organs when you die?   ¨ Do you want certain Restorationism practices performed before you die? If so, put your wishes in the advance directive. · Read your advance directive every year, and make changes as needed. When should you call for help? Be sure to contact your doctor if you have any questions. Where can you learn more? Go to http://yanci-ava.info/. Enter R264 in the search box to learn more about \"Advance Directives: Care Instructions. \"  Current as of: October 6, 2017  Content Version: 11.7  © 7119-0042 [employer]. Care instructions adapted under license by [employer] (which disclaims liability or warranty for this information). If you have questions about a medical condition or this instruction, always ask your healthcare professional. [address] any warranty or liability for your use of this information.

## 2018-08-22 NOTE — MR AVS SNAPSHOT
[address] [place] [address] 
 
 
 [address] [facility] [address] 
[phone] Patient: Gordo Fajardo MRN: [id number] :[id number] Visit Information Date & Time Provider Department Dept. Phone Encounter #  
 2018  9:45 AM Keeley Ennis MD [address] [place] [address] [phone] [id number] Follow-up Instructions Return in about 3 months (around 2018) for f/u DM/HTN/lipids. Upcoming Health Maintenance Date Due  
 EYE EXAM RETINAL OR DILATED Q1 2017 FOOT EXAM Q1 2018 Influenza Age 5 to Adult 2018 COLONOSCOPY 2018 HEMOGLOBIN A1C Q6M 2018 MICROALBUMIN Q1 2019 LIPID PANEL Q1 2019 MEDICARE YEARLY EXAM 2019 DTaP/Tdap/Td series (2 - Td) 2027 Allergies as of 2018  Review Complete On: 2018 By: Oliver Marlow MD  
 No Known Allergies Current Immunizations  Reviewed on 2018 Name Date Influenza Vaccine 2014  9:28 AM, 2014  1:10 AM  
 Influenza Vaccine (Quad) PF 10/24/2017, 2017, 2015 Pneumococcal Polysaccharide (PPSV-23) 10/24/2017 Not reviewed this visit You Were Diagnosed With   
  
 Codes Comments Encounter for Medicare annual wellness exam    -  Primary ICD-10-CM: Z00.00 ICD-9-CM: V70.0 Diabetes mellitus, stable ([facility] [address]     ICD-10-CM: E11.9 ICD-9-CM: 250.00 Dyslipidemia     ICD-10-CM: E78.5 ICD-9-CM: 272.4 Vitals BP Pulse Temp Resp Height(growth percentile) Weight(growth percentile) (!) 150/98 (BP 1 Location: Left arm, BP Patient Position: Sitting) 66 97.4 °F (36.3 °C) (Oral) 18 5' 7\" (1.702 m) 238 lb 12.8 oz (108.3 kg) SpO2 BMI Smoking Status 97% 37.4 kg/m2 Never Smoker Vitals History BMI and BSA Data Body Mass Index Body Surface Area  
 37.4 kg/m 2 2.26 m 2 Preferred Pharmacy Pharmacy Name Phone [id number]- [facility], [address] [place] [address] [place] [phone] Your Updated Medication List  
  
   
This list is accurate as of 8/22/18  9:58 AM.  Always use your most recent med list. amLODIPine-benazepril 5-20 mg per capsule Commonly known as:  LOTREL  
TAKE ONE CAPSULE BY MOUTH ONCE DAILY  
  
 aspirin, buffered 81 mg Tab Take 81 mg by mouth daily. atorvastatin 10 mg tablet Commonly known as:  LIPITOR  
TAKE 1 TABLET BY MOUTH DAILY  
  
 cephALEXin 500 mg capsule Commonly known as:  Maru Hides Take 1 Cap by mouth three (3) times daily. Indications: Skin and Skin Structure Infection FREESTYLE LITE METER monitoring kit Generic drug:  Blood-Glucose Meter Check fasting sugars daily before breakfast. BRAND MEDICALLY NECESSARY FREESTYLE LITE STRIPS strip Generic drug:  glucose blood VI test strips Check fasting sugars daily before breakfast. BRAND MEDICALLY NECESSARY. FOR FREESTYLE LITE METER  
  
 furosemide 20 mg tablet Commonly known as:  LASIX Take one po daily prn swelling  
  
 gabapentin 300 mg capsule Commonly known as:  NEURONTIN  
TAKE 2 CAPSULES BY MOUTH EVERY MORNING AND 2 CAPSULES EVERY EVENING Lancets Misc Check fasting sugars daily before breakfast. BRAND MEDICALLY NECESSARY. FOR FREESTYLE LITE METER  
  
 metoprolol tartrate 50 mg tablet Commonly known as:  LOPRESSOR  
TAKE 1 TABLET BY MOUTH TWICE A DAY  
  
 naloxone 4 mg/actuation nasal spray Commonly known as:  [employer] Use 1 spray intranasally into 1 nostril. Use a new Narcan nasal spray for subsequent doses and administer into alternating nostrils. oxyCODONE-acetaminophen 7.5-325 mg per tablet Commonly known as:  PERCOCET Take one po daily prn pain Follow-up Instructions Return in about 3 months (around 11/22/2018) for f/u DM/HTN/lipids. To-Do List   
 08/22/2018 Lab:  HEMOGLOBIN A1C WITH EAG   
  
 08/22/2018 Lab: LIPID PANEL   
  
 08/22/2018 Lab:  METABOLIC PANEL, COMPREHENSIVE Patient Instructions 1) follow-up in 3 months or sooner if worsening symptoms. 2) Take BP meds ASAP. 3) Check fasting sugars daily before breakfast. 
 
4) relion meter and strips are cheaper. Schedule of Personalized Health Plan (Provide Copy to Patient) The best way to stay healthy is to live a healthy lifestyle. A healthy lifestyle includes regular exercise, eating a well-balanced diet, keeping a healthy weight and not smoking. Regular physical exams and screening tests are another important way to take care of yourself. Preventive exams provided by health care providers can find health problems early when treatment works best and can keep you from getting certain diseases or illnesses. Preventive services include exams, lab tests, screenings, shots, monitoring and information to help you take care of your own health. All people over 65 should have a pneumonia shot. Pneumonia shots are usually only needed once in a lifetime unless your doctor decides differently. All people over 65 should have a yearly flu shot. People over 65 are at medium to high risk for Hepatitis B. Three shots are needed for complete protection. In addition to your physical exam, some screening tests are recommended: 
 
Bone mass measurement (dexa scan) is recommended every two years if you have certain risk factors, such as personal history of vertebral fracture or chronic steroid medication use Diabetes Mellitus screening is recommended every year. Glaucoma is an eye disease caused by high pressure in the eye. An eye exam is recommended every year. Cardiovascular screening tests that check your cholesterol and other blood fat (lipid) levels are recommended every five years.   
 
Colorectal Cancer screening tests help to find pre-cancerous polyps (growths in the colon) so they can be removed before they turn into cancer. Tests ordered for screening depend on your personal and family history risk factors. Screening for Prostate Cancer is recommended yearly with a digital rectal exam and/or a PSA test 
 
Here is a list of your current Health Maintenance items with a due date: 
Health Maintenance Topic Date Due  
 EYE EXAM RETINAL OR DILATED Q1  09/09/2017  
 FOOT EXAM Q1  01/25/2018  MEDICARE YEARLY EXAM  04/25/2018  Influenza Age 5 to Adult  08/01/2018  COLONOSCOPY  12/02/2018  HEMOGLOBIN A1C Q6M  11/22/2018  MICROALBUMIN Q1  02/22/2019  LIPID PANEL Q1  02/22/2019  
 DTaP/Tdap/Td series (2 - Td) 04/24/2027  Hepatitis C Screening  Completed  Pneumococcal 19-64 Medium Risk  Completed 1) check fasting sugars daily before breakfast 
 
 
 
  
Advance Care Planning: Care Instructions Your Care Instructions It can be hard to live with an illness that cannot be cured. But if your health is getting worse, you may want to make decisions about end-of-life care. Planning for the end of your life does not mean that you are giving up. It is a way to make sure that your wishes are met. Clearly stating your wishes can make it easier for your loved ones. Making plans while you are still able may also ease your mind and make your final days less stressful and more meaningful. Follow-up care is a key part of your treatment and safety. Be sure to make and go to all appointments, and call your doctor if you are having problems. It's also a good idea to know your test results and keep a list of the medicines you take. What can you do to plan for the end of life? · You can bring these issues up with your doctor. You do not need to wait until your doctor starts the conversation. You might start with \"I would not be willing to live with . Mac Win \" When you complete this sentence it helps your doctor understand your wishes. · Talk openly and honestly with your doctor.  This is the best way to understand the decisions you will need to make as your health changes. Know that you can always change your mind. · Ask your doctor about commonly used life-support measures. These include tube feedings, breathing machines, and fluids given through a vein (IV). Understanding these treatments will help you decide whether you want them. · You may choose to have these life-supporting treatments for a limited time. This allows a trial period to see whether they will help you. You may also decide that you want your doctor to take only certain measures to keep you alive. It is important to spell out these conditions so that your doctor and family understand them. · Talk to your doctor about how long you are likely to live. He or she may be able to give you an idea of what usually happens with your specific illness. · Think about preparing papers that state your wishes. This way there will not be any confusion about what you want. You can change your instructions at any time. Which papers should you prepare? Advance directives are legal papers that tell doctors how you want to be cared for at the end of your life. You do not need a  to write these papers. Ask your doctor or your state health department for information on how to write your advance directives. They may have the forms for each of these types of papers. Make sure your doctor has a copy of these on file, and give a copy to a family member or close friend. · Consider a do-not-resuscitate order (DNR). This order asks that no extra treatments be done if your heart stops or you stop breathing. Extra treatments may include cardiopulmonary resuscitation (CPR), electrical shock to restart your heart, or a machine to breathe for you. If you decide to have a DNR order, ask your doctor to explain and write it. Place the order in your home where everyone can easily see it. · Consider a living will.  A living will explains your wishes about life support and other treatments at the end of your life if you become unable to speak for yourself. Living cavazos tell doctors to use or not use treatments that would keep you alive. You must have one or two witnesses or a notary present when you sign this form. · Consider a durable power of  for health care. This allows you to name a person to make decisions about your care if you are not able to. Most people ask a close friend or family member. Talk to this person about the kinds of treatments you want and those that you do not want. Make sure this person understands your wishes. These legal papers are simple to change. Tell your doctor what you want to change, and ask him or her to make a note in your medical file. Give your family updated copies of the papers. Where can you learn more? Go to http://yanci-ava.info/. Enter P184 in the search box to learn more about \"Advance Care Planning: Care Instructions. \" Current as of: October 6, 2017 Content Version: 11.7 © 8346-5488 [employer]. Care instructions adapted under license by [employer] (which disclaims liability or warranty for this information). If you have questions about a medical condition or this instruction, always ask your healthcare professional. [address] any warranty or liability for your use of this information. Learning About Durable Power of  for Health Care What is a durable power of  for health care? A durable power of  for health care is one type of the legal forms called advance directives. It lets you decide who you want to make treatment decisions for you if you cannot speak or decide for yourself. The person you choose is called your health care agent. Another type of advance directive is a living will. It lets you write down what kinds of treatment or life support you want or do not want. What should you think about when choosing a health care agent? Choose your health care agent carefully. This person may or may not be a family member. Talk to the person before you make your final decision. Make sure he or she is comfortable with this responsibility. It's a good idea to choose someone who: · Is at least 25years old. · Knows you well and understands what makes life meaningful for you. · Understands your Faith and moral values. · Will do what you want, not what he or she wants. · Will be able to make difficult choices at a stressful time. · Will be able to refuse or stop treatment, if that is what you would want, even if you could die. · Will be firm and confident with health professionals if needed. · Will ask questions to get necessary information. · Lives near you or agrees to travel to you if needed. Your family may help you make medical decisions while you can still be part of that process. But it is important to choose one person to be your health care agent in case you are not able to make decisions for yourself. If you don't fill out the legal form and name a health care agent, the decisions your family can make may be limited. Who will make decisions for you if you do not have a health care agent? If you don't have a health care agent or a living will, your family members may disagree about your medical care. And then some medical professionals who may not know you as well might have to make decisions for you. In some cases, a  makes the decisions. When you name a health care agent, it is very clear who has the power to make health decisions for you. How do you name a health care agent? You name your health care agent on a legal form. It is usually called a durable power of  for health care. Ask your hospital, state bar association, or office on aging where to find these forms.  
You must sign the form to make it legal. Some states require you to get the form notarized. This means that a person called a  watches you sign the form and then he or she signs the form. Some states also require that two or more witnesses sign the form. Be sure to tell your family members and doctors who your health care agent is. Keep your forms in a safe place. But make sure that your loved ones know where the forms are. This could be in your desk where you keep other important papers. Make sure your doctor has a copy of your forms. Where can you learn more? Go to http://yanci-ava.info/. Enter [id number] in the search box to learn more about \"Learning About Durable Power of  for [facility]. \" Current as of: October 6, 2017 Content Version: 11.7 © 7139-3038 [employer]Metrics. Care instructions adapted under license by [employer] (which disclaims liability or warranty for this information). If you have questions about a medical condition or this instruction, always ask your healthcare professional. [place] [address] any warranty or liability for your use of this information. Nelda [address] What is a living will? A living will is a legal form you use to write down the kind of care you want at the end of your life. It is used by the health professionals who will treat you if you aren't able to decide for yourself. If you put your wishes in writing, your loved ones and others will know what kind of care you want. They won't need to guess. This can ease your mind and be helpful to others. A living will is not the same as an estate or property will. An estate will explains what you want to happen with your money and property after you die. Is a living will a legal document? A living will is a legal document. Each state has its own laws about living cavazos. If you move to another state, make sure that your living will is legal in the state where you now live.  Or you might use a universal form that has been approved by many states. This kind of form can sometimes be completed and stored online. Your electronic copy will then be available wherever you have a connection to the Internet. In most cases, doctors will respect your wishes even if you have a form from a different state. · You don't need an  to complete a living will. But legal advice can be helpful if your state's laws are unclear, your health history is complicated, or your family can't agree on what should be in your living will. · You can change your living will at any time. Some people find that their wishes about end-of-life care change as their health changes. · In addition to making a living will, think about completing a medical power of  form. This form lets you name the person you want to make end-of-life treatment decisions for you (your \"health care agent\") if you're not able to. Many hospitals and nursing homes will give you the forms you need to complete a living will and a medical power of . · Your living will is used only if you can't make or communicate decisions for yourself anymore. If you become able to make decisions again, you can accept or refuse any treatment, no matter what you wrote in your living will. · Your state may offer an online registry. This is a place where you can store your living will online so the doctors and nurses who need to treat you can find it right away. What should you think about when creating a living will? Talk about your end-of-life wishes with your family members and your doctor. Let them know what you want. That way the people making decisions for you won't be surprised by your choices. Think about these questions as you make your living will: · Do you know enough about life support methods that might be used?  If not, talk to your doctor so you know what might be done if you can't breathe on your own, your heart stops, or you're unable to swallow. · What things would you still want to be able to do after you receive life-support methods? Would you want to be able to walk? To speak? To eat on your own? To live without the help of machines? · If you have a choice, where do you want to be cared for? In your home? At a hospital or nursing home? · Do you want certain Cheondoism practices performed if you become very ill? · If you have a choice at the end of your life, where would you prefer to die? At home? In a hospital or nursing home? Somewhere else? · Would you prefer to be buried or cremated? · Do you want your organs to be donated after you die? What should you do with your living will? · Make sure that your family members and your health care agent have copies of your living will. · Give your doctor a copy of your living will to keep in your medical record. If you have more than one doctor, make sure that each one has a copy. · You may want to put a copy of your living will where it can be easily found. Where can you learn more? Go to http://yanci-ava.info/. Enter P520 in the search box to learn more about \"Learning About Living Perroy. \" Current as of: October 6, 2017 Content Version: 11.7 © 0351-5869 [employer]ileMD[employer]. Care instructions adapted under license by Notifo (which disclaims liability or warranty for this information). If you have questions about a medical condition or this instruction, always ask your healthcare professional. [place] [address] any warranty or liability for your use of this information. Advance Directives: Care Instructions Your Care Instructions An advance directive is a legal way to state your wishes at the end of your life. It tells your family and your doctor what to do if you can no longer say what you want. There are two main types of advance directives. You can change them any time that your wishes change. · A living will tells your family and your doctor your wishes about life support and other treatment. · A durable power of  for health care lets you name a person to make treatment decisions for you when you can't speak for yourself. This person is called a health care agent. If you do not have an advance directive, decisions about your medical care may be made by a doctor or a  who doesn't know you. It may help to think of an advance directive as a gift to the people who care for you. If you have one, they won't have to make tough decisions by themselves. Follow-up care is a key part of your treatment and safety. Be sure to make and go to all appointments, and call your doctor if you are having problems. It's also a good idea to know your test results and keep a list of the medicines you take. How can you care for yourself at home? · Discuss your wishes with your loved ones and your doctor. This way, there are no surprises. · Many states have a unique form. Or you might use a universal form that has been approved by many states. This kind of form can sometimes be completed and stored online. Your electronic copy will then be available wherever you have a connection to the Internet. In most cases, doctors will respect your wishes even if you have a form from a different state. · You don't need a  to do an advance directive. But you may want to get legal advice. · Think about these questions when you prepare an advance directive: ¨ Who do you want to make decisions about your medical care if you are not able to? Many people choose a family member or close friend. ¨ Do you know enough about life support methods that might be used? If not, talk to your doctor so you understand. ¨ What are you most afraid of that might happen?  You might be afraid of having pain, losing your independence, or being kept alive by machines. ¨ Where would you prefer to die? Choices include your home, a hospital, or a nursing home. ¨ Would you like to have information about hospice care to support you and your family? ¨ Do you want to donate organs when you die? ¨ Do you want certain Worship practices performed before you die? If so, put your wishes in the advance directive. · Read your advance directive every year, and make changes as needed. When should you call for help? Be sure to contact your doctor if you have any questions. Where can you learn more? Go to http://yanci-ava.info/. Enter R264 in the search box to learn more about \"Advance Directives: Care Instructions. \" Current as of: October 6, 2017 Content Version: 11.7 © 9523-0131 [employer]. Care instructions adapted under license by [employer] (which disclaims liability or warranty for this information). If you have questions about a medical condition or this instruction, always ask your healthcare professional. [address] any warranty or liability for your use of this information. Please provide this summary of care documentation to your next provider. Your primary care clinician is listed as Juan Rodríguez. If you have any questions after today's visit, please call [phone].

## 2018-08-22 NOTE — PROGRESS NOTES
Treva Almanzar is a 61 y.o. male and presents for annual Medicare Wellness Visit. Problem List: Reviewed with patient and discussed risk factors. Patient Active Problem List   Diagnosis Code    Benign hypertensive heart disease without heart failure I11.9    Pain, low back M54.5    Lymphedema I89.0    Lumbago M54.5    Thoracic or lumbosacral neuritis or radiculitis, unspecified [id number]    Sciatica M54.30    Pain in limb M79.609    Degeneration of lumbar or lumbosacral intervertebral disc M51.37    Displacement of lumbar intervertebral disc without myelopathy M51.26    Other chronic pain G89.29    Encounter for long-term (current) use of other medications Z79.899    CKD (chronic kidney disease) stage 2, GFR 60-89 ml/min N18.2    Other and unspecified hyperlipidemia E78.5    Medication refill Z76.0    H/O colonoscopy Z98.890    Anxiety F41.9    Osteoarthritis of knee M17.10    Osteoarthritis of left knee M17.12    Infected sebaceous cyst L72.3, L08.9    Back abscess L02.212    Diabetic eye exam ([facility] [address] Z01.00, E11.9    Advance directive discussed with patient Z70.80    Diabetes mellitus, stable ([address] E11.9    Severe obesity (BMI 35.0-39. 9) with comorbidity ([address] E66.01       Current medical providers:  Patient Care Team:  Violetta Crowell MD as PCP - General (Internal Medicine)  Azalia Obregon MD as Physician (Orthopedic Surgery)    PSH: Reviewed with patient  Past Surgical History:   Procedure Laterality Date    HX COLONOSCOPY      HX ORTHOPAEDIC      left elbow surgery    HX ORTHOPAEDIC Left 1/19/16    left Total Knee replacement    HX OTHER SURGICAL  02/2018    removal of abscess in back   [address] 76/11/4632    Dr. Meera Villela        SH: Reviewed with patient  Social History   Substance Use Topics    Smoking status: Never Smoker    Smokeless tobacco: Never Used      Comment: Pt counseled to continue to not smoke.     Alcohol use No       FH: Reviewed with patient  Family History   Problem Relation Age of Onset   [facility] Stroke Mother     Hypertension Mother     Seizures Mother     Diabetes Father     Heart Disease Father     Hypertension Father     Other Maternal Grandmother      tumor in abdomen       Medications/Allergies: Reviewed with patient  Current Outpatient Prescriptions on File Prior to Visit   Medication Sig Dispense Refill    oxyCODONE-acetaminophen (PERCOCET) 7.5-325 mg per tablet Take one po daily prn pain 30 Tab 0    naloxone (NARCAN) 4 mg/actuation nasal spray Use 1 spray intranasally into 1 nostril. Use a new Narcan nasal spray for subsequent doses and administer into alternating nostrils. 1 Each 1    amLODIPine-benazepril (LOTREL) 5-20 mg per capsule TAKE ONE CAPSULE BY MOUTH ONCE DAILY 90 Cap 3    cephALEXin (KEFLEX) 500 mg capsule Take 1 Cap by mouth three (3) times daily. Indications: Skin and Skin Structure Infection 30 Cap 0    atorvastatin (LIPITOR) 10 mg tablet TAKE 1 TABLET BY MOUTH DAILY 90 Tab 3    metoprolol tartrate (LOPRESSOR) 50 mg tablet TAKE 1 TABLET BY MOUTH TWICE A  Tab 1    gabapentin (NEURONTIN) 300 mg capsule TAKE 2 CAPSULES BY MOUTH EVERY MORNING AND 2 CAPSULES EVERY EVENING 360 Cap 3    FREESTYLE LITE STRIPS strip Check fasting sugars daily before breakfast. BRAND MEDICALLY NECESSARY. FOR FREESTYLE LITE METER 100 Strip 11    Lancets misc Check fasting sugars daily before breakfast. BRAND MEDICALLY NECESSARY. FOR FREESTYLE LITE METER 100 Each 11    furosemide (LASIX) 20 mg tablet Take one po daily prn swelling 30 Tab 1    FREESTYLE LITE METER monitoring kit Check fasting sugars daily before breakfast. BRAND MEDICALLY NECESSARY 1 Kit 0    Aspirin, Buffered 81 mg tab Take 81 mg by mouth daily. No current facility-administered medications on file prior to visit.        No Known Allergies    Objective:  Visit Vitals    BP (!) 140/108 (BP 1 Location: Left arm, BP Patient Position: Sitting)    Pulse 66    Temp 97.4 °F (36.3 °C) (Oral)    Resp 18    Ht 5' 7\" (1.702 m)    Wt 238 lb 12.8 oz (108.3 kg)    SpO2 97%    BMI 37.4 kg/m2    Body mass index is 37.4 kg/(m^2). Assessment of cognitive impairment: Alert and oriented x 3    Depression Screen:   PHQ over the last two weeks 5/22/2018   Little interest or pleasure in doing things Not at all   Feeling down, depressed, irritable, or hopeless Not at all   Total Score PHQ 2 0       Fall Risk Assessment:  No flowsheet data found. Functional Ability:   Does the patient exhibit a steady gait? yes   How long did it take the patient to get up and walk from a sitting position? 5 seconds   Is the patient self reliant?  (ie can do own laundry, meals, household chores)  yes     Does the patient handle his/her own medications? yes     Does the patient handle his/her own money? yes     Is the patients home safe (ie good lighting, handrails on stairs and bath, etc.)? yes     Did you notice or did patient express any hearing difficulties? no     Did you notice or did patient express any vision difficulties?   no     Were distance and reading eye charts used? no       Advance Care Planning:   Patient was offered the opportunity to discuss advance care planning:  yes     Does patient have an Advance Directive:  no   If no, did you provide information on Caring Connections? yes       Plan:      No orders of the defined types were placed in this encounter.       Health Maintenance   Topic Date Due    EYE EXAM RETINAL OR DILATED Q1  09/09/2017    FOOT EXAM Q1  01/25/2018    MEDICARE YEARLY EXAM  04/25/2018    Influenza Age 9 to Adult  08/01/2018    COLONOSCOPY  12/02/2018    HEMOGLOBIN A1C Q6M  11/22/2018    MICROALBUMIN Q1  02/22/2019    LIPID PANEL Q1  02/22/2019    DTaP/Tdap/Td series (2 - Td) 04/24/2027    Hepatitis C Screening  Completed    Pneumococcal 19-64 Medium Risk  Completed       *Patient verbalized understanding and agreement with the plan. A copy of the After Visit Summary with personalized health plan was given to the patient today. ·     Objective:   Physical exam:   Visit Vitals    BP (!) 150/98 (BP 1 Location: Left arm, BP Patient Position: Sitting)  Comment: pt did not take his BP meds this AM.    Pulse 66    Temp 97.4 °F (36.3 °C) (Oral)    Resp 18    Ht 5' 7\" (1.702 m)    Wt 238 lb 12.8 oz (108.3 kg)    SpO2 97%    BMI 37.4 kg/m2        Generally: Pleasant male in no acute distress  Cardiac Exam: regular, rate, and rhythm. Normal S1 and S2. No murmurs, gallops, or rubs  Pulmonary exam: Clear to auscultation bilaterally  Abdominal exam: Positive bowel sounds in all four quadrants, soft, nondistended, nontender  Extremities: 2+ dorsalis pedis pulses bilaterally. No pedal edema    bilaterally    LABS/Radiological Tests:  Lab Results   Component Value Date/Time    WBC 5.8 03/12/2018 12:06 PM    HGB 15.6 03/12/2018 12:06 PM    HCT 46.4 03/12/2018 12:06 PM    PLATELET 129 80/58/1903 12:06 PM     Lab Results   Component Value Date/Time    Sodium 138 05/22/2018 10:13 AM    Potassium 3.9 05/22/2018 10:13 AM    Chloride 101 05/22/2018 10:13 AM    CO2 28 05/22/2018 10:13 AM    Glucose 148 (H) 05/22/2018 10:13 AM    BUN 17 05/22/2018 10:13 AM    Creatinine 1.18 05/22/2018 10:13 AM     Lab Results   Component Value Date/Time    Cholesterol, total 131 02/22/2018 09:34 AM    HDL Cholesterol 35 (L) 02/22/2018 09:34 AM    LDL, calculated 78 02/22/2018 09:34 AM    Triglyceride 90 02/22/2018 09:34 AM     No results found for: GPT  Component      Latest Ref Rng & Units 5/22/2018          10:13 AM   Hemoglobin A1c, (calculated)      4.2 - 5.6 % 6.9 (H)   Est. average glucose      mg/dL 151   Previous labs      ASSESSMENT/PLAN:    1. Encounter for Medicare annual wellness exam    2. Diabetes mellitus, stable ([address]): we will see what the labs show.  Continue diet, exercise and check sugars daily before breakfast.  -     HEMOGLOBIN A1C WITH EAG; Future    3. Benign hypertension without congestive heart failure: not well controlled. He did not take his BP med this AM. He will take his BP meds ASAP. Continue the lopressor and lotrel and lasix and diet and exercise. 4. Dyslipidemia: we will see what the labs show. Continue the lipitor, diet and exercise. -     METABOLIC PANEL, COMPREHENSIVE; Future  -     LIPID PANEL; Future  . 5. Medication refill: UDS:5/22/18 pain agreement signed on : 7/25/18. Checked  and no aberrancies seen. -     oxyCODONE-acetaminophen (PERCOCET) 7.5-325 mg per tablet; Take one po daily prn pain    6. Other chronic pain  -     oxyCODONE-acetaminophen (PERCOCET) 7.5-325 mg per tablet; Take one po daily prn pain      7. Requested Prescriptions     Signed Prescriptions Disp Refills    oxyCODONE-acetaminophen (PERCOCET) 7.5-325 mg per tablet 30 Tab 0     Sig: Take one po daily prn pain       8. Patient verbalized understanding and agreement with the plan. 9. Patient was given an after-visit summary. 10. Follow-up Disposition:  Return in about 3 months (around 11/22/2018) for f/u DM/HTN/lipids. or sooner if worsening symptoms.                 Max Mejia MD

## 2018-09-24 DIAGNOSIS — G89.29 OTHER CHRONIC PAIN: ICD-10-CM

## 2018-09-24 DIAGNOSIS — Z76.0 MEDICATION REFILL: ICD-10-CM

## 2018-09-24 NOTE — TELEPHONE ENCOUNTER
Patient request, last OV 8/22/18, next visit 11/15/18    Requested Prescriptions     Pending Prescriptions Disp Refills    oxyCODONE-acetaminophen (PERCOCET) 7.5-325 mg per tablet 30 Tab 0     Sig: Take one po daily prn pain

## 2018-09-25 RX ORDER — OXYCODONE AND ACETAMINOPHEN 7.5; 325 MG/1; MG/1
TABLET ORAL
Qty: 30 TAB | Refills: 0 | Status: SHIPPED | OUTPATIENT
Start: 2018-09-25 | End: 2018-10-24 | Stop reason: SDUPTHER

## 2018-09-25 NOTE — TELEPHONE ENCOUNTER
Checked  and no aberrancies seen. Printed rx for:    Requested Prescriptions     Signed Prescriptions Disp Refills    oxyCODONE-acetaminophen (PERCOCET) 7.5-325 mg per tablet 30 Tab 0     Sig: Take one po daily prn pain     Authorizing Provider: Sanna Williamson     Please let pt know that this is ready for .

## 2018-09-25 NOTE — TELEPHONE ENCOUNTER
PCP: Laurel Smith MD    Last appt: 8/22/2018  Future Appointments  Date Time Provider Shirin Muñoz   11/15/2018 9:45 AM Juan Valente MD [facility] SCHED       Requested Prescriptions     Pending Prescriptions Disp Refills    oxyCODONE-acetaminophen (PERCOCET) 7.5-325 mg per tablet 30 Tab 0     Sig: Take one po daily prn pain       Request for a 30 or 90 day supply? Provider Discretion    Pharmacy: [facility] [address]    Other Comments:   printed and placed in PCP medication refill review folder.      Last UDS date: 05/22/2018  Pain contract signed: 08/20/2018

## 2018-10-24 DIAGNOSIS — Z76.0 MEDICATION REFILL: ICD-10-CM

## 2018-10-24 DIAGNOSIS — G89.29 OTHER CHRONIC PAIN: ICD-10-CM

## 2018-10-24 NOTE — TELEPHONE ENCOUNTER
Patient request, last OV 8/22/18, next scheduled 11/15/18.     Requested Prescriptions     Pending Prescriptions Disp Refills    oxyCODONE-acetaminophen (PERCOCET) 7.5-325 mg per tablet 30 Tab 0     Sig: Take one po daily prn pain

## 2018-10-25 RX ORDER — OXYCODONE AND ACETAMINOPHEN 7.5; 325 MG/1; MG/1
TABLET ORAL
Qty: 30 TAB | Refills: 0 | Status: SHIPPED | OUTPATIENT
Start: 2018-10-25 | End: 2018-11-26 | Stop reason: SDUPTHER

## 2018-10-25 NOTE — TELEPHONE ENCOUNTER
PCP: Caron Malik MD    Last appt: 8/22/2018  Future Appointments   Date Time Provider Shirin Muñoz   11/2/2018 10:00 AM Chica Rodgers MD [facility] SCHED   11/15/2018  9:45 AM Balwinder Rodríguez MD [facility] SCHED       Requested Prescriptions     Pending Prescriptions Disp Refills    oxyCODONE-acetaminophen (PERCOCET) 7.5-325 mg per tablet 30 Tab 0     Sig: Take one po daily prn pain       Request for a 30 or 90 day supply? Provider Discretion    Pharmacy: [facility]    Other Comments:   printed and placed in PCP medication refill review folder.      Last UDS date: 05/22/2018  Pain contract signed: 07/25/2018

## 2018-10-25 NOTE — TELEPHONE ENCOUNTER
Checked  and no aberrancies seen. Printed rx for:  Requested Prescriptions     Signed Prescriptions Disp Refills    oxyCODONE-acetaminophen (PERCOCET) 7.5-325 mg per tablet 30 Tab 0     Sig: Take one po daily prn pain     Authorizing Provider: Archana Bowden       Please let pt know that this is ready for .

## 2018-11-02 ENCOUNTER — OFFICE VISIT (OUTPATIENT)
Dept: ORTHOPEDIC SURGERY | Facility: CLINIC | Age: 58
End: 2018-11-02

## 2018-11-02 VITALS
DIASTOLIC BLOOD PRESSURE: 98 MMHG | WEIGHT: 232 LBS | RESPIRATION RATE: 16 BRPM | HEIGHT: 67 IN | TEMPERATURE: 97.8 F | HEART RATE: 60 BPM | BODY MASS INDEX: 36.41 KG/M2 | OXYGEN SATURATION: 94 % | SYSTOLIC BLOOD PRESSURE: 160 MMHG

## 2018-11-02 DIAGNOSIS — G89.29 CHRONIC PAIN OF RIGHT KNEE: ICD-10-CM

## 2018-11-02 DIAGNOSIS — M25.461 EFFUSION OF BURSA OF RIGHT KNEE: ICD-10-CM

## 2018-11-02 DIAGNOSIS — M25.561 CHRONIC PAIN OF RIGHT KNEE: ICD-10-CM

## 2018-11-02 DIAGNOSIS — M17.11 PRIMARY OSTEOARTHRITIS OF RIGHT KNEE: Primary | ICD-10-CM

## 2018-11-02 RX ORDER — BUPIVACAINE HYDROCHLORIDE 2.5 MG/ML
4 INJECTION, SOLUTION EPIDURAL; INFILTRATION; INTRACAUDAL ONCE
Qty: 4 ML | Refills: 0
Start: 2018-11-02 | End: 2018-11-02

## 2018-11-02 RX ORDER — DICLOFENAC SODIUM 10 MG/G
GEL TOPICAL 4 TIMES DAILY
COMMUNITY

## 2018-11-02 RX ORDER — BETAMETHASONE SODIUM PHOSPHATE AND BETAMETHASONE ACETATE 3; 3 MG/ML; MG/ML
6 INJECTION, SUSPENSION INTRA-ARTICULAR; INTRALESIONAL; INTRAMUSCULAR; SOFT TISSUE ONCE
Qty: 0.5 ML | Refills: 0
Start: 2018-11-02 | End: 2018-11-02

## 2018-11-02 RX ORDER — ACETAMINOPHEN 500 MG
TABLET ORAL
COMMUNITY
End: 2019-01-21

## 2018-11-02 NOTE — PROGRESS NOTES
Patient: Jadyn Hester                MRN: [id number]       SSN: [id number] YOB: 1960        AGE: 61 y.o. SEX: male PCP: Cherie Sumner MD 
11/02/18 Chief Complaint Patient presents with  Knee Pain Right knee pain HISTORY:  Jdayn Hester is a 61 y.o. male who is seen for increased right knee pain. He states his right pain is so severe the he does not feel like doing anything. His pains are disabling. He experiences pain with walking and standing. He has a h/o knee wear-n-tear from many years of playing football and basketball. He wakes from his sleep due to his pains. He is s/p left TKR on 1/19/16 with great benefit. He underwent I&D of a subQ thoracic abscess by Dr. Michael Alvarez recently. He is interested in a knee replacement once he loses further weight since hs is a diabetic. He did not respond to a Supartz series done by Dr. Anton Wong. Pain Assessment  11/2/2018 Location of Pain Knee Location Modifiers Right Severity of Pain 9 Quality of Pain Grinding; Throbbing; [place]; Aching Duration of Pain Persistent Frequency of Pain Constant Aggravating Factors Standing;Walking;Bending;Stairs;Squatting Aggravating Factors Comment - Limiting Behavior -  
Relieving Factors Other (Comment) Relieving Factors Comment Pain medication Result of Injury No  
 
Occupation, etc:  Mr. Ana Quintanilla receives social security disability benefits for knee pain. 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 He stopped working in 2007 due to his knee pains. He is a diet controlled diabetic. He rides the stationary bicycle for exercise. He states he can exercise at the [employer] facility. He has a h/o lymphedema in the right lower extremity. He reports that his weight fluctuates. He denies a family h/o osteoarthritis. Lab Results Component Value Date/Time  Hemoglobin A1c 7.1 (H) 08/22/2018 10:05 AM  
 Hemoglobin A1c (POC) 6.1 12/20/2013 11:29 AM  
 
 Last 3 Recorded Weights in this Encounter 11/02/18 1002 Weight: 232 lb (105.2 kg) Body mass index is 36.34 kg/m². Patient Active Problem List  
Diagnosis Code  Benign hypertensive heart disease without heart failure I11.9  
 Pain, low back M54.5  Lymphedema I89.0  Lumbago M54.5  Thoracic or lumbosacral neuritis or radiculitis, unspecified [id number]  Sciatica M54.30  Pain in limb M79.609  Degeneration of lumbar or lumbosacral intervertebral disc M51.37  
 Displacement of lumbar intervertebral disc without myelopathy M51.26  
 Other chronic pain G89.29  
 Encounter for long-term (current) use of other medications Z79.899  
 CKD (chronic kidney disease) stage 2, GFR 60-89 ml/min N18.2  Other and unspecified hyperlipidemia E78.5  Medication refill Z76.0  
 H/O colonoscopy Z98.890  
 Anxiety F41.9  Osteoarthritis of knee M17.10  
 Osteoarthritis of left knee M17.12  Infected sebaceous cyst L72.3, L08.9  Back abscess L02.212  
 Diabetic eye exam ([address] Z01.00, E11.9  Advance directive discussed with patient Z70.80  
 Diabetes mellitus, stable ([address] E11.9  Severe obesity (BMI 35.0-39. 9) with comorbidity ([address] E66.01  
 
REVIEW OF SYSTEMS: All Below are Negative except: See HPI Constitutional: negative for fever, chills, and weight loss. Cardiovascular: negative for chest pain, claudication, leg swelling, SOB, ALAN Gastrointestinal: Negative for pain, N/V/C/D, Blood in stool or urine, dysuria,  hematuria, incontinence, pelvic pain. Musculoskeletal: See HPI Neurological: Negative for dizziness and weakness. Negative for headaches, Visual changes, confusion, seizures Phychiatric/Behavioral: Negative for depression, memory loss, substance  abuse. Extremities: Negative for hair changes, rash, or skin lesion changes. Hematologic: Negative for bleeding problems, bruising, pallor or swollen lymph  nodes Peripheral Vascular: No calf pain, no circulation deficits. Social History Socioeconomic History  Marital status:  Spouse name: Not on file  Number of children: Not on file  Years of education: Not on file  Highest education level: Not on file Social Needs  Financial resource strain: Not on file  Food insecurity - worry: Not on file  Food insecurity - inability: Not on file  Transportation needs - medical: Not on file  Transportation needs - non-medical: Not on file Occupational History  Not on file Tobacco Use  Smoking status: Never Smoker  Smokeless tobacco: Never Used  Tobacco comment: Pt counseled to continue to not smoke. Substance and Sexual Activity  Alcohol use: No  
  Alcohol/week: 0.0 oz  Drug use: No  
 Sexual activity: Yes  
  Partners: Female Birth control/protection: Condom Other Topics Concern  Not on file Social History Narrative  Not on file No Known Allergies Current Outpatient Medications Medication Sig  
 diclofenac (VOLTAREN) 1 % gel Apply  to affected area four (4) times daily.  acetaminophen (TYLENOL EXTRA STRENGTH) 500 mg tablet Take  by mouth every six (6) hours as needed for Pain.  oxyCODONE-acetaminophen (PERCOCET) 7.5-325 mg per tablet Take one po daily prn pain  
 naloxone (NARCAN) 4 mg/actuation nasal spray Use 1 spray intranasally into 1 nostril. Use a new Narcan nasal spray for subsequent doses and administer into alternating nostrils.  amLODIPine-benazepril (LOTREL) 5-20 mg per capsule TAKE ONE CAPSULE BY MOUTH ONCE DAILY  atorvastatin (LIPITOR) 10 mg tablet TAKE 1 TABLET BY MOUTH DAILY  metoprolol tartrate (LOPRESSOR) 50 mg tablet TAKE 1 TABLET BY MOUTH TWICE A DAY  gabapentin (NEURONTIN) 300 mg capsule TAKE 2 CAPSULES BY MOUTH EVERY MORNING AND 2 CAPSULES EVERY EVENING  
 FREESTYLE LITE STRIPS strip Check fasting sugars daily before breakfast. BRAND MEDICALLY NECESSARY. FOR FREESTYLE LITE METER  Lancets misc Check fasting sugars daily before breakfast. BRAND MEDICALLY NECESSARY. FOR FREESTYLE LITE METER  
 furosemide (LASIX) 20 mg tablet Take one po daily prn swelling  FREESTYLE LITE METER monitoring kit Check fasting sugars daily before breakfast. BRAND MEDICALLY NECESSARY  Aspirin, Buffered 81 mg tab Take 81 mg by mouth daily.  cephALEXin (KEFLEX) 500 mg capsule Take 1 Cap by mouth three (3) times daily. Indications: Skin and Skin Structure Infection No current facility-administered medications for this visit. PHYSICAL EXAMINATION: 
Visit Vitals BP (!) 160/98 (BP 1 Location: Left arm, BP Patient Position: Sitting) Pulse 60 Temp 97.8 °F (36.6 °C) (Oral) Resp 16 Ht 5' 7\" (1.702 m) Wt 232 lb (105.2 kg) SpO2 94% BMI 36.34 kg/m² ORTHO EXAMINATION: 
Examination Left knee Right knee Skin Intact, well-healed anterior incision site Intact Range of motion 115-0 115-0 Effusion - 2+ Medial joint line tenderness - + Lateral joint line tenderness - - Popliteal tenderness - -  
Osteophytes palpable - - Lavons - - Patella crepitus - + Anterior drawer - - Lateral laxity - - Medial laxity - 1+ Varus deformity - +, severe Valgus deformity - - Pretibial edema - + Calf tenderness - - Wearing compression stockings PROCEDURE: After timeout and under sterile conditions, right knee aspirated minimal cc of light yellow fluid. The fluid was discarded. After discussing treatment options, patient's left knee was injected with 4 cc Marcaine and 1/2 cc Celestone. Chart reviewed for the following: 
 Mart Weir MD, have reviewed the History, Physical and updated the Allergic reactions for Alta Harder TIME OUT performed immediately prior to start of procedure: 
Mart Weir MD, have performed the following reviews on Alta Harder prior to the start of the procedure: * Patient was identified by name and date of birth * Agreement on procedure being performed was verified * Risks and Benefits explained to the patient * Procedure site verified and marked as necessary * Patient was positioned for comfort * Consent was obtained Time: 10:24 AM  
 
Date of procedure: 11/2/2018 Procedure performed by:  Benjamin Sebastian MD 
 
Mr. Wade tolerated the procedure well with no complications. RADIOGRAPHS: 
XR LEFT KNEE 1/29/16 IMPRESSION:  No fractures, severe end stage osteoarthritic changes affecting the medial compartment, 6 degree mechanical femoral shaft valgus angle. XR RIGHT KNEE 2/18/14 Impression: 1. Severe tricompartmental osteoarthritis. Complete loss of articular cartilage in medial compartment. 2. Maceration of posterior horn and body medial meniscus. Anterior horn is severely subluxed. Normal lateral meniscus. 3. High grade anterior cruciate ligament tear. Grade 2 medial retinacular ligament tear. 4. Mild grade 2 patellar chondromalacia. 5. Large joint effusion with innumerable joint bodies within the suprapatellar bursa, posterior joint and semimembranosus-gastrocnemius bursa. Popliteal cyst. 
 
XR RIGHT KNEE 2/12/14 IMPRESSION:  Tricompartmental osteoarthritis, most prominent in the medial compartment with 
mild varus angulation. Suspect intra-articular osseous loose bodies and ACL tear. IMPRESSION:   
  ICD-10-CM ICD-9-CM 1. Primary osteoarthritis of right knee M17.11 715.16 betamethasone (CELESTONE SOLUSPAN) 6 mg/mL injection BETAMETHASONE ACETATE & SODIUM PHOSPHATE INJECTION 3 MG EA.  
   DRAIN/INJECT LARGE JOINT/BURSA  
   bupivacaine, PF, (MARCAINE, PF,) 0.25 % (2.5 mg/mL) injection 2. Chronic pain of right knee M25.561 719.46 betamethasone (CELESTONE SOLUSPAN) 6 mg/mL injection G89.29 338.29 BETAMETHASONE ACETATE & SODIUM PHOSPHATE INJECTION 3 MG EA.  
   DRAIN/INJECT LARGE JOINT/BURSA bupivacaine, PF, (MARCAINE, PF,) 0.25 % (2.5 mg/mL) injection 3. Effusion of bursa of right knee M25.461 719.06 betamethasone (CELESTONE SOLUSPAN) 6 mg/mL injection BETAMETHASONE ACETATE & SODIUM PHOSPHATE INJECTION 3 MG EA.  
   DRAIN/INJECT LARGE JOINT/BURSA  
   bupivacaine, PF, (MARCAINE, PF,) 0.25 % (2.5 mg/mL) injection PLAN:   After timeout and under sterile conditions, right knee aspirated 1-2 cc of light yellow fluid. After discussing treatment options, patient's right knee was injected with 4 cc Marcaine and 1/2 cc Celestone. We discussed possible need for right knee arthroplasty at some time in the future pending weight loss below <#190. He will follow up as needed. Continue weight loss--goal at least 190# or less.    
 
Scribed by Shaunna Hart ([address]) as dictated by Marilee Saeed MD

## 2018-11-15 ENCOUNTER — OFFICE VISIT (OUTPATIENT)
Dept: INTERNAL MEDICINE CLINIC | Age: 58
End: 2018-11-15

## 2018-11-15 ENCOUNTER — HOSPITAL ENCOUNTER (OUTPATIENT)
Dept: LAB | Age: 58
Discharge: HOME | End: 2018-11-15
Payer: MEDICARE

## 2018-11-15 VITALS
SYSTOLIC BLOOD PRESSURE: 142 MMHG | RESPIRATION RATE: 14 BRPM | OXYGEN SATURATION: 97 % | BODY MASS INDEX: 36.73 KG/M2 | DIASTOLIC BLOOD PRESSURE: 98 MMHG | TEMPERATURE: 97.8 F | HEIGHT: 67 IN | WEIGHT: 234 LBS | HEART RATE: 59 BPM

## 2018-11-15 DIAGNOSIS — E78.5 DYSLIPIDEMIA: ICD-10-CM

## 2018-11-15 DIAGNOSIS — G89.29 ENCOUNTER FOR CHRONIC PAIN MANAGEMENT: ICD-10-CM

## 2018-11-15 DIAGNOSIS — E11.9 DIABETES MELLITUS, STABLE (HCC): Primary | ICD-10-CM

## 2018-11-15 DIAGNOSIS — I10 BENIGN HYPERTENSION WITHOUT CHF: ICD-10-CM

## 2018-11-15 DIAGNOSIS — E11.9 DIABETES MELLITUS, STABLE (HCC): ICD-10-CM

## 2018-11-15 DIAGNOSIS — Z23 ENCOUNTER FOR IMMUNIZATION: ICD-10-CM

## 2018-11-15 LAB
ALBUMIN SERPL-MCNC: 3.7 G/DL (ref 3.4–5)
ALBUMIN/GLOB SERPL: 1.1 {RATIO} (ref 0.8–1.7)
ALP SERPL-CCNC: 105 U/L (ref 45–117)
ALT SERPL-CCNC: 41 U/L (ref 16–61)
ANION GAP SERPL CALC-SCNC: 8 MMOL/L (ref 3–18)
AST SERPL-CCNC: 20 U/L (ref 15–37)
BILIRUB SERPL-MCNC: 0.6 MG/DL (ref 0.2–1)
BUN SERPL-MCNC: 15 MG/DL (ref 7–18)
BUN/CREAT SERPL: 12 (ref 12–20)
CALCIUM SERPL-MCNC: 8.7 MG/DL (ref 8.5–10.1)
CHLORIDE SERPL-SCNC: 102 MMOL/L (ref 100–108)
CHOLEST SERPL-MCNC: 142 MG/DL
CO2 SERPL-SCNC: 30 MMOL/L (ref 21–32)
CREAT SERPL-MCNC: 1.28 MG/DL (ref 0.6–1.3)
EST. AVERAGE GLUCOSE BLD GHB EST-MCNC: 163 MG/DL
GLOBULIN SER CALC-MCNC: 3.3 G/DL (ref 2–4)
GLUCOSE SERPL-MCNC: 123 MG/DL (ref 74–99)
HBA1C MFR BLD: 7.3 % (ref 4.2–5.6)
HDLC SERPL-MCNC: 42 MG/DL (ref 40–60)
HDLC SERPL: 3.4 {RATIO} (ref 0–5)
LDLC SERPL CALC-MCNC: 81.2 MG/DL (ref 0–100)
LIPID PROFILE,FLP: NORMAL
POTASSIUM SERPL-SCNC: 4.2 MMOL/L (ref 3.5–5.5)
PROT SERPL-MCNC: 7 G/DL (ref 6.4–8.2)
SODIUM SERPL-SCNC: 140 MMOL/L (ref 136–145)
TRIGL SERPL-MCNC: 94 MG/DL (ref ?–150)
VLDLC SERPL CALC-MCNC: 18.8 MG/DL

## 2018-11-15 PROCEDURE — 36415 COLL VENOUS BLD VENIPUNCTURE: CPT

## 2018-11-15 PROCEDURE — 80053 COMPREHEN METABOLIC PANEL: CPT

## 2018-11-15 PROCEDURE — 80061 LIPID PANEL: CPT

## 2018-11-15 PROCEDURE — G0480 DRUG TEST DEF 1-7 CLASSES: HCPCS

## 2018-11-15 PROCEDURE — 83036 HEMOGLOBIN GLYCOSYLATED A1C: CPT

## 2018-11-15 NOTE — PATIENT INSTRUCTIONS
1) Follow-up in 3 months or sooner if worsening symptoms. 2) Work on diet and exercise. 3) Try water aerobics. Learning About Diabetes Food Guidelines  Your Care Instructions    Meal planning is important to manage diabetes. It helps keep your blood sugar at a target level (which you set with your doctor). You don't have to eat special foods. You can eat what your family eats, including sweets once in a while. But you do have to pay attention to how often you eat and how much you eat of certain foods. You may want to work with a dietitian or a certified diabetes educator (CDE) to help you plan meals and snacks. A dietitian or CDE can also help you lose weight if that is one of your goals. What should you know about eating carbs? Managing the amount of carbohydrate (carbs) you eat is an important part of healthy meals when you have diabetes. Carbohydrate is found in many foods. · Learn which foods have carbs. And learn the amounts of carbs in different foods. ? Bread, cereal, pasta, and rice have about 15 grams of carbs in a serving. A serving is 1 slice of bread (1 ounce), ½ cup of cooked cereal, or 1/3 cup of cooked pasta or rice. ? Fruits have 15 grams of carbs in a serving. A serving is 1 small fresh fruit, such as an apple or orange; ½ of a banana; ½ cup of cooked or canned fruit; ½ cup of fruit juice; 1 cup of melon or raspberries; or 2 tablespoons of dried fruit. ? Milk and no-sugar-added yogurt have 15 grams of carbs in a serving. A serving is 1 cup of milk or 2/3 cup of no-sugar-added yogurt. ? Starchy vegetables have 15 grams of carbs in a serving. A serving is ½ cup of mashed potatoes or sweet potato; 1 cup winter squash; ½ of a small baked potato; ½ cup of cooked beans; or ½ cup cooked corn or green peas. · Learn how much carbs to eat each day and at each meal. A dietitian or CDE can teach you how to keep track of the amount of carbs you eat.  This is called carbohydrate counting. · If you are not sure how to count carbohydrate grams, use the Plate Method to plan meals. It is a good, quick way to make sure that you have a balanced meal. It also helps you spread carbs throughout the day. ? Divide your plate by types of foods. Put non-starchy vegetables on half the plate, meat or other protein food on one-quarter of the plate, and a grain or starchy vegetable in the final quarter of the plate. To this you can add a small piece of fruit and 1 cup of milk or yogurt, depending on how many carbs you are supposed to eat at a meal.  · Try to eat about the same amount of carbs at each meal. Do not \"save up\" your daily allowance of carbs to eat at one meal.  · Proteins have very little or no carbs per serving. Examples of proteins are beef, chicken, turkey, fish, eggs, tofu, cheese, cottage cheese, and peanut butter. A serving size of meat is 3 ounces, which is about the size of a deck of cards. Examples of meat substitute serving sizes (equal to 1 ounce of meat) are 1/4 cup of cottage cheese, 1 egg, 1 tablespoon of peanut butter, and ½ cup of tofu. How can you eat out and still eat healthy? · Learn to estimate the serving sizes of foods that have carbohydrate. If you measure food at home, it will be easier to estimate the amount in a serving of restaurant food. · If the meal you order has too much carbohydrate (such as potatoes, corn, or baked beans), ask to have a low-carbohydrate food instead. Ask for a salad or green vegetables. · If you use insulin, check your blood sugar before and after eating out to help you plan how much to eat in the future. · If you eat more carbohydrate at a meal than you had planned, take a walk or do other exercise. This will help lower your blood sugar. What else should you know? · Limit saturated fat, such as the fat from meat and dairy products. This is a healthy choice because people who have diabetes are at higher risk of heart disease.  So choose lean cuts of meat and nonfat or low-fat dairy products. Use olive or canola oil instead of butter or shortening when cooking. · Don't skip meals. Your blood sugar may drop too low if you skip meals and take insulin or certain medicines for diabetes. · Check with your doctor before you drink alcohol. Alcohol can cause your blood sugar to drop too low. Alcohol can also cause a bad reaction if you take certain diabetes medicines. Follow-up care is a key part of your treatment and safety. Be sure to make and go to all appointments, and call your doctor if you are having problems. It's also a good idea to know your test results and keep a list of the medicines you take. Where can you learn more? Go to http://yanci-ava.info/. Enter C220 in the search box to learn more about \"Learning About Diabetes Food Guidelines. \"  Current as of: December 7, 2017  Content Version: 11.8  © 1900-1384 [employer]. Care instructions adapted under license by [employer] (which disclaims liability or warranty for this information). If you have questions about a medical condition or this instruction, always ask your healthcare professional. [place] [address] any warranty or liability for your use of this information. Learning About Meal Planning for Diabetes  Why plan your meals? Meal planning can be a key part of managing diabetes. Planning meals and snacks with the right balance of carbohydrate, protein, and fat can help you keep your blood sugar at the target level you set with your doctor. You don't have to eat special foods. You can eat what your family eats, including sweets once in a while. But you do have to pay attention to how often you eat and how much you eat of certain foods. You may want to work with a dietitian or a certified diabetes educator. He or she can give you tips and meal ideas and can answer your questions about meal planning.  This health professional can also help you reach a healthy weight if that is one of your goals. What plan is right for you? Your dietitian or diabetes educator may suggest that you start with the plate format or carbohydrate counting. The plate format  The plate format is a simple way to help you manage how you eat. You plan meals by learning how much space each food should take on a plate. Using the plate format helps you spread carbohydrate throughout the day. It can make it easier to keep your blood sugar level within your target range. It also helps you see if you're eating healthy portion sizes. To use the plate format, you put non-starchy vegetables on half your plate. Add meat or meat substitutes on one-quarter of the plate. Put a grain or starchy vegetable (such as brown rice or a potato) on the final quarter of the plate. You can add a small piece of fruit and some low-fat or fat-free milk or yogurt, depending on your carbohydrate goal for each meal.  Here are some tips for using the plate format:  · Make sure that you are not using an oversized plate. A 9-inch plate is best. Many restaurants use larger plates. · Get used to using the plate format at home. Then you can use it when you eat out. · Write down your questions about using the plate format. Talk to your doctor, a dietitian, or a diabetes educator about your concerns. Carbohydrate counting  With carbohydrate counting, you plan meals based on the amount of carbohydrate in each food. Carbohydrate raises blood sugar higher and more quickly than any other nutrient. It is found in desserts, breads and cereals, and fruit. It's also found in starchy vegetables such as potatoes and corn, grains such as rice and pasta, and milk and yogurt. Spreading carbohydrate throughout the day helps keep your blood sugar levels within your target range.   Your daily amount depends on several things, including your weight, how active you are, which diabetes medicines you take, and what your goals are for your blood sugar levels. A registered dietitian or diabetes educator can help you plan how much carbohydrate to include in each meal and snack. A guideline for your daily amount of carbohydrate is:  · 45 to 60 grams at each meal. That's about the same as 3 to 4 carbohydrate servings. · 15 to 20 grams at each snack. That's about the same as 1 carbohydrate serving. The Nutrition Facts label on packaged foods tells you how much carbohydrate is in a serving of the food. First, look at the serving size on the food label. Is that the amount you eat in a serving? All of the nutrition information on a food label is based on that serving size. So if you eat more or less than that, you'll need to adjust the other numbers. Total carbohydrate is the next thing you need to look for on the label. If you count carbohydrate servings, one serving of carbohydrate is 15 grams. For foods that don't come with labels, such as fresh fruits and vegetables, you'll need a guide that lists carbohydrate in these foods. Ask your doctor, dietitian, or diabetes educator about books or other nutrition guides you can use. If you take insulin, you need to know how many grams of carbohydrate are in a meal. This lets you know how much rapid-acting insulin to take before you eat. If you use an insulin pump, you get a constant rate of insulin during the day. So the pump must be programmed at meals to give you extra insulin to cover the rise in blood sugar after meals. When you know how much carbohydrate you will eat, you can take the right amount of insulin. Or, if you always use the same amount of insulin, you need to make sure that you eat the same amount of carbohydrate at meals. If you need more help to understand carbohydrate counting and food labels, ask your doctor, dietitian, or diabetes educator. How do you get started with meal planning?   Here are some tips to get started:  · Plan your meals a week at a time. Don't forget to include snacks too. · Use cookbooks or online recipes to plan several main meals. Plan some quick meals for busy nights. You also can double some recipes that freeze well. Then you can save half for other busy nights when you don't have time to cook. · Make sure you have the ingredients you need for your recipes. If you're running low on basic items, put these items on your shopping list too. · List foods that you use to make breakfasts, lunches, and snacks. List plenty of fruits and vegetables. · Post this list on the refrigerator. Add to it as you think of more things you need. · Take the list to the store to do your weekly shopping. Follow-up care is a key part of your treatment and safety. Be sure to make and go to all appointments, and call your doctor if you are having problems. It's also a good idea to know your test results and keep a list of the medicines you take. Where can you learn more? Go to http://yanci-ava.info/. Katie Corado in the search box to learn more about \"Learning About Meal Planning for Diabetes. \"  Current as of: December 7, 2017  Content Version: 11.8  © 1949-0886 [employer]. Care instructions adapted under license by [employer] (which disclaims liability or warranty for this information). If you have questions about a medical condition or this instruction, always ask your healthcare professional. [address] any warranty or liability for your use of this information. Nutrition Tips for Diabetes: After Your Visit  Your Care Instructions  A healthy diet is important to manage diabetes. It helps you lose weight (if you need to) and keep it off. It gives you the nutrition and energy your body needs and helps prevent heart disease. But a diet for diabetes does not mean that you have to eat special foods. You can eat what your family eats, including occasional sweets and other favorites.  But you do have to pay attention to how often you eat and how much you eat of certain foods. The right plan for you will give you meals that help you keep your blood sugar at healthy levels. Try to eat a variety of foods and to spread carbohydrate throughout the day. Carbohydrate raises blood sugar higher and more quickly than any other nutrient does. Carbohydrate is found in sugar, breads and cereals, fruit, starchy vegetables such as potatoes and corn, and milk and yogurt. You may want to work with a dietitian or diabetes educator to help you plan meals and snacks. A dietitian or diabetes educator also can help you lose weight if that is one of your goals. The following tips can help you enjoy your meals and stay healthy. Follow-up care is a key part of your treatment and safety. Be sure to make and go to all appointments, and call your doctor if you are having problems. Its also a good idea to know your test results and keep a list of the medicines you take. How can you care for yourself at home? · Learn which foods have carbohydrate and how much carbohydrate to eat. A dietitian or diabetes educator can help you learn to keep track of how much carbohydrate you eat. · Spread carbohydrate throughout the day. Eat some carbohydrate at all meals, but do not eat too much at any one time. · Plan meals to include food from all the food groups. These are the food groups and some example portion sizes:  ¨ Grains: 1 slice of bread (1 ounce), ½ cup of cooked cereal, and 1/3 cup of cooked pasta or rice. These have about 15 grams of carbohydrate in a serving. Choose whole grains such as whole wheat bread or crackers, oatmeal, and brown rice more often than refined grains. ¨ Fruit: 1 small fresh fruit, such as an apple or orange; ½ of a banana; ½ cup of chopped, cooked, or canned fruit; ½ cup of fruit juice; 1 cup of melon or raspberries; and 2 tablespoons of dried fruit.  These have about 15 grams of carbohydrate in a serving. ¨ Dairy: 1 cup of nonfat or low-fat milk and 2/3 cup of plain yogurt. These have about 15 grams of carbohydrate in a serving. ¨ Protein foods: Beef, chicken, turkey, fish, eggs, tofu, cheese, cottage cheese, and peanut butter. A serving size of meat is 3 ounces, which is about the size of a deck of cards. Examples of meat substitute serving sizes (equal to 1 ounce of meat) are 1/4 cup of cottage cheese, 1 egg, 1 tablespoon of peanut butter, and ½ cup of tofu. These have very little or no carbohydrate per serving. ¨ Vegetables: Starchy vegetables such as ½ cup of cooked dried beans, peas, potatoes, or corn have about 15 grams of carbohydrate. Nonstarchy vegetables have very little carbohydrate, such as 1 cup of raw leafy vegetables (such as spinach), ½ cup of other vegetables (cooked or chopped), and 3/4 cup of vegetable juice. · Use the plate format to plan meals. It is a good, quick way to make sure that you have a balanced meal. It also helps you spread carbohydrate throughout the day. You divide your plate by types of foods. Put vegetables on half the plate, meat or meat substitutes on one-quarter of the plate, and a grain or starchy vegetable (such as brown rice or a potato) in the final quarter of the plate. To this you can add a small piece of fruit and 1 cup of milk or yogurt, depending on how much carbohydrate you are supposed to eat at a meal.  · Talk to your dietitian or diabetes educator about ways to add limited amounts of sweets into your meal plan. You can eat these foods now and then, as long as you include the amount of carbohydrate they have in your daily carbohydrate allowance. · If you drink alcohol, limit it to no more than 1 drink a day for women and 2 drinks a day for men. If you are pregnant, no amount of alcohol is known to be safe. · Protein, fat, and fiber do not raise blood sugar as much as carbohydrate does.  If you eat a lot of these nutrients in a meal, your blood sugar will rise more slowly than it would otherwise. · Limit saturated fats, such as those from meat and dairy products. Try to replace it with monounsaturated fat, such as olive oil. This is a healthier choice because people who have diabetes are at higher-than-average risk of heart disease. But use a modest amount of olive oil. A tablespoon of olive oil has 14 grams of fat and 120 calories. · Exercise lowers blood sugar. If you take insulin by shots or pump, you can use less than you would if you were not exercising. Keep in mind that timing matters. If you exercise within 1 hour after a meal, your body may need less insulin for that meal than it would if you exercised 3 hours after the meal. Test your blood sugar to find out how exercise affects your need for insulin. · Exercise on most days of the week. Aim for at least 30 minutes. Exercise helps you stay at a healthy weight and helps your body use insulin. Walking is an easy way to get exercise. Gradually increase the amount you walk every day. You also may want to swim, bike, or do other activities. When you eat out  · Learn to estimate the serving sizes of foods that have carbohydrate. If you measure food at home, it will be easier to estimate the amount in a serving of restaurant food. · If the meal you order has too much carbohydrate (such as potatoes, corn, or baked beans), ask to have a low-carbohydrate food instead. Ask for a salad or green vegetables. · If you use insulin, check your blood sugar before and after eating out to help you plan how much to eat in the future. · If you eat more carbohydrate at a meal than you had planned, take a walk or do other exercise. This will help lower your blood sugar. Where can you learn more? Go to [employer]  Enter O287 in the search box to learn more about \"Nutrition Tips for Diabetes: After Your Visit. \"   © 3057-9959 [employer].  Care instructions adapted under license by [employer] (which disclaims liability or warranty for this information). This care instruction is for use with your licensed healthcare professional. If you have questions about a medical condition or this instruction, always ask your healthcare professional. [address] any warranty or liability for your use of this information. Content Version: 95.2.155592; Current as of: June 4, 2014                 Eating Healthy Foods: Care Instructions  Your Care Instructions    Eating healthy foods can help lower your risk for disease. Healthy food gives you energy and keeps your heart strong, your brain active, your muscles working, and your bones strong. A healthy diet includes a variety of foods from the basic food groups: grains, vegetables, fruits, milk and milk products, and meat and beans. Some people may eat more of their favorite foods from only one food group and, as a result, miss getting the nutrients they need. So, it is important to pay attention not only to what you eat but also to what you are missing from your diet. You can eat a healthy, balanced diet by making a few small changes. Follow-up care is a key part of your treatment and safety. Be sure to make and go to all appointments, and call your doctor if you are having problems. It's also a good idea to know your test results and keep a list of the medicines you take. How can you care for yourself at home? Look at what you eat  · Keep a food diary for a week or two and record everything you eat or drink. Track the number of servings you eat from each food group. · For a balanced diet every day, eat a variety of:  ? 6 or more ounce-equivalents of grains, such as cereals, breads, crackers, rice, or pasta, every day.  An ounce-equivalent is 1 slice of bread, 1 cup of ready-to-eat cereal, or ½ cup of cooked rice, cooked pasta, or cooked cereal.  ? 2½ cups of vegetables, especially:  § Dark-green vegetables such as broccoli and spinach. § Orange vegetables such as carrots and sweet potatoes. § Dry beans (such as farfan and kidney beans) and peas (such as lentils). ? 2 cups of fresh, frozen, or canned fruit. A small apple or 1 banana or orange equals 1 cup. ? 3 cups of nonfat or low-fat milk, yogurt, or other milk products. ? 5½ ounces of meat and beans, such as chicken, fish, lean meat, beans, nuts, and seeds. One egg, 1 tablespoon of peanut butter, ½ ounce nuts or seeds, or ¼ cup of cooked beans equals 1 ounce of meat. · Learn how to read food labels for serving sizes and ingredients. Fast-food and convenience-food meals often contain few or no fruits or vegetables. Make sure you eat some fruits and vegetables to make the meal more nutritious. · Look at your food diary. For each food group, add up what you have eaten and then divide the total by the number of days. This will give you an idea of how much you are eating from each food group. See if you can find some ways to change your diet to make it more healthy. Start small  · Do not try to make dramatic changes to your diet all at once. You might feel that you are missing out on your favorite foods and then be more likely to fail. · Start slowly, and gradually change your habits. Try some of the following:  ? Use whole wheat bread instead of white bread. ? Use nonfat or low-fat milk instead of whole milk. ? Eat brown rice instead of white rice, and eat whole wheat pasta instead of white-flour pasta. ? Try low-fat cheeses and low-fat yogurt. ? Add more fruits and vegetables to meals and have them for snacks. ? Add lettuce, tomato, cucumber, and onion to sandwiches. ? Add fruit to yogurt and cereal.  Enjoy food  · You can still eat your favorite foods. You just may need to eat less of them. If your favorite foods are high in fat, salt, and sugar, limit how often you eat them, but do not cut them out entirely. · Eat a wide variety of foods.   Make healthy choices when eating out  · The type of restaurant you choose can help you make healthy choices. Even fast-food chains are now offering more low-fat or healthier choices on the menu. · Choose smaller portions, or take half of your meal home. · When eating out, try:  ? A veggie pizza with a whole wheat crust or grilled chicken (instead of sausage or pepperoni). ? Pasta with roasted vegetables, grilled chicken, or marinara sauce instead of cream sauce. ? A vegetable wrap or grilled chicken wrap. ? Broiled or poached food instead of fried or breaded items. Make healthy choices easy  · Buy packaged, prewashed, ready-to-eat fresh vegetables and fruits, such as baby carrots, salad mixes, and chopped or shredded broccoli and cauliflower. · Buy packaged, presliced fruits, such as melon or pineapple. · Choose 100% fruit or vegetable juice instead of soda. Limit juice intake to 4 to 6 oz (½ to ¾ cup) a day. · Blend low-fat yogurt, fruit juice, and canned or frozen fruit to make a smoothie for breakfast or a snack. Where can you learn more? Go to http://yanci-ava.info/. Enter T756 in the search box to learn more about \"Eating Healthy Foods: Care Instructions. \"  Current as of: March 29, 2018  Content Version: 11.8  © 5006-8357 [employer] Care instructions adapted under license by [employer] (which disclaims liability or warranty for this information). If you have questions about a medical condition or this instruction, always ask your healthcare professional. [place] [address] any warranty or liability for your use of this information. Learning About Dietary Guidelines  What are the Dietary Guidelines for Americans? Dietary Guidelines for Americans provide tips for eating well and staying healthy. This helps reduce the risk for long-term (chronic) diseases.   These adult guidelines from the [place] [address] recommend that you:  · Eat lots of fruits, vegetables, whole grains, and low-fat or nonfat dairy products. · Try to balance your eating with your activity. This helps you stay at a healthy weight. · Drink alcohol in moderation, if at all. · Limit foods high in salt, saturated fat, trans fat, and added sugar. What is MyPlate? MyPlate is the [place] government's food guide. It can help you make your own well-balanced eating plan. A balanced eating plan means that you eat enough, but not too much, and that your food gives you the nutrients you need to stay healthy. MyPlate focuses on eating plenty of whole grains, fruits, and vegetables, and on limiting fat and sugar. It is available online at www. ChooseMyPlate.gov. How can you get started? MyPlate suggests that most adults eat certain amounts from the different food groups:  Grains  Eat 5 to 8 ounces of grains each day. Half of those should be whole grains. Choose whole-grain breads, cold and cooked cereals and grains, pasta (without creamy sauces), hard rolls, or low-fat or fat-free crackers. Vegetables  Eat 2 to 3 cups of vegetables every day. They contain little if any fat. And they have lots of nutrients that help protect against heart disease. Fruits  Eat 1½ to 2 cups of fruits every day. Fruits contain very little fat but lots of nutrients. Protein foods  Most adults need 5 to 6½ ounces each day. Choose fish and lean poultry more often. Eat red meat and fried meats less often. Dried beans, tofu, and nuts are also good sources of protein. Dairy  Most adults need 3 cups of milk and milk products a day. Choose low-fat or fat-free products from this food group. If you have problems digesting milk, try eating cheese or yogurt instead. Limit fats and oils, including those used in cooking. When you do use fats, choose oils that are liquid at room temperature (unsaturated fats). These include canola oil and olive oil.  Avoid foods with trans fats, such as many fried foods, cookies, and snack foods.  Where can you learn more? Go to http://yanci-ava.info/. Enter R647 in the search box to learn more about \"Learning About Dietary Guidelines. \"  Current as of: March 29, 2018  Content Version: 11.8  © 0916-9593 Healthwise, [employer]. Care instructions adapted under license by [employer] (which disclaims liability or warranty for this information). If you have questions about a medical condition or this instruction, always ask your healthcare professional. [address] any warranty or liability for your use of this information. Heart-Healthy Diet: Care Instructions  Your Care Instructions    A heart-healthy diet has lots of vegetables, fruits, nuts, beans, and whole grains, and is low in salt. It limits foods that are high in saturated fat, such as meats, cheeses, and fried foods. It may be hard to change your diet, but even small changes can lower your risk of heart attack and heart disease. Follow-up care is a key part of your treatment and safety. Be sure to make and go to all appointments, and call your doctor if you are having problems. It's also a good idea to know your test results and keep a list of the medicines you take. How can you care for yourself at home? Watch your portions  · Learn what a serving is. A \"serving\" and a \"portion\" are not always the same thing. Make sure that you are not eating larger portions than are recommended. For example, a serving of pasta is ½ cup. A serving size of meat is 2 to 3 ounces. A 3-ounce serving is about the size of a deck of cards. Measure serving sizes until you are good at [place]" them. Keep in mind that restaurants often serve portions that are 2 or 3 times the size of one serving. · To keep your energy level up and keep you from feeling hungry, eat often but in smaller portions. · Eat only the number of calories you need to stay at a healthy weight.  If you need to lose weight, eat fewer calories than your body burns (through exercise and other physical activity). Eat more fruits and vegetables  · Eat a variety of fruit and vegetables every day. Dark green, deep orange, red, or yellow fruits and vegetables are especially good for you. Examples include spinach, carrots, peaches, and berries. · Keep carrots, celery, and other veggies handy for snacks. Buy fruit that is in season and store it where you can see it so that you will be tempted to eat it. · Cook dishes that have a lot of veggies in them, such as stir-fries and soups. Limit saturated and trans fat  · Read food labels, and try to avoid saturated and trans fats. They increase your risk of heart disease. Trans fat is found in many processed foods such as cookies and crackers. · Use olive or canola oil when you cook. Try cholesterol-lowering spreads, such as Benecol or Take Control. · Bake, broil, grill, or steam foods instead of frying them. · Choose lean meats instead of high-fat meats such as hot dogs and sausages. Cut off all visible fat when you prepare meat. · Eat fish, skinless poultry, and meat alternatives such as soy products instead of high-fat meats. Soy products, such as tofu, may be especially good for your heart. · Choose low-fat or fat-free milk and dairy products. Eat fish  · Eat at least two servings of fish a week. Certain fish, such as salmon and tuna, contain omega-3 fatty acids, which may help reduce your risk of heart attack. Eat foods high in fiber  · Eat a variety of grain products every day. Include whole-grain foods that have lots of fiber and nutrients. Examples of whole-grain foods include oats, whole wheat bread, and brown rice. · Buy whole-grain breads and cereals, instead of white bread or pastries. Limit salt and sodium  · Limit how much salt and sodium you eat to help lower your blood pressure. · Taste food before you salt it. Add only a little salt when you think you need it.  With time, your taste buds will adjust to less salt. · Eat fewer snack items, fast foods, and other high-salt, processed foods. Check food labels for the amount of sodium in packaged foods. · Choose low-sodium versions of canned goods (such as soups, vegetables, and beans). Limit sugar  · Limit drinks and foods with added sugar. These include candy, desserts, and soda pop. Limit alcohol  · Limit alcohol to no more than 2 drinks a day for men and 1 drink a day for women. Too much alcohol can cause health problems. When should you call for help? Watch closely for changes in your health, and be sure to contact your doctor if:    · You would like help planning heart-healthy meals. Where can you learn more? Go to http://yanci-ava.info/. Enter V137 in the search box to learn more about \"Heart-Healthy Diet: Care Instructions. \"  Current as of: December 6, 2017  Content Version: 11.8  © 5520-9918 Healthwise, [employer]. Care instructions adapted under license by [employer] (which disclaims liability or warranty for this information). If you have questions about a medical condition or this instruction, always ask your healthcare professional. [address] any warranty or liability for your use of this information.

## 2018-11-15 NOTE — PROGRESS NOTES
ROOM # 1    Alexander Mcdonald presents today for   Chief Complaint   Patient presents with    Hypertension    Diabetes    Cholesterol Problem       Alexander Mcdonald preferred language for health care discussion is english/other. Is someone accompanying this pt? no    Is the patient using any DME equipment during OV? no    Depression Screening:  PHQ over the last two weeks 11/2/2018 5/22/2018 2/22/2018 10/24/2017 7/24/2017 4/24/2017 9/22/2016   Little interest or pleasure in doing things Not at all Not at all Not at all Not at all Not at all Not at all Not at all   Feeling down, depressed, irritable, or hopeless Not at all Not at all Not at all Not at all Not at all Not at all Not at all   Total Score PHQ 2 0 0 0 0 0 0 0       Learning Assessment:  Learning Assessment 2/14/2018 1/14/2015 12/20/2013   PRIMARY LEARNER Patient Patient Patient   HIGHEST LEVEL OF EDUCATION - PRIMARY LEARNER  - DID NOT GRADUATE HIGH SCHOOL DID NOT GRADUATE [address]    NEED - - No   LEARNER [address] [facility] - - no   ANSWERED BY self patient patient   RELATIONSHIP SELF SELF SELF       Abuse Screening:  Abuse Screening Questionnaire 10/24/2017   Do you ever feel afraid of your partner? N   Are you in a relationship with someone who physically or mentally threatens you? N   Is it safe for you to go home? Y       Fall Risk  No flowsheet data found. Health Maintenance reviewed and discussed per provider. Yes    Alexander Mcdonald is due for   Health Maintenance Due   Topic Date Due    Shingrix Vaccine Age 50> (1 of 2) 12/17/2009    EYE EXAM RETINAL OR DILATED Q1  09/09/2017    FOOT EXAM Q1  01/25/2018    Influenza Age 5 to Adult  08/01/2018    COLONOSCOPY  12/02/2018         Please order/place referral if appropriate. Advance Directive:  1.  Do you have an advance directive in place? Patient Reply: no    2. If not, would you like material regarding how to put one in place? Patient Reply: no    Coordination of Care:  1. Have you been to the ER, urgent care clinic since your last visit? Hospitalized since your last visit? no    2. Have you seen or consulted any other health care providers outside of the [address] [place] [address] since your last visit? Include any pap smears or colon screening. no      Immunization/s administered 11/15/2018 by Hubert Ling LPN with guardian's consent. Patient tolerated procedure well. No reactions noted.

## 2018-11-15 NOTE — PROGRESS NOTES
Chief Complaint   Patient presents with    Hypertension    Diabetes    Cholesterol Problem       HPI:     Liam Sanabria is a 61 y.o. -Amercian male with history of depression, type 2 DM, dyslipidemia and hypertension  here for the above complaint. He got on line lipozene and another diet pill that has caffeine, green tea. Told him to return those medications and he should not use them. He denies any chest pain, shortness of breath, abdominal pain, headaches or dizziness. He is sometimes tired. He cannot get knee surgery until he loses 30 pounds. Type 2 DM: average 110-115. Sugar range: 100-120.             Past Medical History:   Diagnosis Date    Advance directive discussed with patient 04/24/2017    Anxiety     Back injury 4/27/2007    Behavioral change     Chronic pain     CKD (chronic kidney disease) stage 2, GFR 60-89 ml/min 12/20/2013    Confusion     DDD (degenerative disc disease)     Depression     [facility]     Diabetic eye exam ([facility] [facility] 09/09/2016    Dr. Roshni Lazcano ( bilateral cataracts)    Difficulty walking     DJD (degenerative joint disease)     Fatigue     Generalized stiffness     H/O colonoscopy 12/2/2013    Bx: showed 2 tubular adenomas Done by Dr. Andrew Bowen    High cholesterol     Hypertension     Incoordination     Infected sebaceous cyst 5/4/2016    Insomnia     Joint pain     Lower extremity edema     Lymphedema     Muscle pain     Neuropathy     Osteoarthritis     Other and unspecified hyperlipidemia     Painful sexual intercourse     Poor concentration     Snoring     Type II or unspecified type diabetes mellitus without mention of complication, not stated as uncontrolled     Weakness      Past Surgical History:   Procedure Laterality Date    HX COLONOSCOPY      HX ORTHOPAEDIC      left elbow surgery    HX ORTHOPAEDIC Left 1/19/16    left Total Knee replacement    HX OTHER SURGICAL  02/2018    removal of abscess in back    NE DRAIN SKIN ABSCESS COMPLIC N/A 75/45/1692    Dr. Luciana Ken     Current Outpatient Medications   Medication Sig    diclofenac (VOLTAREN) 1 % gel Apply  to affected area four (4) times daily.  acetaminophen (TYLENOL EXTRA STRENGTH) 500 mg tablet Take  by mouth every six (6) hours as needed for Pain.  oxyCODONE-acetaminophen (PERCOCET) 7.5-325 mg per tablet Take one po daily prn pain    naloxone (NARCAN) 4 mg/actuation nasal spray Use 1 spray intranasally into 1 nostril. Use a new Narcan nasal spray for subsequent doses and administer into alternating nostrils.  atorvastatin (LIPITOR) 10 mg tablet TAKE 1 TABLET BY MOUTH DAILY    metoprolol tartrate (LOPRESSOR) 50 mg tablet TAKE 1 TABLET BY MOUTH TWICE A DAY    gabapentin (NEURONTIN) 300 mg capsule TAKE 2 CAPSULES BY MOUTH EVERY MORNING AND 2 CAPSULES EVERY EVENING    FREESTYLE LITE STRIPS strip Check fasting sugars daily before breakfast. BRAND MEDICALLY NECESSARY. FOR FREESTYLE LITE METER    Lancets misc Check fasting sugars daily before breakfast. BRAND MEDICALLY NECESSARY. FOR FREESTYLE LITE METER    furosemide (LASIX) 20 mg tablet Take one po daily prn swelling    FREESTYLE LITE METER monitoring kit Check fasting sugars daily before breakfast. BRAND MEDICALLY NECESSARY    Aspirin, Buffered 81 mg tab Take 81 mg by mouth daily.  amLODIPine-benazepril (LOTREL) 5-20 mg per capsule TAKE ONE CAPSULE BY MOUTH ONCE DAILY     No current facility-administered medications for this visit.       Health Maintenance   Topic Date Due    Shingrix Vaccine Age 49> (1 of 2) 12/17/2009    EYE EXAM RETINAL OR DILATED Q1  09/09/2017    FOOT EXAM Q1  01/25/2018    Influenza Age 5 to Adult  08/01/2018    COLONOSCOPY  12/02/2018    HEMOGLOBIN A1C Q6M  02/22/2019    MICROALBUMIN Q1  02/22/2019    LIPID PANEL Q1  08/22/2019    MEDICARE YEARLY EXAM  08/23/2019    DTaP/Tdap/Td series (2 - Td) 04/24/2027    Hepatitis C Screening  Completed    Pneumococcal 19-64 Medium Risk  Completed     Immunization History   Administered Date(s) Administered    Influenza Vaccine 02/06/2014, 09/09/2014    Influenza Vaccine (Quad) PF 11/23/2015, 01/23/2017, 10/24/2017    Pneumococcal Polysaccharide (PPSV-23) 10/24/2017     No LMP for male patient. Allergies and Intolerances:   No Known Allergies    Family History:   Family History   Problem Relation Age of Onset   Silverman.James Stroke Mother     Hypertension Mother     Seizures Mother     Diabetes Father     Heart Disease Father     Hypertension Father     Other Maternal Grandmother         tumor in abdomen       Social History:   He  reports that  has never smoked. he has never used smokeless tobacco.  He  reports that he does not drink alcohol. ·     Objective:   Physical exam:   Visit Vitals  BP (!) 142/98 (BP 1 Location: Left arm, BP Patient Position: Sitting) Comment: pt was rushing to get here. Pulse (!) 59   Temp 97.8 °F (36.6 °C) (Oral)   Resp 14   Ht 5' 7\" (1.702 m)   Wt 234 lb (106.1 kg)   SpO2 97%   BMI 36.65 kg/m²        Generally: Pleasant male in no acute distress  Cardiac Exam: regular, rate, and rhythm. Normal S1 and S2. No murmurs, gallops, or rubs  Pulmonary exam: Clear to auscultation bilaterally  Abdominal exam: Positive bowel sounds in all four quadrants, soft, nondistended, nontender  Extremities: 2+ dorsalis pedis pulses bilaterally.  No pedal edema    bilaterally    LABS/Radiological Tests:  Lab Results   Component Value Date/Time    WBC 5.8 03/12/2018 12:06 PM    HGB 15.6 03/12/2018 12:06 PM    HCT 46.4 03/12/2018 12:06 PM    PLATELET 768 34/97/2978 12:06 PM     Lab Results   Component Value Date/Time    Sodium 141 08/22/2018 10:05 AM    Potassium 3.9 08/22/2018 10:05 AM    Chloride 104 08/22/2018 10:05 AM    CO2 30 08/22/2018 10:05 AM    Glucose 125 (H) 08/22/2018 10:05 AM    BUN 12 08/22/2018 10:05 AM    Creatinine 1.12 08/22/2018 10:05 AM     Lab Results   Component Value Date/Time Cholesterol, total 146 08/22/2018 10:05 AM    HDL Cholesterol 42 08/22/2018 10:05 AM    LDL, calculated 85.2 08/22/2018 10:05 AM    Triglyceride 94 08/22/2018 10:05 AM     No results found for: GPT      Component      Latest Ref Rng & Units 5/22/2018          10:13 AM   Hemoglobin A1c, (calculated)      4.2 - 5.6 % 6.9 (H)   Est. average glucose      mg/dL 151   All lab results and radiological studies were discussed and reviewed with the patient. ASSESSMENT/PLAN:    1. Diabetes mellitus, stable (HCC):we will see what the labs show. Continue diet and exercise.   -     HEMOGLOBIN A1C WITH EAG; Future    2. Dyslipidemia: we will see what the lab show. Continue diet, exercise and lipitor.   -     METABOLIC PANEL, COMPREHENSIVE; Future  -     LIPID PANEL; Future    3. Benign hypertension without CHF: not well controlled because he was rushing to get here. Continue the lopressor and lotrel, diet and exercise. He will try to do water aerobics to help with his weight. This will not put so much pressure on his joints. 4. Encounter for immunization  -     INFLUENZA VIRUS VAC QUAD,SPLIT,PRESV FREE SYRINGE IM  -     ADMIN INFLUENZA VIRUS VAC    5. Encounter for chronic pain management: due for UDS. Pain agreement up to date on 7/25/18.  -     [place] Body 13 (MW); Future      6. Patient verbalized understanding and agreement with the plan. 7. Patient was given an after-visit summary. 8. Follow-up Disposition:  Return in about 3 months (around 2/15/2019) for f/u DM/HTN/lipids. or sooner if worsening symptoms.                 Austin Main MD

## 2018-11-19 ENCOUNTER — TELEPHONE (OUTPATIENT)
Dept: INTERNAL MEDICINE CLINIC | Age: 58
End: 2018-11-19

## 2018-11-19 DIAGNOSIS — E11.65 POORLY CONTROLLED DIABETES MELLITUS (HCC): Primary | ICD-10-CM

## 2018-11-19 RX ORDER — METFORMIN HYDROCHLORIDE 500 MG/1
250 TABLET ORAL 2 TIMES DAILY WITH MEALS
Qty: 30 TAB | Refills: 3 | Status: SHIPPED | OUTPATIENT
Start: 2018-11-19 | End: 2019-03-30 | Stop reason: SDUPTHER

## 2018-11-19 NOTE — PROGRESS NOTES
Please let pt know that labs were normal except: 
 
1) glucose up at 123 and HgA1C is now 7.3. Will start metformin 500mg 1/2 po bid. #60 with 3 refills. He need to monitor his sugars and update us in 1 week with readings. Work on diet and exercise.

## 2018-11-19 NOTE — TELEPHONE ENCOUNTER
----- Message from Kaitlin Garrett MD sent at 11/19/2018  9:01 AM EST -----  Please let pt know that labs were normal except:    1) glucose up at 123 and HgA1C is now 7.3. Will start metformin 500mg 1/2 po bid. #60 with 3 refills. He need to monitor his sugars and update us in 1 week with readings. Work on diet and exercise.

## 2018-11-19 NOTE — TELEPHONE ENCOUNTER
Called patient. 2 patient identifiers confirmed. Patient verbalizes understanding of result note.  Patient states he will  mediciations at his pharmacy

## 2018-11-22 LAB — DRUGS UR: NORMAL

## 2018-11-26 DIAGNOSIS — Z76.0 MEDICATION REFILL: ICD-10-CM

## 2018-11-26 DIAGNOSIS — G89.29 OTHER CHRONIC PAIN: ICD-10-CM

## 2018-11-26 NOTE — TELEPHONE ENCOUNTER
Patient request, last OV 11/15/18, next scheduled 2/14/19.     Requested Prescriptions     Pending Prescriptions Disp Refills    oxyCODONE-acetaminophen (PERCOCET) 7.5-325 mg per tablet 30 Tab 0     Sig: Take one po daily prn pain

## 2018-11-28 RX ORDER — OXYCODONE AND ACETAMINOPHEN 7.5; 325 MG/1; MG/1
TABLET ORAL
Qty: 30 TAB | Refills: 0 | Status: SHIPPED | OUTPATIENT
Start: 2018-11-28 | End: 2018-12-26 | Stop reason: SDUPTHER

## 2018-11-28 NOTE — TELEPHONE ENCOUNTER
PCP: Shamika Acevedo MD    Last appt: 11/15/2018  Future Appointments   Date Time Provider Shirin Muñoz   2/14/2019  9:30 AM Kristofer Rodríguez MD [facility] SCHED       Requested Prescriptions     Pending Prescriptions Disp Refills    oxyCODONE-acetaminophen (PERCOCET) 7.5-325 mg per tablet 30 Tab 0     Sig: Take one po daily prn pain       Request for a 30 or 90 day supply? Provider Discretion    Pharmacy: [facility]    Other Comments:   printed and placed in PCP medication refill review folder.      Last UDS date: 11/15/2018  Pain contract signed: 07/25/2018

## 2018-11-28 NOTE — TELEPHONE ENCOUNTER
Printed rx for:    Requested Prescriptions     Signed Prescriptions Disp Refills    oxyCODONE-acetaminophen (PERCOCET) 7.5-325 mg per tablet 30 Tab 0     Sig: Take one po daily prn pain     Authorizing Provider: Wes Kwon     Please let pt know that this is ready for . Checked  and no aberrancies seen.

## 2018-12-26 DIAGNOSIS — G89.29 OTHER CHRONIC PAIN: ICD-10-CM

## 2018-12-26 DIAGNOSIS — Z76.0 MEDICATION REFILL: ICD-10-CM

## 2018-12-26 NOTE — TELEPHONE ENCOUNTER
printed and placed in PCP medication refill review folder    Last UDS date: 11/15/2018  Pain contract signed: 07/25/2018  Last OV: 11/15/2018  Next Appt: 02/14/2019

## 2018-12-28 RX ORDER — OXYCODONE AND ACETAMINOPHEN 7.5; 325 MG/1; MG/1
TABLET ORAL
Qty: 30 TAB | Refills: 0 | Status: SHIPPED | OUTPATIENT
Start: 2018-12-28 | End: 2019-01-26 | Stop reason: SDUPTHER

## 2019-01-21 ENCOUNTER — ANESTHESIA EVENT (OUTPATIENT)
Dept: ENDOSCOPY | Age: 59
End: 2019-01-21
Payer: MEDICARE

## 2019-01-21 NOTE — PERIOP NOTES
PAT - SURGICAL PRE-ADMISSION INSTRUCTIONS 
 
NAME:  Venetta Hashimoto                                                          TODAY'S DATE:  1/21/2019 SURGERY DATE:  1/22/2019                                  SURGERY ARRIVAL TIME:   0600 1. Do NOT eat or drink anything, including candy or gum, after MIDNIGHT on 01/21/19 , unless you have specific instructions from your Surgeon or Anesthesia Provider to do so. 2. No smoking on the day of surgery. 3. No alcohol 24 hours prior to the day of surgery. 4. No recreational drugs for one week prior to the day of surgery. 5. Leave all valuables, including money/purse, at home. 6. Remove all jewelry, nail polish, makeup (including mascara); no lotions, powders, deodorant, or perfume/cologne/after shave. 7. Glasses/Contact lenses and Dentures may be worn to the hospital.  They will be removed prior to surgery. 8. Call your doctor if symptoms of a cold or illness develop within 24 ours prior to surgery. 9. AN ADULT MUST DRIVE YOU HOME AFTER OUTPATIENT SURGERY. 10. If you are having an OUTPATIENT procedure, please make arrangements for a responsible adult to be with you for 24 hours after your surgery. 11. If you are admitted to the hospital, you will be assigned to a bed after surgery is complete. Normally a family member will not be able to see you until you are in your assigned bed. 15. Family is encouraged to accompany you to the hospital.  We ask visitors in the treatment area to be limited to ONE person at a time to ensure patient privacy. EXCEPTIONS WILL BE MADE AS NEEDED. 15. Children under 12 are discouraged from entering the treatment area and need to be supervised by an adult when in the waiting room. Special Instructions: Take these medications the morning of surgery with a sip of water:  Metoprolol, HOLD oral diabetic medication on the MORNING OF surgery. , HOLD metformin/glucophage dose starting the EVENING BEFORE the day of surgery. , Complete bowel prep per MD instructions. Patient Prep: 
 
shower with anti-bacterial soap These surgical instructions were reviewed with Neri Dallas during the PAT phone call. Directions: On the morning of surgery, please go to the [facility]. Enter the building from the [facility] entrance, 1st floor (next to the Emergency Room entrance). Take the elevator to the 2nd floor. Sign in at the Registration Desk. If you have any questions and/or concerns, please do not hesitate to call: 
(Prior to the day of surgery)  hospitals unit:  [phone] (Day of surgery)   unit:  [phone]

## 2019-01-22 ENCOUNTER — HOSPITAL ENCOUNTER (OUTPATIENT)
Age: 59
Setting detail: OUTPATIENT SURGERY
Discharge: HOME | End: 2019-01-22
Attending: INTERNAL MEDICINE | Admitting: INTERNAL MEDICINE
Payer: MEDICARE

## 2019-01-22 ENCOUNTER — ANESTHESIA (OUTPATIENT)
Dept: ENDOSCOPY | Age: 59
End: 2019-01-22
Payer: MEDICARE

## 2019-01-22 VITALS
TEMPERATURE: 98 F | RESPIRATION RATE: 18 BRPM | DIASTOLIC BLOOD PRESSURE: 90 MMHG | OXYGEN SATURATION: 99 % | BODY MASS INDEX: 36.96 KG/M2 | HEART RATE: 66 BPM | HEIGHT: 66 IN | SYSTOLIC BLOOD PRESSURE: 133 MMHG | WEIGHT: 230 LBS

## 2019-01-22 LAB — GLUCOSE BLD STRIP.AUTO-MCNC: 91 MG/DL (ref 70–110)

## 2019-01-22 PROCEDURE — 76040000007: Performed by: INTERNAL MEDICINE

## 2019-01-22 PROCEDURE — 82962 GLUCOSE BLOOD TEST: CPT

## 2019-01-22 PROCEDURE — 76060000032 HC ANESTHESIA 0.5 TO 1 HR: Performed by: INTERNAL MEDICINE

## 2019-01-22 PROCEDURE — 88305 TISSUE EXAM BY PATHOLOGIST: CPT

## 2019-01-22 PROCEDURE — 77030031670 HC DEV INFL ENDOTEK BIG60 MRTM -B: Performed by: INTERNAL MEDICINE

## 2019-01-22 PROCEDURE — 77030009426 HC FCPS BIOP ENDOSC BSC -B: Performed by: INTERNAL MEDICINE

## 2019-01-22 PROCEDURE — 74011250636 HC RX REV CODE- 250/636

## 2019-01-22 PROCEDURE — 74011250636 HC RX REV CODE- 250/636: Performed by: NURSE ANESTHETIST, CERTIFIED REGISTERED

## 2019-01-22 RX ORDER — PROPOFOL 10 MG/ML
INJECTION, EMULSION INTRAVENOUS AS NEEDED
Status: DISCONTINUED | OUTPATIENT
Start: 2019-01-22 | End: 2019-01-22 | Stop reason: HOSPADM

## 2019-01-22 RX ORDER — INSULIN LISPRO 100 [IU]/ML
INJECTION, SOLUTION INTRAVENOUS; SUBCUTANEOUS ONCE
Status: DISCONTINUED | OUTPATIENT
Start: 2019-01-22 | End: 2019-01-22 | Stop reason: HOSPADM

## 2019-01-22 RX ORDER — SODIUM CHLORIDE, SODIUM LACTATE, POTASSIUM CHLORIDE, CALCIUM CHLORIDE 600; 310; 30; 20 MG/100ML; MG/100ML; MG/100ML; MG/100ML
75 INJECTION, SOLUTION INTRAVENOUS CONTINUOUS
Status: DISCONTINUED | OUTPATIENT
Start: 2019-01-22 | End: 2019-01-22 | Stop reason: HOSPADM

## 2019-01-22 RX ORDER — MAGNESIUM SULFATE 100 %
4 CRYSTALS MISCELLANEOUS AS NEEDED
Status: DISCONTINUED | OUTPATIENT
Start: 2019-01-22 | End: 2019-01-22 | Stop reason: HOSPADM

## 2019-01-22 RX ORDER — DEXTROSE MONOHYDRATE 25 G/50ML
25-50 INJECTION, SOLUTION INTRAVENOUS AS NEEDED
Status: DISCONTINUED | OUTPATIENT
Start: 2019-01-22 | End: 2019-01-22 | Stop reason: HOSPADM

## 2019-01-22 RX ORDER — LIDOCAINE HYDROCHLORIDE 10 MG/ML
0.1 INJECTION, SOLUTION EPIDURAL; INFILTRATION; INTRACAUDAL; PERINEURAL AS NEEDED
Status: DISCONTINUED | OUTPATIENT
Start: 2019-01-22 | End: 2019-01-22 | Stop reason: HOSPADM

## 2019-01-22 RX ADMIN — PROPOFOL 10 MG: 10 INJECTION, EMULSION INTRAVENOUS at 08:20

## 2019-01-22 RX ADMIN — PROPOFOL 10 MG: 10 INJECTION, EMULSION INTRAVENOUS at 08:27

## 2019-01-22 RX ADMIN — PROPOFOL 10 MG: 10 INJECTION, EMULSION INTRAVENOUS at 08:28

## 2019-01-22 RX ADMIN — PROPOFOL 10 MG: 10 INJECTION, EMULSION INTRAVENOUS at 08:22

## 2019-01-22 RX ADMIN — PROPOFOL 20 MG: 10 INJECTION, EMULSION INTRAVENOUS at 08:14

## 2019-01-22 RX ADMIN — PROPOFOL 10 MG: 10 INJECTION, EMULSION INTRAVENOUS at 08:18

## 2019-01-22 RX ADMIN — PROPOFOL 10 MG: 10 INJECTION, EMULSION INTRAVENOUS at 08:24

## 2019-01-22 RX ADMIN — PROPOFOL 10 MG: 10 INJECTION, EMULSION INTRAVENOUS at 08:19

## 2019-01-22 RX ADMIN — PROPOFOL 10 MG: 10 INJECTION, EMULSION INTRAVENOUS at 08:21

## 2019-01-22 RX ADMIN — PROPOFOL 10 MG: 10 INJECTION, EMULSION INTRAVENOUS at 08:26

## 2019-01-22 RX ADMIN — PROPOFOL 20 MG: 10 INJECTION, EMULSION INTRAVENOUS at 08:16

## 2019-01-22 RX ADMIN — SODIUM CHLORIDE, SODIUM LACTATE, POTASSIUM CHLORIDE, AND CALCIUM CHLORIDE: 600; 310; 30; 20 INJECTION, SOLUTION INTRAVENOUS at 08:06

## 2019-01-22 RX ADMIN — PROPOFOL 20 MG: 10 INJECTION, EMULSION INTRAVENOUS at 08:17

## 2019-01-22 RX ADMIN — PROPOFOL 10 MG: 10 INJECTION, EMULSION INTRAVENOUS at 08:23

## 2019-01-22 RX ADMIN — PROPOFOL 10 MG: 10 INJECTION, EMULSION INTRAVENOUS at 08:25

## 2019-01-22 RX ADMIN — PROPOFOL 60 MG: 10 INJECTION, EMULSION INTRAVENOUS at 08:10

## 2019-01-22 RX ADMIN — PROPOFOL 20 MG: 10 INJECTION, EMULSION INTRAVENOUS at 08:12

## 2019-01-22 NOTE — ROUTINE PROCESS
Patient taken to recovery by this RN and CRNA, bedside report given to [facility]   Patient stable and on monitor. SBAR completed.

## 2019-01-22 NOTE — PROCEDURES
Colonoscopy Report    Patient: Diego Arrieta MRN: [id number]  SSN: [id number]    YOB: 1960  Age: 60 y.o. Sex: male      Date of Procedure: 1/22/2019    IMPRESSION:  1. Diminutive, 2 mm, polyp in the ascending colon, biopsied. 2. Three diminutive, 3 mm, rectal polyps, cold-snared. 3. Internal hemorrhoids. RECOMMENDATIONS:  1. Await pathology. Indication:  Personal history of colon polyps (screening only)  Procedure Performed: Colonoscopy polypectomy (cold snare), polypectomy (cold biopsy)  Endoscopist: Aleena Schaeffer MD  Assistant: Jayjay morris, Yang Cristobal RN. ASA: Per Anesthesia  Mallampati Score: II (soft palate, uvula, fauces visible)  Anesthesia: MAC anesthesia  Endoscope: [id number]  Extent of Examination:terminal ileum  Quality of Preparation:     []  Excellent   [x]  Very Good   [] Fair but adequate   [] Fair, inadequate   []  Poor      Technique: The procedure was discussed with the patient including risks, benefits, alternatives including risks of IV sedation, bleeding, perforation and missed polyp. A safety timeout was performed. The patient was given incremental doses of Versed and Demerol to achieve moderate conscious sedation. The patients vital signs were monitored at all times including heart rate, rhythm, blood pressure and oxygen saturation. The patient was placed in left lateral position. When adequate sedation was achieved a perianal inspection and a digital rectal exam were performed. Video colonoscope was introduced into the rectum and advanced under direct vision up to the terminal ileum. The cecum was identified by IC valve, appendiceal orifice and crows foot. With adequate insufflation and maneuvering of the withdrawing scope, the colonic mucosa was visualized carefully. Retroflexion was performed in the rectum and the distal rectum visualized. The patient tolerated the procedure very well and was transferred to recovery area. Findings: No ulcers, erosions, inflammatory changes, vascular anomalies, or large mass lesions were seen. A 2 mm sessile polyp was seen in the ascending colon and was biopsied off. Three, 3 mm, sessile polyps were seen in the rectum and were cold-snared. No significant diverticula were seen. Internal hemorrhoids were noted.    EBL: minimal  Specimen:   ID Type Source Tests Collected by Time Destination   1 : bx polyp Preservative Colon, Ascending  Princess García MD 1/22/2019 [id number] Pathology   2 : Cold snare polyps x3  Preservative Rectum  Princess García MD 1/22/2019 [id number] Pathology       True Maxwell MD  January 22, 2019  8:40 AM

## 2019-01-22 NOTE — H&P
History and Physical 
 
Edie Stagers [facility] 
1960 
[id number] 
[id number] Pre-Procedure Diagnosis:  hx colon polyps  z86.010 Evaluation of past illnesses, surgeries, or injuries:   YES Past Medical History:  
Diagnosis Date  Advance directive discussed with patient 04/24/2017  Back injury 4/27/2007  Behavioral change  Chronic pain  CKD (chronic kidney disease) stage 2, GFR 60-89 ml/min 12/20/2013  DDD (degenerative disc disease)  Depression  Diabetes mellitus, stable ([facility] [address]  Diabetic eye exam ([facility] [address] 09/09/2016 Dr. Balwinder Sims ( bilateral cataracts)  Difficulty walking  DJD (degenerative joint disease)  Generalized stiffness  High cholesterol  Hypertension  Insomnia  Lower extremity edema  Lymphedema  Osteoarthritis  Other and unspecified hyperlipidemia  Type II or unspecified type diabetes mellitus without mention of complication, not stated as uncontrolled Past Surgical History:  
Procedure Laterality Date  HX COLONOSCOPY    
 HX ORTHOPAEDIC    
 left elbow surgery  HX ORTHOPAEDIC Left 1/19/16  
 left Total Knee replacement  HX OTHER SURGICAL  02/2018  
 removal of abscess in back [address] N/A 16/35/7051 Dr. Reuben Hunt Allergies:  No Known Allergies Previous reactions to sedation/analgesia? NO Review of current medications, supplement, herbals and nutraceuticals complete:  YES Current Facility-Administered Medications Medication Dose Route Frequency Provider Last Rate Last Dose  lidocaine (PF) (XYLOCAINE) 10 mg/mL (1 %) injection 0.1 mL  0.1 mL SubCUTAneous PRN Valerie Johnston CRNA  lactated Ringers infusion  75 mL/hr IntraVENous CONTINUOUS Valerie Johnston CRNA  insulin lispro (HUMALOG) injection   SubCUTAneous ONCE Valerie Hernandez CRNA  glucose chewable tablet 16 g  4 Tab Oral PRN Valerie Johnston CRNA  glucagon (GLUCAGEN) injection 1 mg  1 mg IntraMUSCular PRN Valerie Mcdonald CRNA  dextrose (D50) infusion 12.5-25 g  25-50 mL IntraVENous PRN Valerie Mcdonald CRNA Pertinent labs reviewed? YES History of substance abuse? NO Family History Problem Relation Age of Onset  Stroke Mother  Hypertension Mother  Seizures Mother  Diabetes Father  Heart Disease Father  Hypertension Father  Other Maternal Grandmother   
     tumor in abdomen Social History Socioeconomic History  Marital status:  Spouse name: Not on file  Number of children: Not on file  Years of education: Not on file  Highest education level: Not on file Social Needs  Financial resource strain: Not on file  Food insecurity - worry: Not on file  Food insecurity - inability: Not on file  Transportation needs - medical: Not on file  Transportation needs - non-medical: Not on file Occupational History  Not on file Tobacco Use  Smoking status: Never Smoker  Smokeless tobacco: Never Used Substance and Sexual Activity  Alcohol use: No  
  Alcohol/week: 0.0 oz  Drug use: No  
 Sexual activity: Yes  
  Partners: Female Birth control/protection: Condom Other Topics Concern  Not on file Social History Narrative  Not on file Cardiac Status:  WNL Mental Status:  WNL Pulmonary Status:  WNL 
 
NPO:  5-8 Assessment/Impression: Hx of colon polyps Plan of treatment: Colonoscopy Rafa Erickson MD 
1/22/2019 
8:03 AM

## 2019-01-22 NOTE — PERIOP NOTES
Phase 2 Recovery Summary Patient arrived to Phase 2 at 12. Report received from [employer]. Patient denies any pain at this time. Patient tolerated procedure well. Vitals:  
 01/21/19 1455 01/22/19 2485 01/22/19 2285 01/22/19 0840 BP:  140/86 140/85 133/90 Pulse:  76 78 66 Resp:  16 16 18 Temp:  97.3 °F (36.3 °C) 98 °F (36.7 °C) SpO2:  98% 99% 99% Weight: 99.8 kg (220 lb) 104.3 kg (230 lb) Height: 5' 6\" (1.676 m) 5' 6\" (1.676 m)    
 
 
oriented to time, place, person and situation Lines and Drains Peripheral Intravenous Line:  
Peripheral IV 01/22/19 Right Hand (Active) Site Assessment Clean, dry, & intact 1/22/2019  7:26 AM  
Phlebitis Assessment 0 1/22/2019  7:26 AM  
Infiltration Assessment 0 1/22/2019  7:26 AM  
Dressing Status Clean, dry, & intact 1/22/2019  7:26 AM  
Dressing Type Transparent;Tape 1/22/2019  7:26 AM  
Hub Color/Line Status Pink; Infusing 1/22/2019  7:26 AM  
 
 
Wound Wound Knee Left (Active) Number of days: 5295 No questions or concerns at this time. Patient discharged to home with wife at 45.  
 
Gurpreet Basilio RN

## 2019-01-22 NOTE — ANESTHESIA PREPROCEDURE EVALUATION
Anesthetic History No history of anesthetic complications Review of Systems / Medical History Patient summary reviewed and pertinent labs reviewed Pulmonary Within defined limits Undiagnosed apnea Neuro/Psych Psychiatric history Cardiovascular Hypertension Exercise tolerance: >4 METS 
  
GI/Hepatic/Renal 
Within defined limits Endo/Other Diabetes Morbid obesity and arthritis Other Findings Physical Exam 
 
Airway Mallampati: II 
TM Distance: 4 - 6 cm Neck ROM: normal range of motion Mouth opening: Normal 
 
 Cardiovascular Regular rate and rhythm,  S1 and S2 normal,  no murmur, click, rub, or gallop Rhythm: regular Rate: normal 
 
 
 
 Dental 
 
Dentition: Full upper dentures and Lower partial plate Comments: 2 native lower teeth Pulmonary Breath sounds clear to auscultation Abdominal 
GI exam deferred Other Findings Anesthetic Plan ASA: 3 Anesthesia type: MAC Induction: Intravenous Anesthetic plan and risks discussed with: Patient

## 2019-01-22 NOTE — ANESTHESIA POSTPROCEDURE EVALUATION
Procedure(s): 
COLONOSCOPY. Anesthesia Post Evaluation Multimodal analgesia: multimodal analgesia not used between 6 hours prior to anesthesia start to PACU discharge Patient location during evaluation: bedside Patient participation: complete - patient participated Level of consciousness: awake Pain score: 0 Pain management: adequate Airway patency: patent Anesthetic complications: no 
Cardiovascular status: acceptable Respiratory status: acceptable Hydration status: acceptable Post anesthesia nausea and vomiting:  none Visit Vitals /85 Pulse 78 Temp 36.7 °C (98 °F) Resp 16 Ht 5' 6\" (1.676 m) Wt 104.3 kg (230 lb) SpO2 99% BMI 37.12 kg/m²

## 2019-01-22 NOTE — DISCHARGE INSTRUCTIONS
For the next 12 hours you should not:   1. drive   2. drink alcohol   3. operate any machinery   4. engage in activities that require mental sharpness or manual dexterity such as     cooking   5. take any drugs other than those prescribed by a physician   6. make any legal or financial decisions  Call your doctor's office immediately, if:   1. increased and continuing rectal bleeding   2. fever   3. increased abdominal pain    Take it easy today and resume normal activity tomorrow. Resume previous diet. DISCHARGE SUMMARY from Nurse    PATIENT INSTRUCTIONS:    After general anesthesia or intravenous sedation, for 24 hours or while taking prescription Narcotics:  · Limit your activities  · Do not drive and operate hazardous machinery  · Do not make important personal or business decisions  · Do  not drink alcoholic beverages  · If you have not urinated within 8 hours after discharge, please contact your surgeon on call. Report the following to your surgeon:  · Excessive pain, swelling, redness or odor of or around the surgical area  · Temperature over 100.5  · Nausea and vomiting lasting longer than 4 hours or if unable to take medications  · Any signs of decreased circulation or nerve impairment to extremity: change in color, persistent  numbness, tingling, coldness or increase pain  · Any questions    What to do at Home:  Recommended activity: Activity as tolerated and no driving for today,   These are general instructions for a healthy lifestyle:    No smoking/ No tobacco products/ Avoid exposure to second hand smoke  Surgeon General's Warning:  Quitting smoking now greatly reduces serious risk to your health.     Obesity, smoking, and sedentary lifestyle greatly increases your risk for illness    A healthy diet, regular physical exercise & weight monitoring are important for maintaining a healthy lifestyle    You may be retaining fluid if you have a history of heart failure or if you experience any of the following symptoms:  Weight gain of 3 pounds or more overnight or 5 pounds in a week, increased swelling in our hands or feet or shortness of breath while lying flat in bed. Please call your doctor as soon as you notice any of these symptoms; do not wait until your next office visit. Recognize signs and symptoms of STROKE:    F-face looks uneven    A-arms unable to move or move unevenly    S-speech slurred or non-existent    T-time-call 911 as soon as signs and symptoms begin-DO NOT go       Back to bed or wait to see if you get better-TIME IS BRAIN. Warning Signs of HEART ATTACK     Call 911 if you have these symptoms:   Chest discomfort. Most heart attacks involve discomfort in the center of the chest that lasts more than a few minutes, or that goes away and comes back. It can feel like uncomfortable pressure, squeezing, fullness, or pain.  Discomfort in other areas of the upper body. Symptoms can include pain or discomfort in one or both arms, the back, neck, jaw, or stomach.  Shortness of breath with or without chest discomfort.  Other signs may include breaking out in a cold sweat, nausea, or lightheadedness. Don't wait more than five minutes to call 911 - MINUTES MATTER! Fast action can save your life. Calling 911 is almost always the fastest way to get lifesaving treatment. Emergency Medical Services staff can begin treatment when they arrive -- up to an hour sooner than if someone gets to the hospital by car. The discharge information has been reviewed with the patient. The patient verbalized understanding. Discharge medications reviewed with the patient and appropriate educational materials and side effects teaching were provided. ___________________________________________________________________________________________________________________________________         Patient armband removed and given to patient to take home.   Patient was informed of the privacy risks if armband lost or stolen

## 2019-01-26 DIAGNOSIS — Z76.0 MEDICATION REFILL: ICD-10-CM

## 2019-01-26 DIAGNOSIS — G89.29 OTHER CHRONIC PAIN: ICD-10-CM

## 2019-01-28 RX ORDER — OXYCODONE AND ACETAMINOPHEN 7.5; 325 MG/1; MG/1
TABLET ORAL
Qty: 30 TAB | Refills: 0 | Status: SHIPPED | OUTPATIENT
Start: 2019-01-28 | End: 2019-02-23 | Stop reason: SDUPTHER

## 2019-01-28 NOTE — TELEPHONE ENCOUNTER
PCP: Lane Brown MD    Last appt: 11/15/2018  Future Appointments   Date Time Provider Shirin Muñoz   2/14/2019  9:30 AM Jill Rodríguez MD [facility] SCHED       Requested Prescriptions     Pending Prescriptions Disp Refills    oxyCODONE-acetaminophen (PERCOCET) 7.5-325 mg per tablet 30 Tab 0     Sig: Take one po daily prn pain       Request for a 30 or 90 day supply? Provider Discretion    Pharmacy: [facility] Ita Ch[facility]vez    Other Comments:   printed and placed in PCP medication refill review folder.      Last UDS date: 11/15/2018  Pain contract signed: 07/25/2018

## 2019-01-28 NOTE — TELEPHONE ENCOUNTER
Printed rx for:    Requested Prescriptions     Signed Prescriptions Disp Refills    oxyCODONE-acetaminophen (PERCOCET) 7.5-325 mg per tablet 30 Tab 0     Sig: Take one po daily prn pain     Authorizing Provider: Amy De La Fuente     Please let pt know that this is ready for . Checked  and no aberrancies seen.

## 2019-02-18 ENCOUNTER — HOSPITAL ENCOUNTER (OUTPATIENT)
Dept: LAB | Age: 59
Discharge: HOME | End: 2019-02-18
Payer: MEDICARE

## 2019-02-18 ENCOUNTER — OFFICE VISIT (OUTPATIENT)
Dept: INTERNAL MEDICINE CLINIC | Age: 59
End: 2019-02-18

## 2019-02-18 ENCOUNTER — TELEPHONE (OUTPATIENT)
Dept: INTERNAL MEDICINE CLINIC | Age: 59
End: 2019-02-18

## 2019-02-18 VITALS
HEIGHT: 66 IN | DIASTOLIC BLOOD PRESSURE: 82 MMHG | WEIGHT: 226.2 LBS | OXYGEN SATURATION: 98 % | BODY MASS INDEX: 36.35 KG/M2 | RESPIRATION RATE: 16 BRPM | HEART RATE: 67 BPM | TEMPERATURE: 98 F | SYSTOLIC BLOOD PRESSURE: 138 MMHG

## 2019-02-18 DIAGNOSIS — E78.5 DYSLIPIDEMIA: ICD-10-CM

## 2019-02-18 DIAGNOSIS — I10 BENIGN HYPERTENSION WITHOUT CHF: Primary | ICD-10-CM

## 2019-02-18 DIAGNOSIS — E11.9 DIABETES MELLITUS, STABLE (HCC): Primary | ICD-10-CM

## 2019-02-18 DIAGNOSIS — E11.9 DIABETES MELLITUS, STABLE (HCC): ICD-10-CM

## 2019-02-18 DIAGNOSIS — I10 BENIGN HYPERTENSION WITHOUT CHF: ICD-10-CM

## 2019-02-18 PROBLEM — E66.01 SEVERE OBESITY (BMI 35.0-39.9) WITH COMORBIDITY (HCC): Status: RESOLVED | Noted: 2018-05-14 | Resolved: 2019-02-18

## 2019-02-18 LAB
ALBUMIN SERPL-MCNC: 4 G/DL (ref 3.4–5)
ALBUMIN/GLOB SERPL: 1.4 {RATIO} (ref 0.8–1.7)
ALP SERPL-CCNC: 90 U/L (ref 45–117)
ALT SERPL-CCNC: 36 U/L (ref 16–61)
ANION GAP SERPL CALC-SCNC: 8 MMOL/L (ref 3–18)
APPEARANCE UR: CLEAR
AST SERPL-CCNC: 18 U/L (ref 15–37)
BILIRUB SERPL-MCNC: 0.5 MG/DL (ref 0.2–1)
BILIRUB UR QL: NEGATIVE
BUN SERPL-MCNC: 19 MG/DL (ref 7–18)
BUN/CREAT SERPL: 17 (ref 12–20)
CALCIUM SERPL-MCNC: 8.9 MG/DL (ref 8.5–10.1)
CHLORIDE SERPL-SCNC: 103 MMOL/L (ref 100–108)
CHOLEST SERPL-MCNC: 127 MG/DL
CO2 SERPL-SCNC: 27 MMOL/L (ref 21–32)
COLOR UR: NORMAL
CREAT SERPL-MCNC: 1.15 MG/DL (ref 0.6–1.3)
ERYTHROCYTE [DISTWIDTH] IN BLOOD BY AUTOMATED COUNT: 13.1 % (ref 11.6–14.5)
EST. AVERAGE GLUCOSE BLD GHB EST-MCNC: 157 MG/DL
GLOBULIN SER CALC-MCNC: 2.9 G/DL (ref 2–4)
GLUCOSE SERPL-MCNC: 115 MG/DL (ref 74–99)
GLUCOSE UR STRIP.AUTO-MCNC: NEGATIVE MG/DL
HBA1C MFR BLD: 7.1 % (ref 4.2–5.6)
HCT VFR BLD AUTO: 51 % (ref 36–48)
HDLC SERPL-MCNC: 44 MG/DL (ref 40–60)
HDLC SERPL: 2.9 {RATIO} (ref 0–5)
HGB BLD-MCNC: 16.8 G/DL (ref 13–16)
HGB UR QL STRIP: NEGATIVE
KETONES UR QL STRIP.AUTO: NEGATIVE MG/DL
LDLC SERPL CALC-MCNC: 69.6 MG/DL (ref 0–100)
LEUKOCYTE ESTERASE UR QL STRIP.AUTO: NEGATIVE
LIPID PROFILE,FLP: NORMAL
MCH RBC QN AUTO: 26.2 PG (ref 24–34)
MCHC RBC AUTO-ENTMCNC: 32.9 G/DL (ref 31–37)
MCV RBC AUTO: 79.4 FL (ref 74–97)
NITRITE UR QL STRIP.AUTO: NEGATIVE
PH UR STRIP: 5.5 [PH] (ref 5–8)
PLATELET # BLD AUTO: 210 K/UL (ref 135–420)
PMV BLD AUTO: 11 FL (ref 9.2–11.8)
POTASSIUM SERPL-SCNC: 3.9 MMOL/L (ref 3.5–5.5)
PROT SERPL-MCNC: 6.9 G/DL (ref 6.4–8.2)
PROT UR STRIP-MCNC: NEGATIVE MG/DL
RBC # BLD AUTO: 6.42 M/UL (ref 4.7–5.5)
SODIUM SERPL-SCNC: 138 MMOL/L (ref 136–145)
SP GR UR REFRACTOMETRY: 1.03 (ref 1–1.03)
TRIGL SERPL-MCNC: 67 MG/DL (ref ?–150)
TSH SERPL DL<=0.05 MIU/L-ACNC: 0.94 UIU/ML (ref 0.36–3.74)
UROBILINOGEN UR QL STRIP.AUTO: 0.2 EU/DL (ref 0.2–1)
VLDLC SERPL CALC-MCNC: 13.4 MG/DL
WBC # BLD AUTO: 4.4 K/UL (ref 4.6–13.2)

## 2019-02-18 PROCEDURE — 80061 LIPID PANEL: CPT

## 2019-02-18 PROCEDURE — 84443 ASSAY THYROID STIM HORMONE: CPT

## 2019-02-18 PROCEDURE — 81003 URINALYSIS AUTO W/O SCOPE: CPT

## 2019-02-18 PROCEDURE — 80053 COMPREHEN METABOLIC PANEL: CPT

## 2019-02-18 PROCEDURE — 36415 COLL VENOUS BLD VENIPUNCTURE: CPT

## 2019-02-18 PROCEDURE — 83036 HEMOGLOBIN GLYCOSYLATED A1C: CPT

## 2019-02-18 PROCEDURE — 82043 UR ALBUMIN QUANTITATIVE: CPT

## 2019-02-18 PROCEDURE — 85027 COMPLETE CBC AUTOMATED: CPT

## 2019-02-18 RX ORDER — LANCETS
EACH MISCELLANEOUS
Qty: 100 EACH | Refills: 11 | Status: SHIPPED | OUTPATIENT
Start: 2019-02-18 | End: 2019-02-18 | Stop reason: SDUPTHER

## 2019-02-18 RX ORDER — LANCETS
EACH MISCELLANEOUS
Qty: 100 EACH | Refills: 11 | Status: SHIPPED | OUTPATIENT
Start: 2019-02-18 | End: 2019-02-20 | Stop reason: SDUPTHER

## 2019-02-18 RX ORDER — INSULIN PUMP SYRINGE, 3 ML
EACH MISCELLANEOUS
COMMUNITY

## 2019-02-18 NOTE — PROGRESS NOTES
Chief Complaint   Patient presents with    Hypertension     3 month f/u    Diabetes     3 month f/u    Cholesterol Problem     3 month f/u       HPI:     Diego Arrieta is a 60 y.o.  male with history of type 2 DM, hypertension and dyslipidemia  here for the above complaint. He denies any chest pain, shortness of breath, abdominal pain, headaches or dizziness. Type 2 DM: last one 104. He has not been checking his sugars every day due to out of test strips. He did not call the office to get a refill. Past Medical History:   Diagnosis Date    Advance directive discussed with patient 04/24/2017    Back injury 4/27/2007    Behavioral change     Chronic pain     CKD (chronic kidney disease) stage 2, GFR 60-89 ml/min 12/20/2013    DDD (degenerative disc disease)     Depression     Diabetes mellitus, stable ([address]     Diabetic eye exam ([facility] [address] 09/09/2016    Dr. Dequan Trotter ( bilateral cataracts)    Difficulty walking     DJD (degenerative joint disease)     Generalized stiffness     H/O colonoscopy with polypectomy 01/22/2019    Diminutive, 2 mm, polyp in the ascending colon, biopsied.   Dr. Francheska Quinonez (rectum biopsy--hyperplastic polyp)    High cholesterol     Hypertension     Insomnia     Lower extremity edema     Lymphedema     Osteoarthritis     Other and unspecified hyperlipidemia     Type II or unspecified type diabetes mellitus without mention of complication, not stated as uncontrolled      Past Surgical History:   Procedure Laterality Date    COLONOSCOPY N/A 1/22/2019    COLONOS[facility] performed [facility] at [facility] ENDOSCOPY    HX COLONOSCOPY      HX ORTHOPAEDIC      left elbow surgery    HX ORTHOPAEDIC Left 1/19/16    left Total Knee replacement    HX OTHER SURGICAL  02/2018    removal of abscess in back   [address] N/A 07/65/2686    Dr. Leif Porter     Current Outpatient Medications   Medication Sig    Blood-Glucose Meter monitoring kit by Does Not Apply route.  lancets misc Check fasting sugars daily before breakfast. One touch meter    glucose blood VI test strips (ASCENSIA AUTODISC VI, ONE TOUCH ULTRA TEST VI) strip Check fasting sugars daily before breakfast    oxyCODONE-acetaminophen (PERCOCET) 7.5-325 mg per tablet Take one po daily prn pain    metFORMIN (GLUCOPHAGE) 500 mg tablet Take 0.5 Tabs by mouth two (2) times daily (with meals).  diclofenac (VOLTAREN) 1 % gel Apply  to affected area four (4) times daily.  naloxone (NARCAN) 4 mg/actuation nasal spray Use 1 spray intranasally into 1 nostril. Use a new Narcan nasal spray for subsequent doses and administer into alternating nostrils.  amLODIPine-benazepril (LOTREL) 5-20 mg per capsule TAKE ONE CAPSULE BY MOUTH ONCE DAILY    atorvastatin (LIPITOR) 10 mg tablet TAKE 1 TABLET BY MOUTH DAILY    metoprolol tartrate (LOPRESSOR) 50 mg tablet TAKE 1 TABLET BY MOUTH TWICE A DAY    gabapentin (NEURONTIN) 300 mg capsule TAKE 2 CAPSULES BY MOUTH EVERY MORNING AND 2 CAPSULES EVERY EVENING    furosemide (LASIX) 20 mg tablet Take one po daily prn swelling    Aspirin, Buffered 81 mg tab Take 81 mg by mouth daily. No current facility-administered medications for this visit.       Health Maintenance   Topic Date Due    Shingrix Vaccine Age 49> (1 of 2) 12/17/2009    FOOT EXAM Q1  01/25/2018    EYE EXAM RETINAL OR DILATED  09/09/2018    MICROALBUMIN Q1  02/22/2019    HEMOGLOBIN A1C Q6M  05/15/2019    MEDICARE YEARLY EXAM  08/23/2019    LIPID PANEL Q1  11/15/2019    COLONOSCOPY  01/22/2024    DTaP/Tdap/Td series (2 - Td) 04/24/2027    Hepatitis C Screening  Completed    Pneumococcal 19-64 Medium Risk  Completed    Influenza Age 5 to Adult  Completed     Immunization History   Administered Date(s) Administered    Influenza Vaccine 02/06/2014, 09/09/2014    Influenza Vaccine (Quad) PF 11/23/2015, 01/23/2017, 10/24/2017, 11/15/2018    Pneumococcal Polysaccharide (PPSV-23) 10/24/2017     No LMP for male patient. Allergies and Intolerances:   No Known Allergies    Family History:   Family History   Problem Relation Age of Onset   [facility] Mother     Hypertension Mother     Seizures Mother     Diabetes Father     Heart Disease Father     Hypertension Father     Other Maternal Grandmother         tumor in abdomen       Social History:   He  reports that  has never smoked. he has never used smokeless tobacco.  He  reports that he does not drink alcohol. ROS:  · General: negative for - chills, fever, weight changes or malaise, night sweats  · HEENT: no sore throat, nasal congestion,  vision problems or ear problems  · Respiratory: no cough, shortness of breath, or wheezing  · Cardiovascular: no chest pain, palpitations, or dyspnea on exertion; no orthopnea or PND  · Gastrointestinal: no abdominal pain, N/V, change in bowel habits,  black or bloody stools, constipation or diarrhea  · Musculoskeletal: no back pain, joint pain, joint stiffness, muscle pain or muscle weakness  · Neurological: no numbness, tingling, headache or dizziness  · Psychological: negative for - anxiety, depression, sleep disturbances, suicidal or homicidal ideations    Objective:   Physical exam:   Visit Vitals  /82 (BP 1 Location: Right arm, BP Patient Position: Sitting)   Pulse 67   Temp 98 °F (36.7 °C) (Oral)   Resp 16   Ht 5' 6\" (1.676 m)   Wt 226 lb 3.2 oz (102.6 kg)   SpO2 98%   BMI 36.51 kg/m²        Generally: Pleasant male in no acute distress  Cardiac Exam: regular, rate, and rhythm. Normal S1 and S2. No murmurs, gallops, or rubs  Pulmonary exam: Clear to auscultation bilaterally  Abdominal exam: Positive bowel sounds in all four quadrants, soft, nondistended, nontender  Extremities: 2+ dorsalis pedis pulses bilaterally.  No pedal edema    bilaterally    LABS/Radiological Tests:  Component      Latest Ref Rng & Units 11/15/2018 11/15/2018 11/15/2018 11/15/2018           3:43 PM  3:43 PM 10:29 AM 10:29 AM   Sodium      136 - 145 mmol/L  140     Potassium      3.5 - 5.5 mmol/L  4.2     Chloride      100 - 108 mmol/L  102     CO2      21 - 32 mmol/L  30     Anion gap      3.0 - 18 mmol/L  8     Glucose      74 - 99 mg/dL  123 (H)     BUN      7.0 - 18 MG/DL  15     Creatinine      0.6 - 1.3 MG/DL  1.28     BUN/Creatinine ratio      12 - 20    12     GFR est AA      >60 ml/min/1.73m2  >60     GFR est non-AA      >60 ml/min/1.73m2  58 (L)     Calcium      8.5 - 10.1 MG/DL  8.7     Bilirubin, total      0.2 - 1.0 MG/DL  0.6     ALT (SGPT)      16 - 61 U/L  41     AST      15 - 37 U/L  20     Alk. phosphatase      45 - 117 U/L  105     Protein, total      6.4 - 8.2 g/dL  7.0     Albumin      3.4 - 5.0 g/dL  3.7     Globulin      2.0 - 4.0 g/dL  3.3     A-G Ratio      0.8 - 1.7    1.1     Cholesterol, total      <200 MG/      Triglyceride      <150 MG/DL 94      HDL Cholesterol      40 - 60 MG/DL 42      LDL, calculated      0 - 100 MG/DL 81.2      VLDL, calculated      MG/DL 18.8      CHOL/HDL Ratio      0 - 5.0   3.4      Hemoglobin A1c, (calculated)      4.2 - 5.6 %    7.3 (H)   Est. average glucose      mg/dL    163   Summary         FINAL      Component      Latest Ref Rng & Units 8/22/2018 8/22/2018          10:05 AM 10:05 AM   Sodium      136 - 145 mmol/L     Potassium      3.5 - 5.5 mmol/L     Chloride      100 - 108 mmol/L     CO2      21 - 32 mmol/L     Anion gap      3.0 - 18 mmol/L     Glucose      74 - 99 mg/dL     BUN      7.0 - 18 MG/DL     Creatinine      0.6 - 1.3 MG/DL     BUN/Creatinine ratio      12 - 20       GFR est AA      >60 ml/min/1.73m2     GFR est non-AA      >60 ml/min/1.73m2     Calcium      8.5 - 10.1 MG/DL     Bilirubin, total      0.2 - 1.0 MG/DL     ALT (SGPT)      16 - 61 U/L     AST      15 - 37 U/L     Alk.  phosphatase      45 - 117 U/L     Protein, total      6.4 - 8.2 g/dL     Albumin      3.4 - 5.0 g/dL     Globulin 2.0 - 4.0 g/dL     A-G Ratio      0.8 - 1.7       Cholesterol, total      <200 MG/DL  146   Triglyceride      <150 MG/DL  94   HDL Cholesterol      40 - 60 MG/DL  42   LDL, calculated      0 - 100 MG/DL  85.2   VLDL, calculated      MG/DL  18.8   CHOL/HDL Ratio      0 - 5.0    3.5   Hemoglobin A1c, (calculated)      4.2 - 5.6 % 7.1 (H)    Est. average glucose      mg/dL 157    Summary             All lab results were discussed and reviewed with the patient. ASSESSMENT/PLAN:    1. Benign hypertension without CHF: stable. Continue diet, exercise and lotrel, lopressor.   -     CBC W/O DIFF; Future  -     URINALYSIS W/ RFLX MICROSCOPIC; Future    2. Dyslipidemia:we will see what the labs show. Continue the lipitor, diet and exercise. -     METABOLIC PANEL, COMPREHENSIVE; Future  -     LIPID PANEL; Future    3. Diabetes mellitus, stable ([address]): stable. We will see what the labs show. Continue the metformin, diet and exercise. Assessment & Plan:    Key Antihyperglycemic Medications             metFORMIN (GLUCOPHAGE) 500 mg tablet  (Taking) Take 0.5 Tabs by mouth two (2) times daily (with meals).         Other Key Diabetic Medications             amLODIPine-benazepril (LOTREL) 5-20 mg per capsule  (Taking) TAKE ONE CAPSULE BY MOUTH ONCE DAILY    atorvastatin (LIPITOR) 10 mg tablet  (Taking) TAKE 1 TABLET BY MOUTH DAILY    gabapentin (NEURONTIN) 300 mg capsule  (Taking) TAKE 2 CAPSULES BY MOUTH EVERY MORNING AND 2 CAPSULES EVERY EVENING        Lab Results   Component Value Date/Time    Hemoglobin A1c 7.3 11/15/2018 10:29 AM    Glucose 123 11/15/2018 03:43 PM    Creatinine 1.28 11/15/2018 03:43 PM    Microalbumin/Creat ratio (mg/g creat) 3 02/22/2018 09:34 AM    Microalbumin,urine random 1.10 02/22/2018 09:34 AM    Cholesterol, total 142 11/15/2018 03:43 PM    HDL Cholesterol 42 11/15/2018 03:43 PM    LDL, calculated 81.2 11/15/2018 03:43 PM    Triglyceride 94 11/15/2018 03:43 PM     Diabetic Foot and Eye Exam HM Status   Topic Date Due    Diabetic Foot Care  01/25/2018    Eye Exam  09/09/2018       Orders:  -     MICROALBUMIN, UR, RAND W/ MICROALB/CREAT RATIO; Future  -     TSH 3RD GENERATION; Future  -     HEMOGLOBIN A1C WITH EAG; Future    Other orders  -     lancets misc; Check fasting sugars daily before breakfast. One touch meter  -     glucose blood VI test strips (ASCENSIA AUTODISC VI, ONE TOUCH ULTRA TEST VI) strip; Check fasting sugars daily before breakfast  4. Requested Prescriptions     Signed Prescriptions Disp Refills    lancets misc 100 Each 11     Sig: Check fasting sugars daily before breakfast. One touch meter    glucose blood VI test strips (ASCENSIA AUTODISC VI, ONE TOUCH ULTRA TEST VI) strip 100 Strip 11     Sig: Check fasting sugars daily before breakfast       5. Patient verbalized understanding and agreement with the plan. 6. Patient was given an after-visit summary. 7. Follow-up Disposition:  Return in about 3 months (around 5/18/2019) for f/u DM/HTN/lipids. or sooner if worsening symptoms.                 Lavell Bonilla MD

## 2019-02-18 NOTE — TELEPHONE ENCOUNTER
Sent electronically:    Requested Prescriptions     Signed Prescriptions Disp Refills    glucose blood VI test strips (ASCENSIA AUTODISC VI, ONE TOUCH ULTRA TEST VI) strip 100 Strip 11     Sig: Check fasting sugars daily before breakfast ICD-10. E11.9     Authorizing Provider: Lan MAY    lancets misc 100 Each 11     Sig: Check fasting sugars daily before breakfast. One touch meter. ICD-10. E11.9     Authorizing Provider: Steph Johnston

## 2019-02-18 NOTE — TELEPHONE ENCOUNTER
glucose blood VI test strips (ASCENSIA AUTODISC VI, ONE TOUCH ULTRA TEST VI) strip     Per pharmacy \" missing/illegible information on rx-sig code: patient has medicare part D, prescription needs diagnosis code. \"

## 2019-02-18 NOTE — PROGRESS NOTES
ROOM # 1    Julien Course presents today for   Chief Complaint   Patient presents with    Hypertension     3 month f/u    Diabetes     3 month f/u    Cholesterol Problem     3 month f/u       Julien Course preferred language for health care discussion is english/other. Is someone accompanying this pt? no    Is the patient using any DME equipment during OV? no    Depression Screening:  3 most recent [facility] [place] [facility] Screens 11/2/2018 5/22/2018 2/22/2018 10/24/2017 7/24/2017 4/24/2017 9/22/2016   Little interest or pleasure in doing things Not at all Not at all Not at all Not at all Not at all Not at all Not at all   Feeling down, depressed, irritable, or hopeless Not at all Not at all Not at all Not at all Not at all Not at all Not at all   Total Score PHQ 2 0 0 0 0 0 0 0       Learning Assessment:  Learning Assessment 2/14/2018 1/14/2015 12/20/2013   PRIMARY LEARNER Patient Patient Patient   HIGHEST LEVEL OF EDUCATION - PRIMARY LEARNER  - DID NOT GRADUATE HIGH SCHOOL DID NOT GRADUATE [address]    NEED - - No   LEARNER [address] [facility] - - no   ANSWERED BY self patient patient   RELATIONSHIP SELF SELF SELF       Abuse Screening:  Abuse Screening Questionnaire 10/24/2017   Do you ever feel afraid of your partner? N   Are you in a relationship with someone who physically or mentally threatens you? N   Is it safe for you to go home? Y       Fall Risk  No flowsheet data found. Health Maintenance reviewed and discussed per provider. Yes    Julien Course is due for   Health Maintenance Due   Topic Date Due    Shingrix Vaccine Age 50> (1 of 2) 12/17/2009    FOOT EXAM Q1  01/25/2018    EYE EXAM RETINAL OR DILATED  09/09/2018    MICROALBUMIN Q1  02/22/2019   HM to be d/w provider      Please order/place referral if appropriate.       Advance Directive:  1. Do you have an advance directive in place? Patient Reply: no    2. If not, would you like material regarding how to put one in place? Patient Reply: no    Coordination of Care:  1. Have you been to the ER, urgent care clinic since your last visit? Hospitalized since your last visit? no    2. Have you seen or consulted any other health care providers outside of the [address] [place] [address] since your last visit? Include any pap smears or colon screening.  no

## 2019-02-18 NOTE — ASSESSMENT & PLAN NOTE
Key Antihyperglycemic Medications             metFORMIN (GLUCOPHAGE) 500 mg tablet  (Taking) Take 0.5 Tabs by mouth two (2) times daily (with meals).         Other Key Diabetic Medications             amLODIPine-benazepril (LOTREL) 5-20 mg per capsule  (Taking) TAKE ONE CAPSULE BY MOUTH ONCE DAILY    atorvastatin (LIPITOR) 10 mg tablet  (Taking) TAKE 1 TABLET BY MOUTH DAILY    gabapentin (NEURONTIN) 300 mg capsule  (Taking) TAKE 2 CAPSULES BY MOUTH EVERY MORNING AND 2 CAPSULES EVERY EVENING        Lab Results   Component Value Date/Time    Hemoglobin A1c 7.3 11/15/2018 10:29 AM    Glucose 123 11/15/2018 03:43 PM    Creatinine 1.28 11/15/2018 03:43 PM    Microalbumin/Creat ratio (mg/g creat) 3 02/22/2018 09:34 AM    Microalbumin,urine random 1.10 02/22/2018 09:34 AM    Cholesterol, total 142 11/15/2018 03:43 PM    HDL Cholesterol 42 11/15/2018 03:43 PM    LDL, calculated 81.2 11/15/2018 03:43 PM    Triglyceride 94 11/15/2018 03:43 PM     Diabetic Foot and Eye Exam HM Status   Topic Date Due    Diabetic Foot Care  01/25/2018    Eye Exam  09/09/2018

## 2019-02-19 LAB
CREAT UR-MCNC: 386 MG/DL (ref 30–125)
MICROALBUMIN UR-MCNC: 5.55 MG/DL (ref 0–3)
MICROALBUMIN/CREAT UR-RTO: 14 MG/G (ref 0–30)

## 2019-02-20 RX ORDER — LANCETS
EACH MISCELLANEOUS
Qty: 100 EACH | Refills: 11 | Status: SHIPPED | OUTPATIENT
Start: 2019-02-20

## 2019-02-20 NOTE — TELEPHONE ENCOUNTER
Received another fax for more clarification. Sent electronically:    Requested Prescriptions     Signed Prescriptions Disp Refills    glucose blood VI test strips (ASCENSIA AUTODISC VI, ONE TOUCH ULTRA TEST VI) strip 100 Strip 11     Sig: Check fasting sugars daily before breakfast .Use 1 strip to test blood sugar daily before breakfast. ICD-10. E11.9     Authorizing Provider: Alben Lundborg T    lancets misc 100 Each 11     Sig: Check fasting sugars daily before breakfast.  Use 1 strip to test blood sugar daily before breakfast. One touch meter. ICD-10. E11.9     Authorizing Provider: Taryn Ha

## 2019-02-23 DIAGNOSIS — Z76.0 MEDICATION REFILL: ICD-10-CM

## 2019-02-23 DIAGNOSIS — G89.29 OTHER CHRONIC PAIN: ICD-10-CM

## 2019-02-25 RX ORDER — OXYCODONE AND ACETAMINOPHEN 7.5; 325 MG/1; MG/1
TABLET ORAL
Qty: 30 TAB | Refills: 0 | Status: SHIPPED | OUTPATIENT
Start: 2019-02-25 | End: 2019-03-25 | Stop reason: SDUPTHER

## 2019-02-25 NOTE — TELEPHONE ENCOUNTER
Checked  and no aberrancies seen. Printed rx for:    Requested Prescriptions     Signed Prescriptions Disp Refills    oxyCODONE-acetaminophen (PERCOCET) 7.5-325 mg per tablet 30 Tab 0     Sig: Take one po daily prn pain     Authorizing Provider: Marlene Talavera     Please let pt know that this is ready for .

## 2019-02-25 NOTE — TELEPHONE ENCOUNTER
PCP: Lázaro Retana MD    Last appt: 2/18/2019  Future Appointments   Date Time Provider Shirin Muñoz   5/20/2019  9:45 AM Simba Rodríguez MD [facility]       Requested Prescriptions     Pending Prescriptions Disp Refills    oxyCODONE-acetaminophen (PERCOCET) 7.5-325 mg per tablet 30 Tab 0     Sig: Take one po daily prn pain       Request for a 30 or 90 day supply? Provider Discretion    Pharmacy: [facility]     Other Comments:   printed and placed in PCP medication refill review folder.      Last UDS date: 11/15/2018  Pain contract signed: 07/25/2018

## 2019-03-25 DIAGNOSIS — G89.29 OTHER CHRONIC PAIN: ICD-10-CM

## 2019-03-25 DIAGNOSIS — Z76.0 MEDICATION REFILL: ICD-10-CM

## 2019-03-25 RX ORDER — NALOXONE HYDROCHLORIDE 4 MG/.1ML
SPRAY NASAL
Qty: 1 EACH | Refills: 1 | Status: SHIPPED | OUTPATIENT
Start: 2019-03-25

## 2019-03-25 RX ORDER — OXYCODONE AND ACETAMINOPHEN 7.5; 325 MG/1; MG/1
1 TABLET ORAL
Qty: 30 TAB | Refills: 0 | Status: SHIPPED | OUTPATIENT
Start: 2019-03-25 | End: 2019-04-24

## 2019-03-25 NOTE — TELEPHONE ENCOUNTER
PCP: Matilde Alfonso MD    Last appt: 2/18/2019  Future Appointments   Date Time Provider Shirin Muñoz   5/20/2019  9:45 AM Lorraine Rodríguez MD [facility]       Requested Prescriptions     Pending Prescriptions Disp Refills    oxyCODONE-acetaminophen (PERCOCET) 7.5-325 mg per tablet 30 Tab 0     Sig: Take one po daily prn pain       Request for a 30 or 90 day supply? Provider Discretion    Pharmacy: [place], [place]    Other Comments:   printed and placed in PCP medication refill review folder.      Last UDS date: 11/15/2018  Pain contract signed: 4/24/2017

## 2019-03-25 NOTE — TELEPHONE ENCOUNTER
Patient request, last OV 2/18/19, last scheduled 5/20/19.     Requested Prescriptions     Pending Prescriptions Disp Refills    oxyCODONE-acetaminophen (PERCOCET) 7.5-325 mg per tablet 30 Tab 0     Sig: Take one po daily prn pain

## 2019-03-25 NOTE — TELEPHONE ENCOUNTER
Pain agreement signed on 7/25/18. Printed rx for percocet. Sent electronically narcan. Checked  and no aberrancies seen. Requested Prescriptions     Signed Prescriptions Disp Refills    oxyCODONE-acetaminophen (PERCOCET) 7.5-325 mg per tablet 30 Tab 0     Sig: Take 1 Tab by mouth daily as needed for Pain for up to 30 days. Take one po daily prn pain     Authorizing Provider: Judy MAY    naloxone (NARCAN) 4 mg/actuation nasal spray 1 Each 1     Sig: Use 1 spray intranasally into 1 nostril. Use a new Narcan nasal spray for subsequent doses and administer into alternating nostrils.      Authorizing Provider: Liza Sever

## 2019-05-20 ENCOUNTER — OFFICE VISIT (OUTPATIENT)
Dept: INTERNAL MEDICINE CLINIC | Age: 59
End: 2019-05-20

## 2019-05-20 ENCOUNTER — HOSPITAL ENCOUNTER (OUTPATIENT)
Dept: LAB | Age: 59
Discharge: HOME | End: 2019-05-20
Payer: MEDICARE

## 2019-05-20 VITALS
WEIGHT: 222.8 LBS | DIASTOLIC BLOOD PRESSURE: 74 MMHG | TEMPERATURE: 98.6 F | OXYGEN SATURATION: 95 % | HEART RATE: 53 BPM | HEIGHT: 66 IN | SYSTOLIC BLOOD PRESSURE: 126 MMHG | BODY MASS INDEX: 35.81 KG/M2 | RESPIRATION RATE: 17 BRPM

## 2019-05-20 DIAGNOSIS — E11.9 DIABETES MELLITUS, STABLE (HCC): ICD-10-CM

## 2019-05-20 DIAGNOSIS — I10 BENIGN HYPERTENSION WITHOUT CHF: ICD-10-CM

## 2019-05-20 DIAGNOSIS — E11.9 DIABETES MELLITUS, STABLE (HCC): Primary | ICD-10-CM

## 2019-05-20 DIAGNOSIS — E78.5 DYSLIPIDEMIA: ICD-10-CM

## 2019-05-20 LAB
ALBUMIN SERPL-MCNC: 4 G/DL (ref 3.4–5)
ALBUMIN/GLOB SERPL: 1.2 {RATIO} (ref 0.8–1.7)
ALP SERPL-CCNC: 108 U/L (ref 45–117)
ALT SERPL-CCNC: 36 U/L (ref 16–61)
ANION GAP SERPL CALC-SCNC: 7 MMOL/L (ref 3–18)
AST SERPL-CCNC: 18 U/L (ref 15–37)
BILIRUB SERPL-MCNC: 0.7 MG/DL (ref 0.2–1)
BUN SERPL-MCNC: 17 MG/DL (ref 7–18)
BUN/CREAT SERPL: 14 (ref 12–20)
CALCIUM SERPL-MCNC: 9 MG/DL (ref 8.5–10.1)
CHLORIDE SERPL-SCNC: 100 MMOL/L (ref 100–108)
CHOLEST SERPL-MCNC: 144 MG/DL
CO2 SERPL-SCNC: 33 MMOL/L (ref 21–32)
CREAT SERPL-MCNC: 1.19 MG/DL (ref 0.6–1.3)
EST. AVERAGE GLUCOSE BLD GHB EST-MCNC: 148 MG/DL
GLOBULIN SER CALC-MCNC: 3.3 G/DL (ref 2–4)
GLUCOSE SERPL-MCNC: 68 MG/DL (ref 74–99)
HBA1C MFR BLD: 6.8 % (ref 4.2–5.6)
HDLC SERPL-MCNC: 41 MG/DL (ref 40–60)
HDLC SERPL: 3.5 {RATIO} (ref 0–5)
LDLC SERPL CALC-MCNC: 79.4 MG/DL (ref 0–100)
LIPID PROFILE,FLP: NORMAL
POTASSIUM SERPL-SCNC: 4.2 MMOL/L (ref 3.5–5.5)
PROT SERPL-MCNC: 7.3 G/DL (ref 6.4–8.2)
SODIUM SERPL-SCNC: 140 MMOL/L (ref 136–145)
TRIGL SERPL-MCNC: 118 MG/DL (ref ?–150)
VLDLC SERPL CALC-MCNC: 23.6 MG/DL

## 2019-05-20 PROCEDURE — 80053 COMPREHEN METABOLIC PANEL: CPT

## 2019-05-20 PROCEDURE — 83036 HEMOGLOBIN GLYCOSYLATED A1C: CPT

## 2019-05-20 PROCEDURE — 36415 COLL VENOUS BLD VENIPUNCTURE: CPT

## 2019-05-20 PROCEDURE — 80061 LIPID PANEL: CPT

## 2019-05-20 RX ORDER — ACETAMINOPHEN 500 MG
TABLET ORAL
COMMUNITY

## 2019-05-20 RX ORDER — GLIPIZIDE 5 MG/1
TABLET ORAL 2 TIMES DAILY
COMMUNITY

## 2019-05-20 NOTE — PROGRESS NOTES
Chief Complaint   Patient presents with    Diabetes    Hypertension    Cholesterol Problem     3 month fu        HPI:     Yovany Hsu is a 60 y.o.  male with history of  Hypertension, dyslipidemia and type 2 DM here for the above complaint. He is having right hand surgery on 5/31/19. He will be having the surgery at [place], [place]. He denies any chest pain, shortness of breath, abdominal pain, headaches or dizziness. He hurt his hand in December when he was playing with his grandkids. Type 2 DM: This Am 120. Range: 100-127. Past Medical History:   Diagnosis Date    Advance directive discussed with patient 04/24/2017    Back injury 4/27/2007    Behavioral change     Chronic pain     CKD (chronic kidney disease) stage 2, GFR 60-89 ml/min 12/20/2013    DDD (degenerative disc disease)     Depression     Diabetes mellitus, stable ([address]     Diabetic eye exam [facility] [address] 09/09/2016    Dr. Gautam Henderson ( bilateral cataracts)    Difficulty walking     DJD (degenerative joint disease)     Generalized stiffness     H/O colonoscopy with polypectomy 01/22/2019    Diminutive, 2 mm, polyp in the ascending colon, biopsied.   Dr. Keshia Santiago (rectum biopsy--hyperplastic polyp)    High cholesterol     Hypertension     Insomnia     Lower extremity edema     Lymphedema     Osteoarthritis     Other and unspecified hyperlipidemia     Type II or unspecified type diabetes mellitus without mention of complication, not stated as uncontrolled      Past Surgical History:   Procedure Laterality Date    COLONOSCOPY N/A 1/22/2019    COLONOSCOPY performed by [facility]avio [facility] at [facility] ENDOSCOPY    HX COLONOSCOPY      HX ORTHOPAEDIC      left elbow surgery    HX ORTHOPAEDIC Left 1/19/16    left Total Knee replacement    HX OTHER SURGICAL  02/2018    removal of abscess in back   [address] N/A 11/25/3218    Dr. Michael Love     Current Outpatient Medications Medication Sig    acetaminophen (TYLENOL) 500 mg tablet Take  by mouth every six (6) hours as needed for Pain.  glipiZIDE (GLUCOTROL) 5 mg tablet Take  by mouth two (2) times a day.  cholecalciferol, vitamin D3, (VITAMIN D3 PO) Take  by mouth.  naloxone (NARCAN) 4 mg/actuation nasal spray Use 1 spray intranasally into 1 nostril. Use a new Narcan nasal spray for subsequent doses and administer into alternating nostrils.  glucose blood VI test strips (ASCENSIA AUTODISC VI, ONE TOUCH ULTRA TEST VI) strip Check fasting sugars daily before breakfast .Use 1 strip to test blood sugar daily before breakfast. ICD-10. E11.9    lancets misc Check fasting sugars daily before breakfast.  Use 1 strip to test blood sugar daily before breakfast. One touch meter. ICD-10. E11.9    Blood-Glucose Meter monitoring kit by Does Not Apply route.  diclofenac (VOLTAREN) 1 % gel Apply  to affected area four (4) times daily.  amLODIPine-benazepril (LOTREL) 5-20 mg per capsule TAKE ONE CAPSULE BY MOUTH ONCE DAILY    atorvastatin (LIPITOR) 10 mg tablet TAKE 1 TABLET BY MOUTH DAILY    metoprolol tartrate (LOPRESSOR) 50 mg tablet TAKE 1 TABLET BY MOUTH TWICE A DAY    gabapentin (NEURONTIN) 300 mg capsule TAKE 2 CAPSULES BY MOUTH EVERY MORNING AND 2 CAPSULES EVERY EVENING    furosemide (LASIX) 20 mg tablet Take one po daily prn swelling    Aspirin, Buffered 81 mg tab Take 81 mg by mouth daily. No current facility-administered medications for this visit.       Health Maintenance   Topic Date Due    Shingrix Vaccine Age 50> (1 of 2) 12/17/2009    FOOT EXAM Q1  01/25/2018    EYE EXAM RETINAL OR DILATED  09/09/2018    Influenza Age 5 to Adult  08/01/2019    HEMOGLOBIN A1C Q6M  08/18/2019    MEDICARE YEARLY EXAM  08/23/2019    MICROALBUMIN Q1  02/18/2020    LIPID PANEL Q1  02/18/2020    COLONOSCOPY  01/22/2024    DTaP/Tdap/Td series (2 - Td) 04/24/2027    Hepatitis C Screening  Completed    Pneumococcal 0-64 years  Completed     Immunization History   Administered Date(s) Administered    Influenza Vaccine 02/06/2014, 09/09/2014    Influenza Vaccine (Quad) PF 11/23/2015, 01/23/2017, 10/24/2017, 11/15/2018    Pneumococcal Polysaccharide (PPSV-23) 10/24/2017     No LMP for male patient. Allergies and Intolerances:   No Known Allergies    Family History:   Family History   Problem Relation Age of Onset   [address] [facility] Stroke Mother     Hypertension Mother     Seizures Mother     Diabetes Father     Heart Disease Father     Hypertension Father     Other Maternal Grandmother         tumor in abdomen       Social History:   He  reports that he has never smoked. He has never used smokeless tobacco.  He  reports that he does not drink alcohol. Objective:   Physical exam:   Visit Vitals  /74 (BP 1 Location: Left arm, BP Patient Position: Sitting)   Pulse (!) 53   Temp 98.6 °F (37 °C) (Oral)   Resp 17   Ht 5' 6\" (1.676 m)   Wt 222 lb 12.8 oz (101.1 kg)   SpO2 95%   BMI 35.96 kg/m²        Generally: Pleasant male in no acute distress  Cardiac Exam: regular, rate, and rhythm. Normal S1 and S2. No murmurs, gallops, or rubs  Pulmonary exam: Clear to auscultation bilaterally  Abdominal exam: Positive bowel sounds in all four quadrants, soft, nondistended, nontender  Extremities: 2+ dorsalis pedis pulses bilaterally.  No pedal edema    bilaterally    LABS/Radiological Tests:  Lab Results   Component Value Date/Time    WBC 4.4 (L) 02/18/2019 09:40 AM    HGB 16.8 (H) 02/18/2019 09:40 AM    HCT 51.0 (H) 02/18/2019 09:40 AM    PLATELET 601 75/25/9477 09:40 AM     Lab Results   Component Value Date/Time    Sodium 138 02/18/2019 09:40 AM    Potassium 3.9 02/18/2019 09:40 AM    Chloride 103 02/18/2019 09:40 AM    CO2 27 02/18/2019 09:40 AM    Glucose 115 (H) 02/18/2019 09:40 AM    BUN 19 (H) 02/18/2019 09:40 AM    Creatinine 1.15 02/18/2019 09:40 AM     Lab Results   Component Value Date/Time    Cholesterol, total 127 02/18/2019 09:40 AM    HDL Cholesterol 44 02/18/2019 09:40 AM    LDL, calculated 69.6 02/18/2019 09:40 AM    Triglyceride 67 02/18/2019 09:40 AM     No results found for: GPT  Component      Latest Ref Rng & Units 2/18/2019           9:40 AM   Hemoglobin A1c, (calculated)      4.2 - 5.6 % 7.1 (H)   Est. average glucose      mg/dL 157     Previous labs      ASSESSMENT/PLAN:    1. Diabetes mellitus, stable (HCC):we will see what the labs show. Continue diet, exercise and glipizide.  -     HEMOGLOBIN A1C WITH EAG; Future    2. Dyslipidemia:we will see what the labs show. Continue lipitor, diet and exercise. -     METABOLIC PANEL, COMPREHENSIVE; Future  -     LIPID PANEL; Future    3. Benign hypertension without CHF:stable. Continue diet, exercise and lopressor and lotrel. 4. Patient verbalized understanding and agreement with the plan. 5. Patient was given an after-visit summary. 6.   Follow-up and Dispositions    · Return in about 3 months (around 8/20/2019) for f/u DM/HTN/lipids or sooner if worsening symptoms.                       Jens Allen MD

## 2019-05-20 NOTE — PROGRESS NOTES
ROOM # 1  Identified pt with two pt identifiers(name and ). Reviewed record in preparation for visit and have obtained necessary documentation. Chief Complaint   Patient presents with    Diabetes    Hypertension    Cholesterol Problem     3 month fu       Sussy Evans preferred language for health care discussion is english/other. Is the patient using any DME equipment during OV? YES/ cane     Sussy Evans is due for:  Health Maintenance Due   Topic    Shingrix Vaccine Age 50> (1 of 2)    FOOT EXAM Q1     EYE EXAM RETINAL OR DILATED      Health Maintenance reviewed and discussed per provider  Please order/place referral if appropriate. Advance Directive:  1. Do you have an advance directive in place? Patient Reply: NO    2. If not, would you like material regarding how to put one in place? NO    Coordination of Care:  1. Have you been to the ER, urgent care clinic since your last visit? Hospitalized since your last visit? NO    2. Have you seen or consulted any other health care providers outside of the [address] [place] [address] since your last visit? Include any pap smears or colon screening. NO    Patient is accompanied by self I have received verbal consent from Sussy Evans to discuss any/all medical information while they are present in the room.     Learning Assessment:  Learning Assessment 2013   PRIMARY LEARNER Patient Patient Patient   HIGHEST LEVEL OF EDUCATION - PRIMARY LEARNER  - DID NOT GRADUATE HIGH SCHOOL DID NOT GRADUATE HIGH SCHOOL   BARRIERS PRIMARY LEARNER NONE NONE NONE   PRIMARY LANGUAGE ENGLISH ENGLISH ENGLISH    NEED - - No   LEARNER PREFERENCE PRIMARY DEMONSTRATION DEMONSTRATION DEMONSTRATION   LEARNING SPECIAL TOPICS - - no   ANSWERED BY self patient patient   RELATIONSHIP SELF SELF SELF     Depression Screening:  3 most recent [facility] [place] Screens 2018 2018 2018 10/24/2017 2017 2017 2016   Little interest or pleasure in doing things Not at all Not at all Not at all Not at all Not at all Not at all Not at all   Feeling down, depressed, irritable, or hopeless Not at all Not at all Not at all Not at all Not at all Not at all Not at all   Total Score PHQ 2 0 0 0 0 0 0 0     Abuse Screening:  Abuse Screening Questionnaire 10/24/2017   Do you ever feel afraid of your partner? N   Are you in a relationship with someone who physically or mentally threatens you? N   Is it safe for you to go home?  Y     Fall Risk  n/i

## 2019-09-19 ENCOUNTER — HOSPITAL ENCOUNTER (OUTPATIENT)
Dept: LAB | Age: 59
Discharge: HOME | End: 2019-09-19
Payer: MEDICARE

## 2019-09-19 ENCOUNTER — OFFICE VISIT (OUTPATIENT)
Dept: INTERNAL MEDICINE CLINIC | Age: 59
End: 2019-09-19

## 2019-09-19 VITALS
HEIGHT: 66 IN | TEMPERATURE: 97.9 F | BODY MASS INDEX: 36.64 KG/M2 | DIASTOLIC BLOOD PRESSURE: 70 MMHG | WEIGHT: 228 LBS | HEART RATE: 60 BPM | SYSTOLIC BLOOD PRESSURE: 130 MMHG | RESPIRATION RATE: 18 BRPM | OXYGEN SATURATION: 98 %

## 2019-09-19 DIAGNOSIS — I10 BENIGN HYPERTENSION WITHOUT CHF: Primary | ICD-10-CM

## 2019-09-19 DIAGNOSIS — E78.5 DYSLIPIDEMIA: ICD-10-CM

## 2019-09-19 DIAGNOSIS — Z12.5 SCREENING PSA (PROSTATE SPECIFIC ANTIGEN): ICD-10-CM

## 2019-09-19 DIAGNOSIS — E11.9 DIABETES MELLITUS, STABLE (HCC): ICD-10-CM

## 2019-09-19 LAB
ALBUMIN SERPL-MCNC: 3.8 G/DL (ref 3.4–5)
ALBUMIN/GLOB SERPL: 1.2 {RATIO} (ref 0.8–1.7)
ALP SERPL-CCNC: 89 U/L (ref 45–117)
ALT SERPL-CCNC: 46 U/L (ref 16–61)
ANION GAP SERPL CALC-SCNC: 7 MMOL/L (ref 3–18)
AST SERPL-CCNC: 20 U/L (ref 10–38)
BILIRUB SERPL-MCNC: 0.5 MG/DL (ref 0.2–1)
BUN SERPL-MCNC: 17 MG/DL (ref 7–18)
BUN/CREAT SERPL: 16 (ref 12–20)
CALCIUM SERPL-MCNC: 9.6 MG/DL (ref 8.5–10.1)
CHLORIDE SERPL-SCNC: 102 MMOL/L (ref 100–111)
CO2 SERPL-SCNC: 29 MMOL/L (ref 21–32)
CREAT SERPL-MCNC: 1.04 MG/DL (ref 0.6–1.3)
EST. AVERAGE GLUCOSE BLD GHB EST-MCNC: 134 MG/DL
GLOBULIN SER CALC-MCNC: 3.3 G/DL (ref 2–4)
GLUCOSE SERPL-MCNC: 75 MG/DL (ref 74–99)
HBA1C MFR BLD: 6.3 % (ref 4.2–5.6)
POTASSIUM SERPL-SCNC: 3.9 MMOL/L (ref 3.5–5.5)
PROT SERPL-MCNC: 7.1 G/DL (ref 6.4–8.2)
SODIUM SERPL-SCNC: 138 MMOL/L (ref 136–145)

## 2019-09-19 PROCEDURE — 80053 COMPREHEN METABOLIC PANEL: CPT

## 2019-09-19 PROCEDURE — 84153 ASSAY OF PSA TOTAL: CPT

## 2019-09-19 PROCEDURE — 36415 COLL VENOUS BLD VENIPUNCTURE: CPT

## 2019-09-19 PROCEDURE — 83036 HEMOGLOBIN GLYCOSYLATED A1C: CPT

## 2019-09-19 PROCEDURE — 80061 LIPID PANEL: CPT

## 2019-09-19 NOTE — PROGRESS NOTES
Rm: 2      Chief Complaint   Patient presents with    Diabetes    Hypertension    Cholesterol Problem     Depression Screening:  3 most recent [facility] [place] Screens 9/19/2019 11/2/2018 5/22/2018 2/22/2018 10/24/2017 7/24/2017 4/24/2017   Little interest or pleasure in doing things Not at all Not at all Not at all Not at all Not at all Not at all Not at all   Feeling down, depressed, irritable, or hopeless Not at all Not at all Not at all Not at all Not at all Not at all Not at all   Total Score PHQ 2 0 0 0 0 0 0 0       Learning Assessment:  Learning Assessment 2/14/2018 1/14/2015 12/20/2013   PRIMARY LEARNER Patient Patient Patient   HIGHEST LEVEL OF EDUCATION - PRIMARY LEARNER  - DID NOT GRADUATE HIGH SCHOOL DID NOT GRADUATE [address]    NEED - - No   LEARNER PREFERENCE PRIMARY DEMONSTRATION 2021 [facility] - - no   ANSWERED BY self patient patient   RELATIONSHIP SELF SELF SELF       Abuse Screening:  Abuse Screening Questionnaire 10/24/2017   Do you ever feel afraid of your partner? N   Are you in a relationship with someone who physically or mentally threatens you? N   Is it safe for you to go home? Y       Health Maintenance reviewed and discussed per provider: yes     Coordination of Care:    1. Have you been to the ER, urgent care clinic since your last visit? Hospitalized since your last visit? no    2. Have you seen or consulted any other health care providers outside of the [address] [place] [address] since your last visit? Include any pap smears or colon screening.  no

## 2019-09-19 NOTE — PROGRESS NOTES
Chief Complaint   Patient presents with    Diabetes    Hypertension    Cholesterol Problem       HPI:     Sarah Brown is a 60 y.o.  male with history of type 2 DM, hypertension and dyslipidemia  here for the above complaint. He denies any chest pain, shortness of breath, abdominal pain, headaches or dizziness. His sugars are running in the 120's. He is not taking the percocet anymore. Past Medical History:   Diagnosis Date    Advance directive discussed with patient 04/24/2017    Back injury 4/27/2007    Behavioral change     Chronic pain     CKD (chronic kidney disease) stage 2, GFR 60-89 ml/min 12/20/2013    DDD (degenerative disc disease)     Depression     Diabetes mellitus, stable ([facility] [address]     Diabetic eye exam ([facility] [address] 09/09/2016    Dr. Donavon Fleischer ( bilateral cataracts)    Difficulty walking     DJD (degenerative joint disease)     Generalized stiffness     H/O colonoscopy with polypectomy 01/22/2019    Diminutive, 2 mm, polyp in the ascending colon, biopsied. Dr. Giselle Lui (rectum biopsy--hyperplastic polyp)    High cholesterol     Hypertension     Insomnia     Lower extremity edema     Lymphedema     Osteoarthritis     Other and unspecified hyperlipidemia     Type II or unspecified type diabetes mellitus without mention of complication, not stated as uncontrolled      Past Surgical History:   Procedure Laterality Date    COLONOSCOPY N/A 1/22/2019    COLONOSCOPY performed by Tamika Null MD at [facility] ENDOSCOPY    HX COLONOSCOPY      HX ORTHOPAEDIC      left elbow surgery    HX ORTHOPAEDIC Left 1/19/16    left Total Knee replacement    HX OTHER SURGICAL  02/2018    removal of abscess in back   [facility] N/A [id number]    Dr. Ulises Arzola     Current Outpatient Medications   Medication Sig    acetaminophen (TYLENOL) 500 mg tablet Take  by mouth every six (6) hours as needed for Pain.     glipiZIDE (GLUCOTROL) 5 mg tablet Take  by mouth two (2) times a day.  cholecalciferol, vitamin D3, (VITAMIN D3 PO) Take  by mouth.  naloxone (NARCAN) 4 mg/actuation nasal spray Use 1 spray intranasally into 1 nostril. Use a new Narcan nasal spray for subsequent doses and administer into alternating nostrils.  glucose blood VI test strips (ASCENSIA AUTODISC VI, ONE TOUCH ULTRA TEST VI) strip Check fasting sugars daily before breakfast .Use 1 strip to test blood sugar daily before breakfast. ICD-10. E11.9    lancets misc Check fasting sugars daily before breakfast.  Use 1 strip to test blood sugar daily before breakfast. One touch meter. ICD-10. E11.9    Blood-Glucose Meter monitoring kit by Does Not Apply route.  diclofenac (VOLTAREN) 1 % gel Apply  to affected area four (4) times daily.  amLODIPine-benazepril (LOTREL) 5-20 mg per capsule TAKE ONE CAPSULE BY MOUTH ONCE DAILY    atorvastatin (LIPITOR) 10 mg tablet TAKE 1 TABLET BY MOUTH DAILY    metoprolol tartrate (LOPRESSOR) 50 mg tablet TAKE 1 TABLET BY MOUTH TWICE A DAY    gabapentin (NEURONTIN) 300 mg capsule TAKE 2 CAPSULES BY MOUTH EVERY MORNING AND 2 CAPSULES EVERY EVENING    furosemide (LASIX) 20 mg tablet Take one po daily prn swelling    Aspirin, Buffered 81 mg tab Take 81 mg by mouth daily. No current facility-administered medications for this visit.       Health Maintenance   Topic Date Due    Shingrix Vaccine Age 49> (1 of 2) 12/17/2009    FOOT EXAM Q1  01/25/2018    EYE EXAM RETINAL OR DILATED  09/09/2018    Pneumococcal 0-64 years (2 of 3 - PCV13) 10/24/2018    Influenza Age 9 to Adult  08/01/2019    MEDICARE YEARLY EXAM  08/23/2019    HEMOGLOBIN A1C Q6M  11/20/2019    MICROALBUMIN Q1  02/18/2020    LIPID PANEL Q1  05/20/2020    COLONOSCOPY  01/22/2024    DTaP/Tdap/Td series (2 - Td) 04/24/2027    Hepatitis C Screening  Completed     Immunization History   Administered Date(s) Administered    Influenza Vaccine 02/06/2014, 09/09/2014    Influenza Vaccine (Quad) PF 11/23/2015, 01/23/2017, 10/24/2017, 11/15/2018    Pneumococcal Polysaccharide (PPSV-23) 10/24/2017     No LMP for male patient. Allergies and Intolerances:   No Known Allergies    Family History:   Family History   Problem Relation Age of Onset   [address] [facility] Stroke Mother     Hypertension Mother     Seizures Mother     Diabetes Father     Heart Disease Father     Hypertension Father     Other Maternal Grandmother         tumor in abdomen       Social History:   He  reports that he has never smoked. He has never used smokeless tobacco.  He  reports that he does not drink alcohol. Objective:   Physical exam:   Visit Vitals  /70 (BP 1 Location: Right arm, BP Patient Position: Sitting)   Pulse 60 Comment: left radial pulse   Temp 97.9 °F (36.6 °C) (Oral)   Resp 18   Ht 5' 6\" (1.676 m)   Wt 228 lb (103.4 kg)   SpO2 98%   BMI 36.80 kg/m²        Generally: Pleasant male in no acute distress  Cardiac Exam: regular, rate, and rhythm. Normal S1 and S2. No murmurs, gallops, or rubs  Pulmonary exam: Clear to auscultation bilaterally  Abdominal exam: Positive bowel sounds in all four quadrants, soft, nondistended, nontender  Extremities: 2+ dorsalis pedis pulses bilaterally.  No pedal edema    bilaterally    LABS/Radiological Tests:  Component      Latest Ref Rng & Units 5/20/2019 5/20/2019 5/20/2019 2/18/2019          10:10 AM 10:10 AM 10:10 AM  9:40 AM   Sodium      136 - 145 mmol/L   140    Potassium      3.5 - 5.5 mmol/L   4.2    Chloride      100 - 108 mmol/L   100    CO2      21 - 32 mmol/L   33 (H)    Anion gap      3.0 - 18 mmol/L   7    Glucose      74 - 99 mg/dL   68 (L)    BUN      7.0 - 18 MG/DL   17    Creatinine      0.6 - 1.3 MG/DL   1.19    BUN/Creatinine ratio      12 - 20     14    GFR est AA      >60 ml/min/1.73m2   >60    GFR est non-AA      >60 ml/min/1.73m2   >60    Calcium      8.5 - 10.1 MG/DL   9.0    Bilirubin, total      0.2 - 1.0 MG/DL   0.7    ALT (SGPT)      16 - 61 U/L 36    AST      15 - 37 U/L   18    Alk. phosphatase      45 - 117 U/L   108    Protein, total      6.4 - 8.2 g/dL   7.3    Albumin      3.4 - 5.0 g/dL   4.0    Globulin      2.0 - 4.0 g/dL   3.3    A-G Ratio      0.8 - 1.7     1.2    Color             Appearance             Specific gravity      1.005 - 1.030         pH (UA)      5.0 - 8.0         Protein      NEG mg/dL       Glucose      NEG mg/dL       Ketone      NEG mg/dL       Bilirubin      NEG         Blood      NEG         Urobilinogen      0.2 - 1.0 EU/dL       Nitrites      NEG         Leukocyte Esterase      NEG         WBC      4.6 - 13.2 K/uL       RBC      4.70 - 5.50 M/uL       HGB      13.0 - 16.0 g/dL       HCT      36.0 - 48.0 %       MCV      74.0 - 97.0 FL       MCH      24.0 - 34.0 PG       MCHC      31.0 - 37.0 g/dL       RDW      11.6 - 14.5 %       PLATELET      932 - 716 K/uL       MPV      9.2 - 11.8 FL       Cholesterol, total      <200 MG/DL  144     Triglyceride      <150 MG/DL  118     HDL Cholesterol      40 - 60 MG/DL  41     LDL, calculated      0 - 100 MG/DL  79.4     VLDL, calculated      MG/DL  23.6     CHOL/HDL Ratio      0 - 5.0    3.5     Microalbumin,urine random      0 - 3.0 MG/DL    5.55 (H)   Creatinine, urine      30 - 125 mg/dL    386.00 (H)   Microalbumin/Creat.  Ratio      0 - 30 mg/g    14   Hemoglobin A1c, (calculated)      4.2 - 5.6 % 6.8 (H)      Est. average glucose      mg/dL 148      TSH      0.36 - 3.74 uIU/mL         Component      Latest Ref Rng & Units 2/18/2019 2/18/2019 2/18/2019 2/18/2019           9:40 AM  9:40 AM  9:40 AM  9:40 AM   Sodium      136 - 145 mmol/L  138     Potassium      3.5 - 5.5 mmol/L  3.9     Chloride      100 - 108 mmol/L  103     CO2      21 - 32 mmol/L  27     Anion gap      3.0 - 18 mmol/L  8     Glucose      74 - 99 mg/dL  115 (H)     BUN      7.0 - 18 MG/DL  19 (H)     Creatinine      0.6 - 1.3 MG/DL  1.15     BUN/Creatinine ratio      12 - 20    17     GFR est AA      >60 ml/min/1.73m2  >60     GFR est non-AA      >60 ml/min/1.73m2  >60     Calcium      8.5 - 10.1 MG/DL  8.9     Bilirubin, total      0.2 - 1.0 MG/DL  0.5     ALT (SGPT)      16 - 61 U/L  36     AST      15 - 37 U/L  18     Alk. phosphatase      45 - 117 U/L  90     Protein, total      6.4 - 8.2 g/dL  6.9     Albumin      3.4 - 5.0 g/dL  4.0     Globulin      2.0 - 4.0 g/dL  2.9     A-G Ratio      0.8 - 1.7    1.4     Color             Appearance             Specific gravity      1.005 - 1.030         pH (UA)      5.0 - 8.0         Protein      NEG mg/dL       Glucose      NEG mg/dL       Ketone      NEG mg/dL       Bilirubin      NEG         Blood      NEG         Urobilinogen      0.2 - 1.0 EU/dL       Nitrites      NEG         Leukocyte Esterase      NEG         WBC      4.6 - 13.2 K/uL       RBC      4.70 - 5.50 M/uL       HGB      13.0 - 16.0 g/dL       HCT      36.0 - 48.0 %       MCV      74.0 - 97.0 FL       MCH      24.0 - 34.0 PG       MCHC      31.0 - 37.0 g/dL       RDW      11.6 - 14.5 %       PLATELET      742 - 335 K/uL       MPV      9.2 - 11.8 FL       Cholesterol, total      <200 MG/      Triglyceride      <150 MG/DL 67      HDL Cholesterol      40 - 60 MG/DL 44      LDL, calculated      0 - 100 MG/DL 69.6      VLDL, calculated      MG/DL 13.4      CHOL/HDL Ratio      0 - 5.0   2.9      Microalbumin,urine random      0 - 3.0 MG/DL       Creatinine, urine      30 - 125 mg/dL       Microalbumin/Creat.  Ratio      0 - 30 mg/g       Hemoglobin A1c, (calculated)      4.2 - 5.6 %    7.1 (H)   Est. average glucose      mg/dL    157   TSH      0.36 - 3.74 uIU/mL   0.94      Component      Latest Ref Rng & Units 2/18/2019 2/18/2019           9:40 AM  9:40 AM   Sodium      136 - 145 mmol/L     Potassium      3.5 - 5.5 mmol/L     Chloride      100 - 108 mmol/L     CO2      21 - 32 mmol/L     Anion gap      3.0 - 18 mmol/L     Glucose      74 - 99 mg/dL     BUN      7.0 - 18 MG/DL     Creatinine      0.6 - 1.3 MG/DL     BUN/Creatinine ratio      12 - 20       GFR est AA      >60 ml/min/1.73m2     GFR est non-AA      >60 ml/min/1.73m2     Calcium      8.5 - 10.1 MG/DL     Bilirubin, total      0.2 - 1.0 MG/DL     ALT (SGPT)      16 - 61 U/L     AST      15 - 37 U/L     Alk. phosphatase      45 - 117 U/L     Protein, total      6.4 - 8.2 g/dL     Albumin      3.4 - 5.0 g/dL     Globulin      2.0 - 4.0 g/dL     A-G Ratio      0.8 - 1.7       Color       DARK YELLOW    Appearance       CLEAR    Specific gravity      1.005 - 1.030   1.030    pH (UA)      5.0 - 8.0   5.5    Protein      NEG mg/dL NEGATIVE    Glucose      NEG mg/dL NEGATIVE    Ketone      NEG mg/dL NEGATIVE    Bilirubin      NEG   NEGATIVE    Blood      NEG   NEGATIVE    Urobilinogen      0.2 - 1.0 EU/dL 0.2    Nitrites      NEG   NEGATIVE    Leukocyte Esterase      NEG   NEGATIVE    WBC      4.6 - 13.2 K/uL  4.4 (L)   RBC      4.70 - 5.50 M/uL  6.42 (H)   HGB      13.0 - 16.0 g/dL  16.8 (H)   HCT      36.0 - 48.0 %  51.0 (H)   MCV      74.0 - 97.0 FL  79.4   MCH      24.0 - 34.0 PG  26.2   MCHC      31.0 - 37.0 g/dL  32.9   RDW      11.6 - 14.5 %  13.1   PLATELET      931 - 554 K/uL  210   MPV      9.2 - 11.8 FL  11.0   Cholesterol, total      <200 MG/DL     Triglyceride      <150 MG/DL     HDL Cholesterol      40 - 60 MG/DL     LDL, calculated      0 - 100 MG/DL     VLDL, calculated      MG/DL     CHOL/HDL Ratio      0 - 5.0       Microalbumin,urine random      0 - 3.0 MG/DL     Creatinine, urine      30 - 125 mg/dL     Microalbumin/Creat. Ratio      0 - 30 mg/g     Hemoglobin A1c, (calculated)      4.2 - 5.6 %     Est. average glucose      mg/dL     TSH      0.36 - 3.74 uIU/mL       Previous labs      ASSESSMENT/PLAN:    1. Benign hypertension without CHF:stable. Continue the lotrel, lopressor and diet and exercise. 2. Dyslipidemia:we will see what the labs show. Continue lipitor, diet and exercise. -     METABOLIC PANEL, COMPREHENSIVE;  Future  - LIPID PANEL; Future    3. Diabetes mellitus, stable (HCC):we will see what the labs show. Continue diet, exercise and glipizide.  -     HEMOGLOBIN A1C WITH EAG; Future    4. Screening PSA (prostate specific antigen)  -     PSA SCREENING (SCREENING); Future      5. Patient verbalized understanding and agreement with the plan. 6. Patient was given an after-visit summary. 7.   Follow-up and Dispositions    Return in about 3 months (around 12/19/2019) for f/u DM/HTN/lipids, medicare wellness subsequent exam.  or sooner if worsening symptoms.   ·                    Clari Greenebrg MD

## 2019-09-19 NOTE — LETTER
Name:.Armando Juarez :1960 MR #:[id number] Office:[facility] Page 1 of 5 CONTROLLED SUBSTANCE AGREEMENT I may be prescribed medications that are controlled substances as part  of my treatment plan for management of my medical condition(s). The goal of my treatment plan is to maintain and/or improve my health and wellbeing. Because controlled substances have an increased risk of abuse or harm, continual re-evaluation is needed determine if the goals of my treatment plan are being met for my safety and the safety of others. Montey Dubin  am entering into this Controlled Substance Agreement with my provider, Dr. Rodríguez__________________________________ at [address] . I understand that successful treatment requires mutual trust and honesty between me and my provider. I understand that there are state and federal laws and regulations which apply to the medications that my provider may prescribe that must be followed. I understand there are risks and benefits ts of taking the medicines that my provider may prescribe. I understand and agree that following this Agreement is necessary in continuing my provider-patient relationship and success of my treatment plan. As a part of my treatment plan, I agree to the following: COMMUNICATION: 
 
1. I will communicate fully with my provider about my medical condition(s), including the effect on my daily life and how well my medications are helping. I will tell my provider all of the medications that I take for any reason, including medications I receive from another health care provider, and will notify my provider about all issues, problems or concerns, including any side effects, which may be related to my medications. I understand that this information allows my provider to adjust my treatment plan to help manage my medical condition.  I understand that this information will become part of my permanent medical record. 2. I will notify my provider if I have a history of alcohol/drug misuse/addiction or if I have had treatment for alcohol/drug addiction in the past, or if I have a new problem with or concern about alcohol/drug use/addiction, because this increases the likelihood of high risk behaviors and may lead to serious medical conditions. 3. Females Only: I will notify my provider if I am or become pregnant, or if I intend to become pregnant, or if I intend to breastfeed. I understand that communication of these issues with my provider is important, due to possible effects my medication could have on an unborn fetus or breastfeeding child. Initials_____ Name:Isaiah Barraza :1960 MR #:[id number] Office:[facility] Page 2 of 5 MISUSE OF MEDICATIONS / DRUGS: 
 
1. I agree to take all controlled substances as prescribed, and will not misuse or abuse any controlled substances prescribed by my provider. For my safety, I will not increase the amount of medicine I take without first talking with and getting permission from my provider. 2. If I have a medical emergency, another health care provider may prescribe me medication. If I seek emergency treatment, I will notify my provider within seventy-two (72) hours. 3. I understand that my provider may discuss my use and/or possible misuse/abuse of controlled substances and alcohol, as appropriate, with any health care provider involved in my care, pharmacist or legal authority. ILLEGAL DRUGS: 
 
1. I will not use illegal drugs of any kind, including but not limited to marijuana, heroin, cocaine, or any prescription drug which is not prescribed to me. DRUG DIVERSION / PRESCRIPTION FRAUD: 
 
1. I will not share, sell, trade, give away, or otherwise misuse my prescriptions or medications. 2. I will not alter any prescriptions provided to me by my provider. SINGLE PROVIDER: 
 
1. I agree that all controlled substances that I take will be prescribed only by my provider (or his/her covering provider) under this Agreement. This agreement does not prevent me from seeking emergency medical treatment or receiving pain management related to a surgery. PROTECTING MEDICATIONS: 
 
1. I am responsible for keeping my prescriptions and medications in a safe and secure place including safeguarding them from loss or theft. I understand that lost, stolen or damaged/destroyed prescriptions or medications will not be replaced. Initials____ Name:Isaiah Escobar Linejuno :1960 MR #:[id number] Office:[facility] Page 3 of 5 PRESCRIPTION RENEWALS/REFILLS: 
 
1. I will follow my controlled substance medication schedule as prescribed by my provider. 2. I understand and agree that I will make any requests for renewals or refills of my prescriptions only at the time of an office visit or during my providers regular office hours subject to the prescription refill requirements of the individual practice. 3. I understand that my provider may not call in prescriptions for controlled substances to my pharmacy. 4. I understand that my provider may adjust or discontinue these medications as deemed appropriate for my medical treatment plan. This Agreement does not guarantee the prescription of controlled medications. 5. I agree that if my medications are adjusted or discontinued, I will properly dispose of any remaining medications. I understand that I will be required to dispose of any remaining controlled medications prior to being provided with any prescriptions for other controlled medications.  
 
6. I understand that the renewal of my prescription depends on my medical condition, my consistent participation, and my adherence with my treatment plan and this Agreement. 7. I understand that if I do not keep an appointment with my provider, I may not receive a renewal or refill for my controlled substance medication. PRESCRIPTION MONITORING / DRUG TESTIN. I understand that my provider may require me to provide urine, saliva or blood for testing at any time. I understand that this testing will be used to monitor for safety and adherence with my treatment plan and this Agreement. 2. I understand that my provider may ask me to provide an observed urine specimen, which means that a nurse or other health care provider may watch me provide urine, and I agree to cooperate if I am asked to provide an observed specimen. 3. I understand that if I do not provide urine, saliva or blood samples within two (2) hours of my providers request, or other timeframe decided by my provider, my treatment plan could be changed, or my prescriptions and medications may be changed or ended. 4. I understand that urine, saliva and blood test results will be a part of my permanent medical record. Initials_____ Name:Isaiah Vieyra :1960 MR #:[id number] Office:[facility] Page 4 of 5 
 
5. I understand that my provider is required to obtain a copy of my State Prescription Monitoring Program () Report at any time in order to safely prescribe medications. 6. I will bring all of my prescribed controlled substance medications in their original bottles to all of my scheduled appointments. 7. I understand that my provider may ask me to come to the practice with all of my prescribed medications for a random pill count at any time. I agree to cooperate if I am asked to come in for a random pill count. I understand that if I do not arrive in the timeframe decided by my provider, my treatment plan could be changed, or my prescriptions and medications may be changed or ended.  
 
COOPERATION WITH INVESTIGATIONS: 
 
 1. I authorize my provider and my pharmacy to cooperate fully with any local, state, or federal law enforcement agency in the investigation of any possible misuse, sale, or other diversion of my controlled substance prescriptions or medications. RISKS: 
 
1. I understand that my level of consciousness may be affected from the use of controlled substances, and I understand that there are risks, benefits, effects and potential alternatives (including no treatment) to the medications that my provider has prescribed. 2. I understand that I may become drowsy, tired, dizzy, constipated, and sick to my stomach, or have changes in my mood or in my sleep while taking my medications. I have talked with my provider about these possible side effects, risks, benefits, and alternative treatments, and my provider has answered all of my questions. 3. I understand that I should not suddenly stop taking my medications without first speaking with my provider. I understand that if I suddenly stop taking my medications, I may experience nausea, vomiting, sweating,anxiety, sleeplessness, itching or other uncomfortable feelings. 4. I will not take my medications with alcohol of any kind, including beer, wine or liquor. I understand that drinking alcohol with my medications increases the chances of side effects, including breathing problems or even death. 5. I understand that if I have a history of alcoholism or other drug addiction I may be at increased risk of addiction to my medications. Signs of addiction might include craving, compulsive use, and continued use despite harm. Since addiction is a disease, I understand my provider may decide to change my medications and refer me to appropriate treatment services. I understand that this information would become part of my permanent medical record. Initials_____ Name:.Armando Navarro Aida :1960 MR #:[id number] Office:[facility]  
 Page 5 of 5  
 
 
6. Females only: Children born to women who regularly take controlled substances are likely to have physical problems and suffer withdrawal symptoms at birth. If I am of child-bearing age, I understand that I should use safe and effective birth control while taking any controlled substances to avoid the impact of medications on an unborn fetus or  child. I agree to notify my provider immediately if I should become pregnant so that my treatment plan can be adjusted. 7. Males only: I understand that chronic use of controlled substances has been associated with low testosterone levels in males which may affect my mood, stamina, sexual desire, and general health. I understand that my provider may order the appropriate laboratory test to determine my testosterone level,and I agree to this testing. ADHERENCE: 
 
1. I understand that if I do not adhere to this Agreement in any way, my provider may change my prescriptions, stop prescribing controlled substances or end our provider-patient relationship. 2. If my provider decides to stop prescribing medication, or decides to end our provider-patient relationship,my provider may require that I taper my medications slowly. If necessary, my provider may also provide a prescription for other medications to treat my withdrawal symptoms. UNDERSTANDING THIS AGREEMENT: 
 
I understand that my provider may adjust or stop my prescriptions for controlled substances based on my medical condition and my treatment plan. I understand that this Agreement does not guarantee that I will be prescribed medications or controlled substances. I understand that controlled substances may be just one part 
of my treatment plan. My initial on each page and my signature below shows that I have read each page of this Agreement, I have had an opportunity to ask questions, and all of my questions have been answered to my satisfaction by my provider. By signing below, I agree to comply with this Agreement, and I understand that if I do not follow the Agreements listed above, my provider may stop 
 
_________________________________________  Date/Time 9/19/2019 10:12 AM   
             (Patient Signature) 
 
________________________________________    Date/Time 9/19/2019 10:12 AM 
 (Parent or Guardian Signature if <18 yrs) 
 
_________________________________________ Date/Time 9/19/2019 10:12 AM 
 (Provider Signature) Preferred pharmacy:

## 2019-09-20 LAB
CHOLEST SERPL-MCNC: 178 MG/DL
HDLC SERPL-MCNC: 41 MG/DL (ref 40–60)
HDLC SERPL: 4.3 {RATIO} (ref 0–5)
LDLC SERPL CALC-MCNC: 121.4 MG/DL (ref 0–100)
LIPID PROFILE,FLP: ABNORMAL
PSA SERPL-MCNC: 1.2 NG/ML (ref 0–4)
TRIGL SERPL-MCNC: 78 MG/DL (ref ?–150)
VLDLC SERPL CALC-MCNC: 15.6 MG/DL

## 2020-12-25 ENCOUNTER — HOSPITAL ENCOUNTER (EMERGENCY)
Age: 60
Discharge: HOME | End: 2020-12-25
Attending: EMERGENCY MEDICINE | Admitting: EMERGENCY MEDICINE
Payer: MEDICARE

## 2020-12-25 VITALS
HEIGHT: 66 IN | OXYGEN SATURATION: 100 % | HEART RATE: 70 BPM | SYSTOLIC BLOOD PRESSURE: 162 MMHG | WEIGHT: 220 LBS | TEMPERATURE: 97.8 F | BODY MASS INDEX: 35.36 KG/M2 | RESPIRATION RATE: 16 BRPM | DIASTOLIC BLOOD PRESSURE: 102 MMHG

## 2020-12-25 DIAGNOSIS — L02.212 ABSCESS OF UPPER BACK EXCLUDING SCAPULAR REGION: Primary | ICD-10-CM

## 2020-12-25 PROCEDURE — 99282 EMERGENCY DEPT VISIT SF MDM: CPT

## 2020-12-25 PROCEDURE — 75810000289 HC I&D ABSCESS SIMP/COMP/MULT

## 2020-12-25 RX ORDER — SULFAMETHOXAZOLE AND TRIMETHOPRIM 800; 160 MG/1; MG/1
2 TABLET ORAL 2 TIMES DAILY
Qty: 40 TAB | Refills: 0 | Status: SHIPPED | OUTPATIENT
Start: 2020-12-25 | End: 2021-01-04

## 2020-12-25 RX ORDER — CEPHALEXIN 500 MG/1
500 CAPSULE ORAL 4 TIMES DAILY
Qty: 40 CAP | Refills: 0 | Status: SHIPPED | OUTPATIENT
Start: 2020-12-25 | End: 2021-01-04

## 2020-12-25 NOTE — ED PROVIDER NOTES
[facility] EMERGENCY DEPT      8:59 AM    Date: 12/25/2020  Patient Name: Rocco Tamez    History of Presenting Illness     Chief Complaint   Patient presents with    Skin Problem       63 y.o. male with noted past medical history who presents to the emergency department with upper back abscess. The patient states he was in usual state of health until couple weeks ago and started having some focal right upper back pain and swelling that is progressed to the present. The [facility] but that did not perform an I&D on the abscess. Since then has gotten somewhat larger and more painful. Patient denies any fever or chills. The patient denies recent travel outside [facility] and denies recent travel to areas large social gatherings. The patient denies any known history of Covid 19 exposure. Patient denies any other associated signs or symptoms. Patient denies any other complaints. Nursing notes regarding the HPI and triage nursing notes were reviewed. Prior medical records were reviewed. Current Outpatient Medications   Medication Sig Dispense Refill    acetaminophen (TYLENOL) 500 mg tablet Take  by mouth every six (6) hours as needed for Pain.  glipiZIDE (GLUCOTROL) 5 mg tablet Take  by mouth two (2) times a day.  cholecalciferol, vitamin D3, (VITAMIN D3 PO) Take  by mouth.  naloxone (NARCAN) 4 mg/actuation nasal spray Use 1 spray intranasally into 1 nostril. Use a new Narcan nasal spray for subsequent doses and administer into alternating nostrils. 1 Each 1    glucose blood VI test strips (ASCENSIA AUTODISC VI, ONE TOUCH ULTRA TEST VI) strip Check fasting sugars daily before breakfast .Use 1 strip to test blood sugar daily before breakfast. ICD-10. E11.9 100 Strip 11    lancets misc Check fasting sugars daily before breakfast.  Use 1 strip to test blood sugar daily before breakfast. One touch meter. ICD-10. E11.9 100 Each 11    Blood-Glucose Meter monitoring kit by Does Not Apply route.  diclofenac (VOLTAREN) 1 % gel Apply  to affected area four (4) times daily.  amLODIPine-benazepril (LOTREL) 5-20 mg per capsule TAKE ONE CAPSULE BY MOUTH ONCE DAILY 90 Cap 3    atorvastatin (LIPITOR) 10 mg tablet TAKE 1 TABLET BY MOUTH DAILY 90 Tab 3    metoprolol tartrate (LOPRESSOR) 50 mg tablet TAKE 1 TABLET BY MOUTH TWICE A  Tab 1    gabapentin (NEURONTIN) 300 mg capsule TAKE 2 CAPSULES BY MOUTH EVERY MORNING AND 2 CAPSULES EVERY EVENING 360 Cap 3    furosemide (LASIX) 20 mg tablet Take one po daily prn swelling 30 Tab 1    Aspirin, Buffered 81 mg tab Take 81 mg by mouth daily. Past History     Past Medical History:  Past Medical History:   Diagnosis Date    Advance directive discussed with patient 04/24/2017    Back injury 4/27/2007    Behavioral change     Chronic pain     CKD (chronic kidney disease) stage 2, GFR 60-89 ml/min 12/20/2013    DDD (degenerative disc disease)     Depression     Diabetes mellitus, stable ([facility] [address]     Diabetic eye exam ([facility] [address] 09/09/2016    Dr. Jackie Watt ( bilateral cataracts)    Difficulty walking     DJD (degenerative joint disease)     Generalized stiffness     H/O colonoscopy with polypectomy 01/22/2019    Diminutive, 2 mm, polyp in the ascending colon, biopsied.   Dr. Khris Gaitan (rectum biopsy--hyperplastic polyp)    High cholesterol     Hypertension     Insomnia     Lower extremity edema     Lymphedema     Osteoarthritis     Other and unspecified hyperlipidemia     Type II or unspecified type diabetes mellitus without mention of complication, not stated as uncontrolled        Past Surgical History:  Past Surgical History:   Procedure Laterality Date    COLONOSCOPY N/A 1/22/2019    COLONOSCOPY performed by Booker Kmuari [facility] at [facility] ENDOSCOPY    HX COLONOSCOPY      HX ORTHOPAEDIC      left elbow surgery    HX ORTHOPAEDIC Left 1/19/16 left Total Knee replacement    HX OTHER SURGICAL  02/2018    removal of abscess in back   [address] N/A 34/77/5721    Dr. Bridget Echeverria       Family History:  Family History   Problem Relation Age of Onset   [facility] Stroke Mother     Hypertension Mother     Seizures Mother     Diabetes Father     Heart Disease Father     Hypertension Father     Other Maternal Grandmother         tumor in abdomen       Social History:  Social History     Tobacco Use    Smoking status: Never Smoker    Smokeless tobacco: Never Used   Substance Use Topics    Alcohol use: No     Alcohol/week: 0.0 standard drinks    Drug use: No       Allergies:  No Known Allergies    Patient's primary care provider (as noted in EPIC):  Rodolfo Mcelroy MD    Review of Systems   Constitutional: Negative for diaphoresis. HENT: Negative for congestion. Eyes: Negative for discharge. Respiratory: Negative for stridor. Cardiovascular: Negative for palpitations. Gastrointestinal: Negative for diarrhea. Endocrine: Negative for heat intolerance. Genitourinary: Negative for flank pain. Musculoskeletal: Negative for back pain. Neurological: Negative for weakness. Psychiatric/Behavioral: Negative for hallucinations. All other systems reviewed and are negative. Visit Vitals  BP (!) 162/102   Pulse 70   Temp 97.8 °F (36.6 °C)   Resp 16   Ht 5' 6\" (1.676 m)   Wt 99.8 kg (220 lb)   SpO2 100%   BMI 35.51 kg/m²       Patient Vitals for the past 12 hrs:   Temp Pulse Resp BP SpO2   12/25/20 0847 97.8 °F (36.6 °C) 70 16 (!) 162/102 100 %       PHYSICAL EXAM:    CONSTITUTIONAL:  Alert, in no apparent distress;  well developed;  well nourished. HEAD:  Normocephalic, atraumatic. EYES:  EOMI. Non-icteric sclera. Normal conjunctiva. ENTM:  Nose:  no rhinorrhea. Throat:  no erythema or exudate, mucous membranes moist.  NECK:  No JVD.   Supple  RESPIRATORY:  Chest clear, equal breath sounds, good air movement. CARDIOVASCULAR:  Regular rate and rhythm. No murmurs, rubs, or gallops. GI:  Normal bowel sounds, abdomen soft and non-tender. No rebound or guarding. BACK:  Non-tender. UPPER EXT:  Normal inspection. LOWER EXT:  No edema, no calf tenderness. Distal pulses intact. NEURO:  Moves all four extremities, and grossly normal motor exam.  SKIN:  No rashes;  Normal for age. PSYCH:  Alert and normal affect. Area of abscess: Right upper parathoracic back has an area of swelling and raised skin and fluctuance consistent with an abscess. No drainage at this time. Differential diagnoses:  Abscess, inflammatory induration, SQ nodule, pilonidal cyst and/or lipoma or a combination of the aforementioned are highest on differential diagnoses. Diagnostic Study Results     Abnormal lab results from this emergency department encounter:  Labs Reviewed - No data to display    Lab values for this patient within approximately the last 12 hours:  No results found for this or any previous visit (from the past 12 hour(s)). Radiologist and cardiologist interpretations if available at time of this note:  No results found. Medication(s) ordered for patient during this emergency visit encounter:  Medications - No data to display    Medical Decision Making     I am the first provider for this patient. I reviewed the vital signs, available nursing notes, past medical history, past surgical history, family history and social history. Vital Signs:  Reviewed the patient's vital signs. ED COURSE:      PROCEDURE NOTE:  INCISION AND DRAINAGE    Incision Site: Right upper parathoracic back   Local anesthetic:  Lidocaine 1%, 3 cc    The area was prepped with Betadine solution. Noted anesthetic was injected locally for local anesthesia. Using a scalpel, the site was opened and discharge as noted was drained from the site, approximately 20 cc.   The abscess area was probed, and if present, loculations were broken up. The abscess was not packed. The patient tolerated the procedure well. ED COURSE AND MEDICAL DECISION MAKING:    The patient does not appear toxic from the abscess, with no signs of sepsis by presentation, physical examination, nor by vital signs. IMPRESSION AND MEDICAL DECISION MAKING:  Based upon the patient's presentation with noted HPI and PE, along with the work up done in the emergency department, I believe that the patient is having noted abscess. Will treat with antibiotics which include coverage for possible MRSA. The patient appears nontoxic at time of discharge. DIAGNOSIS:  1. Abscess, status post incision and drainage in emergency department. SPECIFIC PATIENT INSTRUCTIONS FROM THE PHYSICIAN WHO TREATED YOU IN THE ER TODAY:  1. Return if any concerns or worsening of condition(s)  2. Keflex and Bactrim DS as prescribed until finished. Patient is improved, resting quietly and comfortably. The patient will be discharged home. The patient was reassured that these symptoms do not appear to represent a serious or life threatening condition at this time. Warning signs of worsening condition were discussed and understood by the patient. Based on patient's age, coexisting illness, exam, and the results of this ED evaluation, the decision to treat as an outpatient was made. Based on the information available at time of discharge, acute pathology requiring immediate intervention was deemed relative unlikely. While it is impossible to completely exclude the possibility of underlying serious disease or worsening of condition, I feel the relative likelihood is extremely low. I discussed this uncertainty with the patient, who understood ED evaluation and treatment and felt comfortable with the outpatient treatment plan. All questions regarding care, test results, and follow up were answered. The patient is stable and appropriate to discharge.  They understand that they should return to the emergency department for any new or worsening symptoms. I stressed the importance of follow up for repeat assessment and possibly further evaluation/treatment. Dictation disclaimer:  Please note that this dictation was completed with [employer], the computer voice recognition software. Quite often unanticipated grammatical, syntax, homophones, and other interpretive errors are inadvertently transcribed by the computer software. Please disregard these errors. Please excuse any errors that have escaped final proofreading. Coding Diagnoses     Clinical Impression:   1. Abscess of upper back excluding scapular region        Disposition     Disposition: Discharge. Corey Lagunas M.D. ADRIÁN Board Certified Emergency Physician    Provider Attestation:  If a scribe was utilized in generation of this patient record, I personally performed the services described in the documentation, reviewed the documentation, as recorded by the scribe in my presence, and it accurately records the patient's history of presenting illness, review of systems, patient physical examination, and procedures performed by me as the attending physician. KEVIN Syed Board Certified Emergency Physician  12/25/2020.  8:59 AM

## 2020-12-25 NOTE — ED NOTES
Pt seen evaluated and treated by provider. Dry sterile dressing placed on pt's back. Discharge instructions given with understanding verbalized.

## 2020-12-25 NOTE — DISCHARGE INSTRUCTIONS
SPECIFIC PATIENT INSTRUCTIONS FROM THE PHYSICIAN WHO TREATED YOU IN THE ER TODAY:  1. Return if any concerns or worsening of condition(s)  2. Keflex and Bactrim DS as prescribed until finished. MyChart Activation    Thank you for requesting access to [facility]. Please follow the instructions below to securely access and download your online medical record. [employer] allows you to send messages to your doctor, view your test results, renew your prescriptions, schedule appointments, and more. How Do I Sign Up? In your internet browser, go to [email or web address]. Click on the First Time User? Click Here link in the Sign In box. You will see the New Member Sign Up page. Enter your [employer] Access Code exactly as it appears below. You will not need to use this code after youve completed the sign-up process. If you do not sign up before the expiration date, you must request a new code. [facility] Access Code: [unfilled] (This is the date your [employer] access code will )    Enter the last four digits of your Social Security Number (xxxx) and Date of Birth (mm/dd/yyyy) as indicated and click Submit. You will be taken to the next sign-up page. Create a [facility] ID. This will be your [facility] login ID and cannot be changed, so think of one that is secure and easy to remember. Create a [employer] password. You can change your password at any time. Enter your Password Reset Question and Answer. This can be used at a later time if you forget your password. Enter your e-mail address. You will receive e-mail notification when new information is available in [address] Click Sign Up. You can now view and download portions of your medical record. Click the [place] link to download a portable copy of your medical information.     Additional Information    If you have questions, please visit the Frequently Asked Questions section of the [employer] website at [email or web address] Remember, [employer] is NOT to be used for urgent needs. For medical emergencies, dial 911.

## 2020-12-25 NOTE — ED TRIAGE NOTES
\"[facility] and they said to come in here for them to cut it off of me.  I'm on an antibiotic and my brother tried to poke it with a pin to relieve it but it didn't work

## 2023-10-27 NOTE — PROGRESS NOTES
Please see wound care instructions which have been provided separately.    ROOM #  2    Mario Luke presents today for   Chief Complaint   Patient presents with    Hypertension     4 month f/u    Diabetes     4 month f/u    Cholesterol Problem     4 month f/u    Medication Refill     Requested Prescriptions     Pending Prescriptions Disp Refills    oxyCODONE-acetaminophen (PERCOCET) 7.5-325 mg per tablet 30 Tab 0     Sig: Take one po daily prn pain         Mario Luke preferred language for health care discussion is english/other. Is someone accompanying this pt? no    Is the patient using any DME equipment during OV? no    Depression Screening:  PHQ over the last two weeks 2/22/2018 10/24/2017 7/24/2017 4/24/2017 9/22/2016 3/23/2016 3/23/2016   Little interest or pleasure in doing things Not at all Not at all Not at all Not at all Not at all Not at all Not at all   Feeling down, depressed or hopeless Not at all Not at all Not at all Not at all Not at all Not at all Not at all   Total Score PHQ 2 0 0 0 0 0 0 0       Learning Assessment:  Learning Assessment 2/14/2018 1/14/2015 12/20/2013   PRIMARY LEARNER [facility]   HIGHEST LEVEL OF EDUCATION - PRIMARY LEARNER  - DID NOT GRADUATE HIGH SCHOOL DID NOT GRADUATE [address]    NEED - - No   LEARNER PREFERENCE PRIMARY DEMONSTRATION [facility] - - no   ANSWERED BY self patient patient   RELATIONSHIP SELF SELF SELF       Abuse Screening:  Abuse Screening Questionnaire 10/24/2017   Do you ever feel afraid of your partner? N   Are you in a relationship with someone who physically or mentally threatens you? N   Is it safe for you to go home? Y       Fall Risk  No flowsheet data found. Health Maintenance reviewed and discussed per provider. Yes    Mario Luke is due for eye exam (pt. To schedule aptmt), microalbumin, foot exam, A1C .   Please order/place referral if appropriate. Advance Directive:  1. Do you have an advance directive in place? Patient Reply: no    2. If not, would you like material regarding how to put one in place? Patient Reply: no    Coordination of Care:  1. Have you been to the ER, urgent care clinic since your last visit? Hospitalized since your last visit? [facility]    2. Have you seen or consulted any other health care providers outside of the [facility] since your last visit? Include any pap smears or colon screening.  no

## (undated) DEVICE — FORCEPS BX L240CM JAW DIA2.8MM L CAP W/ NDL MIC MESH TOOTH

## (undated) DEVICE — KENDALL 500 SERIES DIAPHORETIC FOAM MONITORING ELECTRODE - TEAR DROP SHAPE ( 30/PK): Brand: KENDALL

## (undated) DEVICE — DEVICE INFL 60ML 12ATM CONVENIENT LOK REL HNDL HI PRSS FLX

## (undated) DEVICE — BASIN EMESIS 500CC ROSE 250/CS 60/PLT: Brand: MEDEGEN MEDICAL PRODUCTS, LLC

## (undated) DEVICE — FLEX ADVANTAGE 1500CC: Brand: FLEX ADVANTAGE

## (undated) DEVICE — KIT COLON W/ 1.1OZ LUB AND 2 END

## (undated) DEVICE — CONTAINER PREFIL FRMLN 15ML --

## (undated) DEVICE — SYR 50ML SLIP TIP NSAF LF STRL --

## (undated) DEVICE — MEDI-VAC NON-CONDUCTIVE SUCTION TUBING: Brand: CARDINAL HEALTH